# Patient Record
Sex: FEMALE | Race: WHITE | NOT HISPANIC OR LATINO | Employment: OTHER | URBAN - METROPOLITAN AREA
[De-identification: names, ages, dates, MRNs, and addresses within clinical notes are randomized per-mention and may not be internally consistent; named-entity substitution may affect disease eponyms.]

---

## 2019-12-16 ENCOUNTER — OFFICE VISIT (OUTPATIENT)
Dept: ORTHOPEDIC SURGERY | Facility: CLINIC | Age: 74
End: 2019-12-16

## 2019-12-16 VITALS — BODY MASS INDEX: 27.32 KG/M2 | HEIGHT: 66 IN | OXYGEN SATURATION: 95 % | WEIGHT: 170 LBS | HEART RATE: 67 BPM

## 2019-12-16 DIAGNOSIS — M17.12 PRIMARY OSTEOARTHRITIS OF LEFT KNEE: Primary | ICD-10-CM

## 2019-12-16 PROCEDURE — 20610 DRAIN/INJ JOINT/BURSA W/O US: CPT | Performed by: ORTHOPAEDIC SURGERY

## 2019-12-16 PROCEDURE — 99203 OFFICE O/P NEW LOW 30 MIN: CPT | Performed by: ORTHOPAEDIC SURGERY

## 2019-12-16 RX ORDER — IBUPROFEN 200 MG
200 TABLET ORAL EVERY 6 HOURS PRN
Status: ON HOLD | COMMUNITY
End: 2020-05-22 | Stop reason: SDUPTHER

## 2019-12-16 RX ORDER — FAMOTIDINE 20 MG/1
20 TABLET, FILM COATED ORAL 2 TIMES DAILY
COMMUNITY
End: 2020-03-18

## 2019-12-16 RX ORDER — ASPIRIN 81 MG/1
81 TABLET ORAL DAILY
COMMUNITY
End: 2020-02-17

## 2019-12-16 RX ORDER — TRIAMCINOLONE ACETONIDE 40 MG/ML
40 INJECTION, SUSPENSION INTRA-ARTICULAR; INTRAMUSCULAR
Status: COMPLETED | OUTPATIENT
Start: 2019-12-16 | End: 2019-12-16

## 2019-12-16 RX ORDER — ROPIVACAINE HYDROCHLORIDE 5 MG/ML
4 INJECTION, SOLUTION EPIDURAL; INFILTRATION; PERINEURAL
Status: COMPLETED | OUTPATIENT
Start: 2019-12-16 | End: 2019-12-16

## 2019-12-16 RX ORDER — CHOLECALCIFEROL (VITAMIN D3) 125 MCG
10 CAPSULE ORAL NIGHTLY
COMMUNITY
End: 2021-12-09

## 2019-12-16 RX ADMIN — TRIAMCINOLONE ACETONIDE 40 MG: 40 INJECTION, SUSPENSION INTRA-ARTICULAR; INTRAMUSCULAR at 16:31

## 2019-12-16 RX ADMIN — ROPIVACAINE HYDROCHLORIDE 4 ML: 5 INJECTION, SOLUTION EPIDURAL; INFILTRATION; PERINEURAL at 16:31

## 2019-12-16 NOTE — PROGRESS NOTES
[facility] [place] [facility] Note    Subjective     Chief Complaint   Patient presents with   • Left Knee - Pain        HPI    Asiya Cleaning is a 74 y.o. female.  She presents today for evaluation of left knee pain.  Pain is been present for several months, following no particular injury.  No previous injections.  Pain is 4 out of 10, worse with walking, climbing stairs and working.  Pain is dull, aching and burning.  Most is on the medial side of the knee.  She is using a crutch for ambulation.      There is no problem list on file for this patient.    Past Medical History:   Diagnosis Date   • Heart disease    • Osteoarthritis       Past Surgical History:   Procedure Laterality Date   • CATARACT EXTRACTION Left       Family History   Problem Relation Age of Onset   • Osteoarthritis Mother      Social History     Socioeconomic History   • Marital status:      Spouse name: Not on file   • Number of children: Not on file   • Years of education: Not on file   • Highest education level: Not on file   Tobacco Use   • Smoking status: Never Smoker   • Smokeless tobacco: Never Used   Substance and Sexual Activity   • Alcohol use: Never     Frequency: Never   • Drug use: Never      Current Outpatient Medications on File Prior to Visit   Medication Sig Dispense Refill   • aspirin 81 MG EC tablet Take 81 mg by mouth Daily.     • famotidine (PEPCID) 20 MG tablet Take 20 mg by mouth 2 (Two) Times a Day.     • ibuprofen (ADVIL,MOTRIN) 200 MG tablet Take 200 mg by mouth Every 6 (Six) Hours As Needed for Mild Pain .     • LUTEIN PO Take  by mouth.     • melatonin 5 MG tablet tablet Take 10 mg by mouth Every Night.       No current facility-administered medications on file prior to visit.       Allergies   Allergen Reactions   • Penicillins Itching        Review of Systems   Constitutional: Positive for activity change.   Cardiovascular: Positive for leg swelling.   Musculoskeletal: Positive for arthralgias, gait  "problem and joint swelling.        Objective      Physical Exam  Pulse 67   Ht 167.6 cm (66\")   Wt 77.1 kg (170 lb)   SpO2 95%   BMI 27.44 kg/m²     Body mass index is 27.44 kg/m².    General:   Mental Status:  Alert   Appearance: Cooperative, in no acute distress   Build and Nutrition: Well-nourished well-developed female   Orientation: Alert and oriented to person, place and time   Posture: Normal   Gait: Mild limp on the left    Integument:   Left knee: No skin lesions, no rash, no ecchymosis    Neurologic:   Sensation:    Left foot: Intact to light touch on the dorsal and plantar aspect   Motor:  Left lower extremity: 5/5 quadriceps, hamstrings, ankle dorsiflexors, and ankle plantar flexors  Vascular:   Left lower extremity: 2+ dorsalis pedis pulse, prompt capillary refill    Lower Extremities:   Left Knee:    Tenderness:  None    Effusion:  1-2+    Swelling:  None    Crepitus:  Positive    Atrophy:  None    Range of motion:  Extension: 0°       Flexion: 115°  Instability:  No varus laxity, no valgus laxity, negative anterior drawer  Deformities:  None      Imaging/Studies      Imaging Results (Last 24 Hours)     Procedure Component Value Units Date/Time    XR Knee 4+ View Left [[id number]] Resulted:  12/16/19 1602     Updated:  12/16/19 1603    Narrative:       Left Knee Radiographs  Indication: left knee pain  Views: Standing AP's and skiers of both knees, with lateral and sunrise   views of the left knee    Comparison: no prior studies available    Findings:   Bone-on-bone contact the medial compartment, tricompartmental osteophytes,   mild varus alignment, with bone-on-bone contact in the patellofemoral   joint, particularly along the lateral facet, with no acute bony   abnormalities.    Impression: Advanced left knee osteoarthritis.          Assessment and Plan     Asiya was seen today for pain.    Diagnoses and all orders for this visit:    Primary osteoarthritis of left knee  -     XR Knee 4+ View " Left  -     Large Joint Arthrocentesis and aspiration : L knee        1. Primary osteoarthritis of left knee        I reviewed my findings with patient today.  She does have left knee arthritis, and I offered her an aspiration and injection.  She wished to proceed.  I will see her back in 2 months, but sooner for any problems.    Of note, obtain 10 cc of clear straw-colored fluid, and then injected her knee, with 20% relief just a few minutes following the aspiration and injection.    Return in about 2 months (around 2/16/2020).      Medical Decision Making  Management Options : prescription/IM medicine  Data/Risk: radiology tests and independent visualization of imaging, lab tests, or EMG/NCV      Hadley Gibson MD  12/16/19  5:31 PM

## 2019-12-16 NOTE — PROGRESS NOTES
Procedure   Large Joint Arthrocentesis and aspiration : L knee  Date/Time: 12/16/2019 4:31 PM  Consent given by: patient  Site marked: site marked  Timeout: Immediately prior to procedure a time out was called to verify the correct patient, procedure, equipment, support staff and site/side marked as required   Supporting Documentation  Indications: pain   Procedure Details  Location: knee - L knee  Preparation: Patient was prepped and draped in the usual sterile fashion  Needle size: 22 G  Approach: anterolateral  Medications administered: 4 mL ropivacaine 0.5 %; 40 mg triamcinolone acetonide 40 MG/ML  Aspirate amount: 10 mL  Aspirate: clear and yellow  Patient tolerance: patient tolerated the procedure well with no immediate complications

## 2019-12-18 ENCOUNTER — TELEPHONE (OUTPATIENT)
Dept: ORTHOPEDIC SURGERY | Facility: CLINIC | Age: 74
End: 2019-12-18

## 2019-12-18 NOTE — TELEPHONE ENCOUNTER
PATIENT CAME IN FOR APPT ON Monday 12/16/19, MOISÉS DRAINED CYST ON L KNEE FOR HER BUT SHE IS HAVING A LOT OF PAIN ON THE SIDE OF THE KNEE CLOSE TO HER OTHER LEG. WASN'T SURE IF SHE NEEDED TO COME BACK IN. CALL BACK #[phone]

## 2019-12-27 ENCOUNTER — TELEPHONE (OUTPATIENT)
Dept: ORTHOPEDIC SURGERY | Facility: CLINIC | Age: 74
End: 2019-12-27

## 2019-12-27 NOTE — TELEPHONE ENCOUNTER
CALLED PT AND SCHD HER A TENTATIVE DOS OF 3/5/20. AND ADVISED HER TO KEEP THE 2/7/20 APPT W/DR CURIEL

## 2019-12-27 NOTE — TELEPHONE ENCOUNTER
----- Message from Jeff Patterson sent at 12/19/2019  1:55 PM EST -----  Regarding: SCHEDULE SURGERY  PATIENT CALLED AND WOULD LIKE TO PROCEED WITH SCHEDULING KNEE SURGERY  PLEASE CALL HER AT [phone]

## 2020-02-17 ENCOUNTER — OFFICE VISIT (OUTPATIENT)
Dept: ORTHOPEDIC SURGERY | Facility: CLINIC | Age: 75
End: 2020-02-17

## 2020-02-17 VITALS — WEIGHT: 165 LBS | BODY MASS INDEX: 26.52 KG/M2 | HEIGHT: 66 IN | OXYGEN SATURATION: 98 % | HEART RATE: 71 BPM

## 2020-02-17 DIAGNOSIS — M17.12 PRIMARY OSTEOARTHRITIS OF LEFT KNEE: Primary | ICD-10-CM

## 2020-02-17 PROCEDURE — 99213 OFFICE O/P EST LOW 20 MIN: CPT | Performed by: ORTHOPAEDIC SURGERY

## 2020-02-17 RX ORDER — PREGABALIN 150 MG/1
150 CAPSULE ORAL ONCE
Status: CANCELLED | OUTPATIENT
Start: 2020-02-17 | End: 2020-02-17

## 2020-02-17 RX ORDER — ACETAMINOPHEN 325 MG/1
1000 TABLET ORAL ONCE
Status: CANCELLED | OUTPATIENT
Start: 2020-02-17 | End: 2020-02-17

## 2020-02-17 RX ORDER — MELOXICAM 7.5 MG/1
15 TABLET ORAL ONCE
Status: CANCELLED | OUTPATIENT
Start: 2020-02-17 | End: 2020-02-17

## 2020-02-17 NOTE — PROGRESS NOTES
[facility] [place] [facility] Note    Subjective     Chief Complaint   Patient presents with   • Follow-up     2 month f/u; Primary osteoarthritis of left knee (aspriation and cortisone injection given last visit 12/16/19)        HPI    It has been 2  month(s) since Ms. Cleaning's last visit. She returns to clinic today for follow-up of left knee pain. She rates her pain a 3/10 on the pain scale, but can be severe and has been activity limiting, and she is reached the point where she would like to consider knee replacement surgery.. Previous/current treatments: bracing, cane/walker, NSAIDS and steroid injection (last injection 12/19/19). Current symptoms: same as prior visit. The pain is worse with walking, standing, sitting, climbing stairs, sleeping, working and leisure; resting and ice improve the pain. Overall, she is doing the same.  Pain has been ongoing since last July 2019, and she would like to proceed with knee replacement surgery.  No history of clots nor clotting disorders.    I have reviewed the following portions of the patient's history:History of Present Illness        Patient Active Problem List   Diagnosis   • Primary osteoarthritis of left knee     Past Medical History:   Diagnosis Date   • Heart disease    • Osteoarthritis       Past Surgical History:   Procedure Laterality Date   • CATARACT EXTRACTION Left       Family History   Problem Relation Age of Onset   • Osteoarthritis Mother      Social History     Socioeconomic History   • Marital status:      Spouse name: Not on file   • Number of children: Not on file   • Years of education: Not on file   • Highest education level: Not on file   Tobacco Use   • Smoking status: Never Smoker   • Smokeless tobacco: Never Used   Substance and Sexual Activity   • Alcohol use: Never     Frequency: Never   • Drug use: Never      Current Outpatient Medications on File Prior to Visit   Medication Sig Dispense Refill   • famotidine (PEPCID) 20 MG  tablet Take 20 mg by mouth 2 (Two) Times a Day.     • ibuprofen (ADVIL,MOTRIN) 200 MG tablet Take 200 mg by mouth Every 6 (Six) Hours As Needed for Mild Pain .     • LUTEIN PO Take  by mouth.     • melatonin 5 MG tablet tablet Take 10 mg by mouth Every Night.     • [DISCONTINUED] aspirin 81 MG EC tablet Take 81 mg by mouth Daily.       No current facility-administered medications on file prior to visit.       Allergies   Allergen Reactions   • Penicillins Itching        Review of Systems   Constitutional: Negative for activity change, appetite change, chills, diaphoresis, fatigue, fever and unexpected weight change.   HENT: Negative for congestion, dental problem, drooling, ear discharge, ear pain, facial swelling, hearing loss, mouth sores, nosebleeds, postnasal drip, rhinorrhea, sinus pressure, sneezing, sore throat, tinnitus, trouble swallowing and voice change.    Eyes: Negative for photophobia, pain, discharge, redness, itching and visual disturbance.   Respiratory: Negative for apnea, cough, choking, chest tightness, shortness of breath, wheezing and stridor.    Cardiovascular: Negative for chest pain, palpitations and leg swelling.   Gastrointestinal: Negative for abdominal distention, abdominal pain, anal bleeding, blood in stool, constipation, diarrhea, nausea, rectal pain and vomiting.   Endocrine: Negative for cold intolerance, heat intolerance, polydipsia, polyphagia and polyuria.   Genitourinary: Negative for decreased urine volume, difficulty urinating, dysuria, enuresis, flank pain, frequency, genital sores, hematuria and urgency.   Musculoskeletal: Positive for arthralgias. Negative for back pain, gait problem, joint swelling, myalgias, neck pain and neck stiffness.   Skin: Negative for color change, pallor, rash and wound.   Allergic/Immunologic: Negative for environmental allergies, food allergies and immunocompromised state.   Neurological: Negative for dizziness, tremors, seizures, syncope,  "facial asymmetry, speech difficulty, weakness, light-headedness, numbness and headaches.   Hematological: Negative for adenopathy. Does not bruise/bleed easily.   Psychiatric/Behavioral: Negative for agitation, behavioral problems, confusion, decreased concentration, dysphoric mood, hallucinations, self-injury, sleep disturbance and suicidal ideas. The patient is not nervous/anxious and is not hyperactive.         Objective      Physical Exam  Pulse 71   Ht 167.6 cm (65.98\")   Wt 74.8 kg (165 lb)   SpO2 98%   BMI 26.64 kg/m²     Body mass index is 26.64 kg/m².    General:   Mental Status:  Alert   Appearance: Cooperative, in no acute distress   Build and Nutrition: Well-nourished well-developed female   Orientation: Alert and oriented to person, place and time   Posture: Normal   Gait: Mild limp on the left    Integument:              Left knee: No skin lesions, no rash, no ecchymosis     Lower Extremities:              Left Knee:                          Tenderness:    None                          Effusion:          1+                          Swelling:          None                          Crepitus:          Positive                          Atrophy:           None                          Range of motion:        Extension:       0°                                                              Flexion:           115°  Instability:        No varus laxity, no valgus laxity, negative anterior drawer  Deformities:     None      Assessment and Plan     Asiya was seen today for follow-up.    Diagnoses and all orders for this visit:    Primary osteoarthritis of left knee  -     Case Request; Standing  -     Instructions on coughing, deep breathing, and incentive spirometry.; Future  -     CBC and Differential; Future  -     Basic metabolic panel; Future  -     Protime-INR; Future  -     APTT; Future  -     Sedimentation rate; Future  -     C-reactive protein; Future  -     Case Request    Other orders  -     " Follow Anesthesia Guidelines / Standing Orders; Future  -     Obtain informed consent  -     Provide instructions to patient regarding NPO status  -     Chlorhexidine Skin Prep - Educate and Review With Patient; Future  -     Provide Patient With ERAS Hydration Instructions  -     Provide Patient With Enhanced Recovery Booklet(s) or Handout  -     Provide Instructions/Handout For Benzoyl Peroxide 5% Wash If Having Shoulder/Arm Surgery (If Prescribed)  -     Provide Instructions/Handout For Bactroban And Chlorhexidine Shower (If Prescribed)  -     Perform A Memory Screening On All Hip/Knee Replacement Patients >Or Equal To 65 Years Or Older  -     Complete A PROMIS And HOOS Or KOOS Survey If Having Hip Or Knee Replacement  -     Provide Patient With Carbo Loading Instructions  -     Provide Patient With ERAS Booklet(s)/Handout  -     mupirocin (BACTROBAN) 2 % ointment; into the nostril(s) as directed by provider 2 (Two) Times a Day.  -     Chlorhexidine Gluconate 4 % solution; Apply  topically to the appropriate area as directed Daily. Shower with hibiclens solution as directed for 5 days prior to surgery        1. Primary osteoarthritis of left knee        I reviewed my findings with the patient today.  Her left knee pain continues to progress, and she would like to proceed with knee replacement surgery.  She is considering May timeframe of this year.  Risk, benefits, and alternatives to the procedure have been discussed.  Please see my counseling note for details.  Of note, she did not tolerate hydrocodone very well in the past, and tolerates oxycodone better.    Surgical Counseling     I have informed the patient of the diagnosis and the prognosis.  Exhaustive conservative treatment modalities have not resulted in long term pain relief.  The symptoms have progressed to the point of daily pain and inability to perform activities of daily living without significant pain. The patient has reached the point of desiring  to proceed with total knee arthroplasty after discussing the risks, benefits and alternatives to the procedure.  The surgical procedure itself was discussed in detail. Risks of the procedure were discussed, which included but are not limited to, bleeding, infection, damage to blood vessels and nerves, incomplete pain relief, loosening of the prosthesis, deep infection, need for further surgery, loss of limb, deep venous thrombosis, pulmonary embolus, death, heart attack, stroke, kidney failure, liver failure, and anesthetic complications.  In addition, the potential for deep infection developing in the future was discussed, which could require further surgery.  The knee would have to be re-opened, debrided, and potentially remove the prosthesis, which may or may not be replaced in the future.  Also, the possibility for loosening of the prosthesis has been mentioned.  If the prosthesis loosened, a revision arthroplasty could be performed, with results that are not as predictable compared to the original procedure.  The typical rehabilitative course has also been discussed, and full recovery may take up to a year to see the maximum benefit.  The importance of patient cooperation in the rehabilitative efforts has also been discussed.  No guarantees whatsoever were given.  The patient understands the potential risks versus the benefits and desires to proceed with total knee arthroplasty at a mutually convenient time.    Return for For surgery as planned.      Medical Decision Making  Management Options : major surgery with risk factors      Hadley Gibson MD  02/17/20  6:11 PM

## 2020-03-18 ENCOUNTER — OFFICE VISIT (OUTPATIENT)
Dept: PULMONOLOGY | Facility: CLINIC | Age: 75
End: 2020-03-18

## 2020-03-18 VITALS
WEIGHT: 170.4 LBS | HEIGHT: 66 IN | HEART RATE: 84 BPM | OXYGEN SATURATION: 98 % | SYSTOLIC BLOOD PRESSURE: 124 MMHG | DIASTOLIC BLOOD PRESSURE: 70 MMHG | BODY MASS INDEX: 27.38 KG/M2 | TEMPERATURE: 97.8 F

## 2020-03-18 DIAGNOSIS — R05.9 COUGH: Primary | ICD-10-CM

## 2020-03-18 DIAGNOSIS — J30.1 SEASONAL ALLERGIC RHINITIS DUE TO POLLEN: ICD-10-CM

## 2020-03-18 PROCEDURE — 99203 OFFICE O/P NEW LOW 30 MIN: CPT | Performed by: INTERNAL MEDICINE

## 2020-03-18 NOTE — PROGRESS NOTES
Asiya Cleaning is a 74 y.o. female here for evaluation of   Chief Complaint   Patient presents with   • Shortness of Breath   • Breathing Problem   • Cough   • Wheezing       Problem list:  1. Persistent cough  2. Seasonal allergies  3. Influenza a, January 18, 2020  4. Osteoarthritis left knee  5. Baker's cyst left knee  6. Left cataract extraction, 2010  7. Repair of macular hole, 2009  8. Colonoscopy, 2008, normal  9. Dental extraction  10. Childbirth x2  11. Allergies, doxycycline-rash, penicillin-itching  12. No tobacco, no alcohol      History of Present Illness    74-year-old woman, non-smoker, doing well until January 16 when she developed influenza A.  She did not take Tamiflu.  She transiently improved but on January 27 she presented with persistent cough that changed to purulent secretions.  She did not have fever.  She was given azithromycin and felt unimproved.  She continued to remain afebrile but had a relentless cough.  Cough is now productive of mucoid secretions, mostly clear.  She was evaluated at the [facility] February 10.  Chest x-ray was reportedly abnormal with bibasilar atelectasis.  She received 5 days of Levaquin without benefit.  Cough would come in paroxysms.  She would then cough up a scant mucous plug and feel improved.  Denies pleurisy or hemoptysis.  Denies reflux or dysphasia.  She has a lot of allergies, and was receiving immunotherapy.  That has been on hold with this acute infection.  She then had an ear infection.  She did receive doxycycline, stay hist, Z test.  This did dry her sinuses up.  However she had a rash to doxycycline after the second dose and it was discontinued.  She does not recall the last Tetanus diphtheria pertussis vaccine.  She denies a history of asthma, wheezing, dyspnea.  She denies exposure to mold or noxious fumes.  She started using a vaporizer at night and that has helped her nasal dryness.  She has never been exposed to TB and has never had a  positive skin test.  She has not traveled outside of [place].  She denies nasal polyps or aspirin sensitivity.  Approximately 4 days ago she started feeling better.  The cough is not gone but it is less frequent.  She did have a follow-up chest x-ray March 11 that revealed mild persistent bibasilar airspace disease possibly atelectasis versus pneumonia.      Review of Systems   Constitutional: Positive for fatigue. Negative for chills, fever and unexpected weight change.   HENT: Positive for congestion, postnasal drip and sinus pressure. Negative for sore throat and trouble swallowing.    Eyes: Negative for visual disturbance.   Respiratory: Positive for cough. Negative for apnea, choking, chest tightness, shortness of breath and wheezing.    Cardiovascular: Negative for chest pain, palpitations and leg swelling.   Gastrointestinal: Negative for abdominal pain, blood in stool and diarrhea.   Endocrine: Negative.    Genitourinary: Negative for dysuria, frequency and vaginal bleeding.   Musculoskeletal: Positive for arthralgias. Negative for back pain and neck pain.   Skin: Negative for rash.   Allergic/Immunologic: Positive for environmental allergies.   Neurological: Negative for tremors, weakness, light-headedness and headaches.   Hematological: Negative for adenopathy. Does not bruise/bleed easily.   Psychiatric/Behavioral: Negative.          Current Outpatient Medications:   •  Chlorhexidine Gluconate 4 % solution, Shower with solution as directed for 5 days prior to surgery, Disp: 237 mL, Rfl: 0  •  Chlorpheniramine-Codeine (Z-Tuss AC) 2-9 MG/5ML liquid, Take  by mouth., Disp: , Rfl:   •  Dexchlorpheniramine-Phenyleph (STAHIST PO), Take  by mouth., Disp: , Rfl:   •  ibuprofen (ADVIL,MOTRIN) 200 MG tablet, Take 200 mg by mouth Every 6 (Six) Hours As Needed for Mild Pain ., Disp: , Rfl:   •  LUTEIN PO, Take  by mouth., Disp: , Rfl:   •  melatonin 5 MG tablet tablet, Take 10 mg by mouth Every Night., Disp: , Rfl:  "  •  mupirocin (BACTROBAN) 2 % ointment, Apply into the nostril(s) as directed by provider 2 (Two) Times a Day., Disp: 22 g, Rfl: 0    Past Medical History:   Diagnosis Date   • Allergic rhinitis    • Baker's cyst of knee, left    • Osteoarthritis      Past Surgical History:   Procedure Laterality Date   • CATARACT EXTRACTION Left 2010   • COLONOSCOPY  2008    clear   • DENTAL PROCEDURE      extraction   • VITRECTOMY PARS PLANA W/ REPAIR OF MACULAR HOLE  2009     Social History     Socioeconomic History   • Marital status:      Spouse name: Not on file   • Number of children: 2   • Years of education: Not on file   • Highest education level: Not on file   Occupational History   • Occupation: take out resturant owner   Tobacco Use   • Smoking status: Never Smoker   • Smokeless tobacco: Never Used   Substance and Sexual Activity   • Alcohol use: Never     Frequency: Never   • Drug use: Never     Family History   Problem Relation Age of Onset   • Osteoarthritis Mother    • Pneumonia Mother    • Dementia Father      Blood pressure 124/70, pulse 84, temperature 97.8 °F (36.6 °C), height 167.6 cm (66\"), weight 77.3 kg (170 lb 6.4 oz), SpO2 98 %, not currently breastfeeding.    Physical Exam   Constitutional: She is oriented to person, place, and time. She appears well-developed and well-nourished. No distress.   HENT:   Head: Normocephalic and atraumatic.   Clear PND. No exudate   Eyes: Pupils are equal, round, and reactive to light. EOM are normal. No scleral icterus.   Neck: No JVD present. No tracheal deviation present. No thyromegaly present.   Cardiovascular: Normal rate, regular rhythm and normal heart sounds.   No murmur heard.  Pulmonary/Chest: Effort normal and breath sounds normal. She has no wheezes. She has no rales.   Abdominal: Soft. Bowel sounds are normal. She exhibits no distension. There is no tenderness.   Musculoskeletal: She exhibits no edema.   Lymphadenopathy:     She has no cervical " adenopathy.   Neurological: She is alert and oriented to person, place, and time.   Skin: Skin is warm and dry.   Psychiatric: She has a normal mood and affect.         PFTs:  Unable to perform secondary to CoVID 19    Radiology:  I reviewed her chest x-rays.  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  She has some calcified granulomatous changes and some postinflammatory scarring in the lower lobes      Lab:  White count 5.9, hemoglobin 15.6, hematocrit 43.1, platelet count 195, 44% polys, 37% lymphocytes, 12% monocytes, 5% eosinophils    Asiya was seen today for shortness of breath, breathing problem, cough and wheezing.    Diagnoses and all orders for this visit:    Cough    Seasonal allergic rhinitis due to pollen        Discussion:     Delightful 74-year-old woman, non-smoker here for persistent cough after an influenza A infection.  She has remained afebrile despite this cough.  That makes a pneumococcal or staph post influenza pneumonia less likely.  Radiographically she has some postinflammatory scarring in the lower lobes but I would not say she has a lobar process or infiltrate.  She does have calcified granulomas with some fibronodular changes as well.  I do not think that she is contagious.  She now has significant postnasal drip despite stay hist.  I do not see any nasal polyps.    Restart immunotherapy  Start Flonase twice daily  Continue an antihistamine either stay hist or Zyrtec but not both together  Nasal irrigation at night  Follow-up as needed      Steff Matthews MD

## 2020-03-31 ENCOUNTER — TELEPHONE (OUTPATIENT)
Dept: PULMONOLOGY | Facility: CLINIC | Age: 75
End: 2020-03-31

## 2020-03-31 NOTE — PROGRESS NOTES
Ms. Cleaning called stating that she was having worsening congestion, postnasal drainage and coughing.  She states that she saw Dr. Matthews 2 weeks ago and is tried Zyrtec since that time.  She continues to have postnasal drainage regardless of the medication she is taking.  She had stopped her Flonase and I did recommend her starting her Flonase back.  She also had her allergy shot 2 weeks ago but has not got her injection this week.  I did advise her to call her allergist and see if maybe she could get into see her allergist for any recommendations for her drainage.  She was agreeable with this plan and will call if she needs any additional assistance.

## 2020-03-31 NOTE — TELEPHONE ENCOUNTER
Pt called today requesting to schedule an apt due to no improvement since seen in the office on 3/18. Pt c/o persistent cough/congestion/sinus pressure and feels that the pneumonia has not cleared. Pt denies fever/chest pain/nausea. Pt can be reached @ [phone]. Please advise.

## 2020-04-20 ENCOUNTER — TELEPHONE (OUTPATIENT)
Dept: ORTHOPEDIC SURGERY | Facility: CLINIC | Age: 75
End: 2020-04-20

## 2020-04-20 NOTE — TELEPHONE ENCOUNTER
PATIENT CALLED STATED SHE WAS SUPPOSED TO HAVE SURGERY ON  RIGHT KNEE. PATIENT STATED THAT NOW HER LEFT KNEE IS HURTING JUST AS BAD AND PATIENT STATED POSSIBLY A BAKER CYST. PATIENT STATED SWELLING TO RIGHT KNEE.PATIENT STATED HAD KNEE  DRAINED IN DECEMBER BUT ITS STARTING TO STIFFEN UP. PATIENT WOULD LIKE A CALL BACK [phone].

## 2020-04-20 NOTE — TELEPHONE ENCOUNTER
The patient mentioned that the right knee has really gone downhill. She has had a lot of stiffness and swelling in the knee. She thinks it has to do with a bakers cyst and it is getting difficult to get up and down stairs. I asked if she has been doing any ice, heat, Tylenol or Ibuprofen. She mentioned that she has only been using ice and a CBD cream on the knee. I asked if she was able to do any oral anti-inflammatories and she mentioned that she has but she does not see any pain relief. She has been Tylenol and that seems to help some. I advised that she should keep using either ice or heat to see which one works best for her. I also let her know that she can elevate the knee when she is having the swelling and use some type of compression on the knee to give it some relief. She understood.    Is there anything else you may recommend at this time?     Kassidy

## 2020-04-21 NOTE — TELEPHONE ENCOUNTER
I spoke with the patient and advised that she should continue to do everything we discussed yesterday for the time being and if she is to have any other concerns to return our call. She understood.    Kassidy

## 2020-04-21 NOTE — TELEPHONE ENCOUNTER
That is good advice.  I do not have anything else to recommend currently.  We will be rescheduling her surgery when we are able to start doing outpatient surgery (if she is a candidate), but we are not sure at this time on when that will be.  We are awaiting further guidance from federal and state government.

## 2020-04-23 ENCOUNTER — APPOINTMENT (OUTPATIENT)
Dept: PREADMISSION TESTING | Facility: HOSPITAL | Age: 75
End: 2020-04-23

## 2020-04-28 ENCOUNTER — TELEPHONE (OUTPATIENT)
Dept: ORTHOPEDIC SURGERY | Facility: CLINIC | Age: 75
End: 2020-04-28

## 2020-05-05 ENCOUNTER — TELEPHONE (OUTPATIENT)
Dept: PULMONOLOGY | Facility: CLINIC | Age: 75
End: 2020-05-05

## 2020-05-05 NOTE — TELEPHONE ENCOUNTER
Pt called today c/o cough w/ sputum/congestion since last office visit with Dr Matthews and doesn't seem to be getting any better. Pt denies fever/chest pain/nausea.  Pt uses [facility] and can be reached @ [phone]. Please advise.

## 2020-05-07 NOTE — TELEPHONE ENCOUNTER
Pt scheduled with Dr Matthews for 5/11 @ 10:00. Pt will also be needing a pre-op clearance for left knee surgery on 5/21. Testing will be determined with Dr Matthews. Pt notified through  of apt time and date. Office # given.

## 2020-05-11 ENCOUNTER — OFFICE VISIT (OUTPATIENT)
Dept: PULMONOLOGY | Facility: CLINIC | Age: 75
End: 2020-05-11

## 2020-05-11 VITALS
HEART RATE: 80 BPM | OXYGEN SATURATION: 96 % | SYSTOLIC BLOOD PRESSURE: 110 MMHG | TEMPERATURE: 97.1 F | WEIGHT: 176.8 LBS | HEIGHT: 66 IN | DIASTOLIC BLOOD PRESSURE: 70 MMHG | BODY MASS INDEX: 28.42 KG/M2

## 2020-05-11 DIAGNOSIS — R05.9 COUGH: ICD-10-CM

## 2020-05-11 DIAGNOSIS — J30.1 SEASONAL ALLERGIC RHINITIS DUE TO POLLEN: Primary | ICD-10-CM

## 2020-05-11 PROCEDURE — 94729 DIFFUSING CAPACITY: CPT | Performed by: INTERNAL MEDICINE

## 2020-05-11 PROCEDURE — 99213 OFFICE O/P EST LOW 20 MIN: CPT | Performed by: INTERNAL MEDICINE

## 2020-05-11 PROCEDURE — 94726 PLETHYSMOGRAPHY LUNG VOLUMES: CPT | Performed by: INTERNAL MEDICINE

## 2020-05-11 PROCEDURE — 94060 EVALUATION OF WHEEZING: CPT | Performed by: INTERNAL MEDICINE

## 2020-05-11 RX ORDER — MONTELUKAST SODIUM 10 MG/1
10 TABLET ORAL NIGHTLY
Qty: 30 TABLET | Refills: 11 | Status: SHIPPED | OUTPATIENT
Start: 2020-05-11 | End: 2021-05-24

## 2020-05-11 RX ORDER — ALBUTEROL SULFATE 90 UG/1
4 AEROSOL, METERED RESPIRATORY (INHALATION) ONCE
Status: COMPLETED | OUTPATIENT
Start: 2020-05-11 | End: 2020-05-11

## 2020-05-11 RX ADMIN — ALBUTEROL SULFATE 4 PUFF: 90 AEROSOL, METERED RESPIRATORY (INHALATION) at 11:11

## 2020-05-11 NOTE — PROGRESS NOTES
Asiya Cleaning is a 74 y.o. female here for evaluation of   Chief Complaint   Patient presents with   • Cough       Problem list:  1. Persistent cough  2. Seasonal allergies  3. Influenza a, January 18, 2020  4. Osteoarthritis left knee  5. Baker's cyst left knee  6. Left cataract extraction, 2010  7. Repair of macular hole, 2009  8. Colonoscopy, 2008, normal  9. Dental extraction  10. Childbirth x2  11. Allergies, doxycycline-rash, penicillin-itching  12. No tobacco, no alcohol    History of Present Illness     74-year-old woman, non-smoker initially evaluated by me for persistent cough after an influenza infection.  She had no wheezing on examination.  I cannot perform PFTs because of coronavirus.  She had significant postnasal drip.  She is taking Zyrtec in the morning, Benadryl at night, using her nasal steroids and continues to have copious drainage.  She did restart her immunotherapy and has had weekly shots for the last 4 weeks.  At least she is not coughing at night but has persistent cough throughout the day.  She denies wheezing and has never had a history of asthma.  She denies noxious fume exposure.  She denies reflux symptoms.      Review of Systems   Constitutional: Negative.    HENT: Positive for congestion and postnasal drip.    Respiratory: Positive for cough. Negative for wheezing.    Cardiovascular: Negative for chest pain.         Current Outpatient Medications:   •  Chlorhexidine Gluconate 4 % solution, Shower with solution as directed for 5 days prior to surgery, Disp: 237 mL, Rfl: 0  •  Chlorpheniramine-Codeine (Z-Tuss AC) 2-9 MG/5ML liquid, Take  by mouth., Disp: , Rfl:   •  Dexchlorpheniramine-Phenyleph (STAHIST PO), Take  by mouth., Disp: , Rfl:   •  ibuprofen (ADVIL,MOTRIN) 200 MG tablet, Take 200 mg by mouth Every 6 (Six) Hours As Needed for Mild Pain ., Disp: , Rfl:   •  LUTEIN PO, Take  by mouth., Disp: , Rfl:   •  melatonin 5 MG tablet tablet, Take 10 mg by mouth Every Night., Disp:  ", Rfl:   •  mupirocin (BACTROBAN) 2 % ointment, Apply into the nostril(s) as directed by provider 2 (Two) Times a Day., Disp: 22 g, Rfl: 0  •  montelukast (SINGULAIR) 10 MG tablet, Take 1 tablet by mouth Every Night., Disp: 30 tablet, Rfl: 11    Past Medical History:   Diagnosis Date   • Allergic rhinitis    • Baker's cyst of knee, left    • Osteoarthritis      Past Surgical History:   Procedure Laterality Date   • CATARACT EXTRACTION Left 2010   • COLONOSCOPY  2008    clear   • DENTAL PROCEDURE      extraction   • VITRECTOMY PARS PLANA W/ REPAIR OF MACULAR HOLE  2009     Social History     Socioeconomic History   • Marital status:      Spouse name: Not on file   • Number of children: 2   • Years of education: Not on file   • Highest education level: Not on file   Occupational History   • Occupation: take out resturant owner   Tobacco Use   • Smoking status: Never Smoker   • Smokeless tobacco: Never Used   Substance and Sexual Activity   • Alcohol use: Never     Frequency: Never   • Drug use: Never     Family History   Problem Relation Age of Onset   • Osteoarthritis Mother    • Pneumonia Mother    • Dementia Father      Blood pressure 110/70, pulse 80, temperature 97.1 °F (36.2 °C), height 167.6 cm (66\"), weight 80.2 kg (176 lb 12.8 oz), SpO2 96 %,    Physical Exam   Constitutional: She is oriented to person, place, and time. She appears well-developed and well-nourished. No distress.   HENT:   Head: Normocephalic and atraumatic.   Nasal erythema, no polyps   Eyes: Pupils are equal, round, and reactive to light. EOM are normal. No scleral icterus.   Neck: No JVD present. No tracheal deviation present. No thyromegaly present.   No bruits   Cardiovascular: Normal rate, regular rhythm and normal heart sounds.   No murmur heard.  Pulmonary/Chest: Effort normal and breath sounds normal. She has no wheezes. She has no rales.   Abdominal: Soft. Bowel sounds are normal. She exhibits no distension. There is no " tenderness.   Musculoskeletal: She exhibits no edema.   Lymphadenopathy:     She has no cervical adenopathy.   Neurological: She is alert and oriented to person, place, and time.   Skin: Skin is dry.   Psychiatric: She has a normal mood and affect.         PFTs:    Radiology:  CXR from 3/11/2020 scoliosis. NAD, Azogous lobe  Lab:    Asiya was seen today for cough.    Diagnoses and all orders for this visit:    Seasonal allergic rhinitis due to pollen    Cough    Other orders  -     montelukast (SINGULAIR) 10 MG tablet; Take 1 tablet by mouth Every Night.        Discussion:     74-year-old woman with a persistent cough and evidence of postnasal drip.  Cough is persisted despite fairly aggressive treatment of her allergies with immunotherapy, antihistamines, nasal steroids.  She has never tried Singulair.  She cannot take decongestants.  She does not have wheezing on examination.  Despite her cough she has a normal resting oxygen and no sign of pneumonia or wheezing on examination.  She has no history of cardiac disease and no risk factors.  Blood pressure is normal at 110/70.  She should tolerate general anesthesia for a knee replacement next week.    Spirometry with and without bronchodilators  Add Singulair 10 mg at bedtime      Steff Matthews MD    Addendum: Spirometry reveals FVC 3.70 L, 116%, FEV1 2.93 L, 122%, ratio 79%, total lung capacity 5.32 L, 102%, diffusion capacity 20.5, 94%, normal study.  She is clear for general anesthesia and total knee replacement.

## 2020-05-17 ENCOUNTER — APPOINTMENT (OUTPATIENT)
Dept: PREADMISSION TESTING | Facility: HOSPITAL | Age: 75
End: 2020-05-17

## 2020-05-18 ENCOUNTER — APPOINTMENT (OUTPATIENT)
Dept: PREADMISSION TESTING | Facility: HOSPITAL | Age: 75
End: 2020-05-18

## 2020-05-18 ENCOUNTER — OFFICE VISIT (OUTPATIENT)
Dept: PREADMISSION TESTING | Facility: HOSPITAL | Age: 75
End: 2020-05-18

## 2020-05-18 VITALS — HEIGHT: 66 IN | BODY MASS INDEX: 28.34 KG/M2 | WEIGHT: 176.37 LBS | TEMPERATURE: 97.6 F

## 2020-05-18 DIAGNOSIS — M17.12 PRIMARY OSTEOARTHRITIS OF LEFT KNEE: ICD-10-CM

## 2020-05-18 LAB
ANION GAP SERPL CALCULATED.3IONS-SCNC: 10 MMOL/L (ref 5–15)
APTT PPP: 34.1 SECONDS (ref 24–37)
BASOPHILS # BLD AUTO: 0.01 10*3/MM3 (ref 0–0.2)
BASOPHILS NFR BLD AUTO: 0.2 % (ref 0–1.5)
BUN BLD-MCNC: 8 MG/DL (ref 8–23)
BUN/CREAT SERPL: 10.7 (ref 7–25)
CALCIUM SPEC-SCNC: 9.4 MG/DL (ref 8.6–10.5)
CHLORIDE SERPL-SCNC: 106 MMOL/L (ref 98–107)
CO2 SERPL-SCNC: 28 MMOL/L (ref 22–29)
CREAT BLD-MCNC: 0.75 MG/DL (ref 0.57–1)
CRP SERPL-MCNC: 0.15 MG/DL (ref 0–0.5)
DEPRECATED RDW RBC AUTO: 43.8 FL (ref 37–54)
EOSINOPHIL # BLD AUTO: 0.08 10*3/MM3 (ref 0–0.4)
EOSINOPHIL NFR BLD AUTO: 1.9 % (ref 0.3–6.2)
ERYTHROCYTE [DISTWIDTH] IN BLOOD BY AUTOMATED COUNT: 12.6 % (ref 12.3–15.4)
ERYTHROCYTE [SEDIMENTATION RATE] IN BLOOD: 6 MM/HR (ref 0–30)
GFR SERPL CREATININE-BSD FRML MDRD: 76 ML/MIN/1.73
GLUCOSE BLD-MCNC: 120 MG/DL (ref 65–99)
HCT VFR BLD AUTO: 41.2 % (ref 34–46.6)
HGB BLD-MCNC: 13.6 G/DL (ref 12–15.9)
IMM GRANULOCYTES # BLD AUTO: 0.02 10*3/MM3 (ref 0–0.05)
IMM GRANULOCYTES NFR BLD AUTO: 0.5 % (ref 0–0.5)
INR PPP: 1.05 (ref 0.85–1.16)
LYMPHOCYTES # BLD AUTO: 1.36 10*3/MM3 (ref 0.7–3.1)
LYMPHOCYTES NFR BLD AUTO: 31.5 % (ref 19.6–45.3)
MCH RBC QN AUTO: 31.2 PG (ref 26.6–33)
MCHC RBC AUTO-ENTMCNC: 33 G/DL (ref 31.5–35.7)
MCV RBC AUTO: 94.5 FL (ref 79–97)
MONOCYTES # BLD AUTO: 0.61 10*3/MM3 (ref 0.1–0.9)
MONOCYTES NFR BLD AUTO: 14.1 % (ref 5–12)
NEUTROPHILS # BLD AUTO: 2.24 10*3/MM3 (ref 1.7–7)
NEUTROPHILS NFR BLD AUTO: 51.8 % (ref 42.7–76)
NRBC BLD AUTO-RTO: 0 /100 WBC (ref 0–0.2)
PLATELET # BLD AUTO: 194 10*3/MM3 (ref 140–450)
PMV BLD AUTO: 10.4 FL (ref 6–12)
POTASSIUM BLD-SCNC: 3.8 MMOL/L (ref 3.5–5.2)
PROTHROMBIN TIME: 13.4 SECONDS (ref 11.5–14)
RBC # BLD AUTO: 4.36 10*6/MM3 (ref 3.77–5.28)
SODIUM BLD-SCNC: 144 MMOL/L (ref 136–145)
WBC NRBC COR # BLD: 4.32 10*3/MM3 (ref 3.4–10.8)

## 2020-05-18 PROCEDURE — U0004 COV-19 TEST NON-CDC HGH THRU: HCPCS | Performed by: INTERNAL MEDICINE

## 2020-05-18 PROCEDURE — 80048 BASIC METABOLIC PNL TOTAL CA: CPT | Performed by: ORTHOPAEDIC SURGERY

## 2020-05-18 PROCEDURE — 85730 THROMBOPLASTIN TIME PARTIAL: CPT | Performed by: ORTHOPAEDIC SURGERY

## 2020-05-18 PROCEDURE — 86140 C-REACTIVE PROTEIN: CPT | Performed by: ORTHOPAEDIC SURGERY

## 2020-05-18 PROCEDURE — C9803 HOPD COVID-19 SPEC COLLECT: HCPCS

## 2020-05-18 PROCEDURE — 36415 COLL VENOUS BLD VENIPUNCTURE: CPT

## 2020-05-18 PROCEDURE — 85652 RBC SED RATE AUTOMATED: CPT | Performed by: ORTHOPAEDIC SURGERY

## 2020-05-18 PROCEDURE — U0002 COVID-19 LAB TEST NON-CDC: HCPCS | Performed by: INTERNAL MEDICINE

## 2020-05-18 PROCEDURE — 85610 PROTHROMBIN TIME: CPT | Performed by: ORTHOPAEDIC SURGERY

## 2020-05-18 PROCEDURE — 85025 COMPLETE CBC W/AUTO DIFF WBC: CPT | Performed by: ORTHOPAEDIC SURGERY

## 2020-05-18 RX ORDER — ASCORBIC ACID 500 MG
1000 TABLET ORAL DAILY
COMMUNITY

## 2020-05-18 RX ORDER — ACETAMINOPHEN 325 MG/1
650 TABLET ORAL EVERY 6 HOURS PRN
COMMUNITY

## 2020-05-18 RX ORDER — CETIRIZINE HYDROCHLORIDE 10 MG/1
10 TABLET ORAL DAILY
COMMUNITY
End: 2021-05-24

## 2020-05-18 RX ORDER — DIPHENHYDRAMINE HCL 25 MG
25 CAPSULE ORAL NIGHTLY PRN
COMMUNITY
End: 2021-05-24

## 2020-05-18 RX ORDER — FLUTICASONE PROPIONATE 50 MCG
2 SPRAY, SUSPENSION (ML) NASAL DAILY
COMMUNITY

## 2020-05-18 ASSESSMENT — KOOS JR
KOOS JR SCORE: 14
KOOS JR SCORE: 52.465

## 2020-05-18 NOTE — PAT
MEMORY SCREEN PASSED    Patient to apply Chlorhexadine wipes  to surgical area (as instructed) the night before procedure and the AM of procedure. Wipes provided.    Patient instructed to drink 20 ounces (or until full) of Gatorade and it needs to be completed 1 hour before given arrival time for procedure (NO RED Gatorade)    Patient verbalized understanding.    COVID TEST PERFORMED PRIOR TO PAT APPT.    JOINT BINDER GIVEN TO PATIENT     Verified with patient that they received a prescription for Bactroban and Chlorhexidine shower from the office.  Reinforced with them to fill the prescription if they haven't already.  Verbal and written instructions given regarding proper use of the Bactroban and Chlorhexidine to patient and/or famlily during PAT visit. Patient/family also instructed to complete checklist and return it to Pre-op on the day of surgery.  Patient and/or family verbalized understanding.

## 2020-05-18 NOTE — DISCHARGE INSTRUCTIONS

## 2020-05-19 LAB
REF LAB TEST METHOD: NORMAL
SARS-COV-2 RNA RESP QL NAA+PROBE: NOT DETECTED

## 2020-05-21 ENCOUNTER — ANESTHESIA EVENT (OUTPATIENT)
Dept: PERIOP | Facility: HOSPITAL | Age: 75
End: 2020-05-21

## 2020-05-21 ENCOUNTER — HOSPITAL ENCOUNTER (OUTPATIENT)
Facility: HOSPITAL | Age: 75
Discharge: HOME | End: 2020-05-22
Attending: ORTHOPAEDIC SURGERY | Admitting: ORTHOPAEDIC SURGERY

## 2020-05-21 ENCOUNTER — APPOINTMENT (OUTPATIENT)
Dept: GENERAL RADIOLOGY | Facility: HOSPITAL | Age: 75
End: 2020-05-21

## 2020-05-21 ENCOUNTER — ANESTHESIA (OUTPATIENT)
Dept: PERIOP | Facility: HOSPITAL | Age: 75
End: 2020-05-21

## 2020-05-21 DIAGNOSIS — M17.12 PRIMARY OSTEOARTHRITIS OF LEFT KNEE: ICD-10-CM

## 2020-05-21 DIAGNOSIS — Z96.652 S/P TOTAL KNEE ARTHROPLASTY, LEFT: Primary | ICD-10-CM

## 2020-05-21 PROBLEM — F32.A DEPRESSION: Status: ACTIVE | Noted: 2020-05-21

## 2020-05-21 PROCEDURE — 63710000001 OXYCODONE 5 MG TABLET: Performed by: ORTHOPAEDIC SURGERY

## 2020-05-21 PROCEDURE — 25010000002 ONDANSETRON PER 1 MG: Performed by: NURSE ANESTHETIST, CERTIFIED REGISTERED

## 2020-05-21 PROCEDURE — A9270 NON-COVERED ITEM OR SERVICE: HCPCS | Performed by: ORTHOPAEDIC SURGERY

## 2020-05-21 PROCEDURE — 25010000003 CEFAZOLIN IN DEXTROSE 2-4 GM/100ML-% SOLUTION: Performed by: ORTHOPAEDIC SURGERY

## 2020-05-21 PROCEDURE — 63710000001 MELATONIN 5 MG TABLET: Performed by: INTERNAL MEDICINE

## 2020-05-21 PROCEDURE — 73560 X-RAY EXAM OF KNEE 1 OR 2: CPT

## 2020-05-21 PROCEDURE — C1776 JOINT DEVICE (IMPLANTABLE): HCPCS | Performed by: ORTHOPAEDIC SURGERY

## 2020-05-21 PROCEDURE — A9270 NON-COVERED ITEM OR SERVICE: HCPCS | Performed by: INTERNAL MEDICINE

## 2020-05-21 PROCEDURE — 63710000001 MONTELUKAST 10 MG TABLET: Performed by: INTERNAL MEDICINE

## 2020-05-21 PROCEDURE — 25010000002 DEXAMETHASONE PER 1 MG: Performed by: NURSE ANESTHETIST, CERTIFIED REGISTERED

## 2020-05-21 PROCEDURE — 63710000001 ACETAMINOPHEN 325 MG TABLET: Performed by: ORTHOPAEDIC SURGERY

## 2020-05-21 PROCEDURE — 25010000002 HYDROMORPHONE PER 4 MG: Performed by: ORTHOPAEDIC SURGERY

## 2020-05-21 PROCEDURE — 97161 PT EVAL LOW COMPLEX 20 MIN: CPT

## 2020-05-21 PROCEDURE — C1713 ANCHOR/SCREW BN/BN,TIS/BN: HCPCS | Performed by: ORTHOPAEDIC SURGERY

## 2020-05-21 PROCEDURE — 25010000002 ROPIVACAINE PER 1 MG: Performed by: ORTHOPAEDIC SURGERY

## 2020-05-21 PROCEDURE — 25010000002 ROPIVACAINE PER 1 MG: Performed by: NURSE ANESTHETIST, CERTIFIED REGISTERED

## 2020-05-21 PROCEDURE — 63710000001 CETIRIZINE 10 MG TABLET: Performed by: INTERNAL MEDICINE

## 2020-05-21 PROCEDURE — 25010000002 ONDANSETRON PER 1 MG: Performed by: ORTHOPAEDIC SURGERY

## 2020-05-21 PROCEDURE — 63710000001 MUPIROCIN 2 % OINTMENT 1 G TUBE: Performed by: ORTHOPAEDIC SURGERY

## 2020-05-21 PROCEDURE — 63710000001 PREGABALIN 150 MG CAPSULE: Performed by: ORTHOPAEDIC SURGERY

## 2020-05-21 PROCEDURE — 27447 TOTAL KNEE ARTHROPLASTY: CPT | Performed by: ORTHOPAEDIC SURGERY

## 2020-05-21 PROCEDURE — 27447 TOTAL KNEE ARTHROPLASTY: CPT | Performed by: PHYSICIAN ASSISTANT

## 2020-05-21 PROCEDURE — 25010000002 PROPOFOL 10 MG/ML EMULSION: Performed by: NURSE ANESTHETIST, CERTIFIED REGISTERED

## 2020-05-21 PROCEDURE — A9270 NON-COVERED ITEM OR SERVICE: HCPCS | Performed by: ANESTHESIOLOGY

## 2020-05-21 PROCEDURE — 63710000001 FAMOTIDINE 20 MG TABLET: Performed by: ANESTHESIOLOGY

## 2020-05-21 PROCEDURE — 97116 GAIT TRAINING THERAPY: CPT

## 2020-05-21 PROCEDURE — S0260 H&P FOR SURGERY: HCPCS | Performed by: ORTHOPAEDIC SURGERY

## 2020-05-21 PROCEDURE — 63710000001 MELOXICAM 15 MG TABLET: Performed by: ORTHOPAEDIC SURGERY

## 2020-05-21 DEVICE — DEV CONTRL TISS STRATAFIX SYMM PDS PLUS VIL CT-1 45CM: Type: IMPLANTABLE DEVICE | Site: KNEE | Status: FUNCTIONAL

## 2020-05-21 DEVICE — GENESIS II NON-POROUS TIBIAL                                    BASEPLATE SIZE 3 LEFT
Type: IMPLANTABLE DEVICE | Site: KNEE | Status: FUNCTIONAL
Brand: GENESIS II

## 2020-05-21 DEVICE — LEGION NARROW POSTERIOR STABILIZED                                    OXINIUM SIZE 5N LEFT
Type: IMPLANTABLE DEVICE | Site: KNEE | Status: FUNCTIONAL
Brand: LEGION

## 2020-05-21 DEVICE — DEV CONTRL TISS STRATAFIX SPIRAL MNCRYL UD 3/0 PLS 60CM: Type: IMPLANTABLE DEVICE | Site: KNEE | Status: FUNCTIONAL

## 2020-05-21 DEVICE — CMT BONE SIMPLEX/P FULL DOSE 10/PK: Type: IMPLANTABLE DEVICE | Site: KNEE | Status: FUNCTIONAL

## 2020-05-21 DEVICE — IMPLANTABLE DEVICE: Type: IMPLANTABLE DEVICE | Site: KNEE | Status: FUNCTIONAL

## 2020-05-21 DEVICE — GEN II 7.5MM RESUR PAT 32MM
Type: IMPLANTABLE DEVICE | Site: KNEE | Status: FUNCTIONAL
Brand: GENESIS II

## 2020-05-21 DEVICE — LEGION PS HIGH FLEX XLPE SZ 3-4 10MM
Type: IMPLANTABLE DEVICE | Site: KNEE | Status: FUNCTIONAL
Brand: LEGION

## 2020-05-21 RX ORDER — PROMETHAZINE HYDROCHLORIDE 25 MG/ML
6.25 INJECTION, SOLUTION INTRAMUSCULAR; INTRAVENOUS ONCE AS NEEDED
Status: DISCONTINUED | OUTPATIENT
Start: 2020-05-21 | End: 2020-05-21 | Stop reason: HOSPADM

## 2020-05-21 RX ORDER — LIDOCAINE HYDROCHLORIDE 10 MG/ML
0.5 INJECTION, SOLUTION EPIDURAL; INFILTRATION; INTRACAUDAL; PERINEURAL ONCE AS NEEDED
Status: COMPLETED | OUTPATIENT
Start: 2020-05-21 | End: 2020-05-21

## 2020-05-21 RX ORDER — PROMETHAZINE HYDROCHLORIDE 25 MG/1
25 SUPPOSITORY RECTAL ONCE AS NEEDED
Status: DISCONTINUED | OUTPATIENT
Start: 2020-05-21 | End: 2020-05-21 | Stop reason: HOSPADM

## 2020-05-21 RX ORDER — SODIUM CHLORIDE 0.9 % (FLUSH) 0.9 %
1-10 SYRINGE (ML) INJECTION AS NEEDED
Status: DISCONTINUED | OUTPATIENT
Start: 2020-05-21 | End: 2020-05-22 | Stop reason: HOSPADM

## 2020-05-21 RX ORDER — ACETAMINOPHEN 500 MG
1000 TABLET ORAL ONCE
Status: COMPLETED | OUTPATIENT
Start: 2020-05-21 | End: 2020-05-21

## 2020-05-21 RX ORDER — BUPIVACAINE HYDROCHLORIDE 2.5 MG/ML
INJECTION, SOLUTION EPIDURAL; INFILTRATION; INTRACAUDAL
Status: COMPLETED | OUTPATIENT
Start: 2020-05-21 | End: 2020-05-21

## 2020-05-21 RX ORDER — MORPHINE SULFATE 4 MG/ML
4 INJECTION, SOLUTION INTRAMUSCULAR; INTRAVENOUS
Status: DISCONTINUED | OUTPATIENT
Start: 2020-05-21 | End: 2020-05-22 | Stop reason: HOSPADM

## 2020-05-21 RX ORDER — CEFAZOLIN SODIUM 2 G/100ML
2 INJECTION, SOLUTION INTRAVENOUS EVERY 8 HOURS
Status: COMPLETED | OUTPATIENT
Start: 2020-05-21 | End: 2020-05-22

## 2020-05-21 RX ORDER — PROPOFOL 10 MG/ML
VIAL (ML) INTRAVENOUS AS NEEDED
Status: DISCONTINUED | OUTPATIENT
Start: 2020-05-21 | End: 2020-05-21 | Stop reason: SURG

## 2020-05-21 RX ORDER — PREGABALIN 150 MG/1
150 CAPSULE ORAL ONCE
Status: COMPLETED | OUTPATIENT
Start: 2020-05-21 | End: 2020-05-21

## 2020-05-21 RX ORDER — MAGNESIUM HYDROXIDE 1200 MG/15ML
LIQUID ORAL AS NEEDED
Status: DISCONTINUED | OUTPATIENT
Start: 2020-05-21 | End: 2020-05-21 | Stop reason: HOSPADM

## 2020-05-21 RX ORDER — DEXAMETHASONE SODIUM PHOSPHATE 4 MG/ML
INJECTION, SOLUTION INTRA-ARTICULAR; INTRALESIONAL; INTRAMUSCULAR; INTRAVENOUS; SOFT TISSUE AS NEEDED
Status: DISCONTINUED | OUTPATIENT
Start: 2020-05-21 | End: 2020-05-21 | Stop reason: SURG

## 2020-05-21 RX ORDER — FAMOTIDINE 10 MG/ML
20 INJECTION, SOLUTION INTRAVENOUS ONCE
Status: CANCELLED | OUTPATIENT
Start: 2020-05-21 | End: 2020-05-21

## 2020-05-21 RX ORDER — ACETAMINOPHEN 650 MG/1
650 SUPPOSITORY RECTAL EVERY 4 HOURS PRN
Status: DISCONTINUED | OUTPATIENT
Start: 2020-05-21 | End: 2020-05-22 | Stop reason: HOSPADM

## 2020-05-21 RX ORDER — ONDANSETRON 4 MG/1
4 TABLET, FILM COATED ORAL EVERY 6 HOURS PRN
Status: DISCONTINUED | OUTPATIENT
Start: 2020-05-21 | End: 2020-05-22 | Stop reason: HOSPADM

## 2020-05-21 RX ORDER — ASPIRIN 325 MG
325 TABLET, DELAYED RELEASE (ENTERIC COATED) ORAL DAILY
Status: DISCONTINUED | OUTPATIENT
Start: 2020-05-22 | End: 2020-05-22 | Stop reason: HOSPADM

## 2020-05-21 RX ORDER — ACETAMINOPHEN 325 MG/1
650 TABLET ORAL EVERY 4 HOURS PRN
Status: DISCONTINUED | OUTPATIENT
Start: 2020-05-21 | End: 2020-05-22 | Stop reason: HOSPADM

## 2020-05-21 RX ORDER — MELOXICAM 15 MG/1
15 TABLET ORAL ONCE
Status: COMPLETED | OUTPATIENT
Start: 2020-05-21 | End: 2020-05-21

## 2020-05-21 RX ORDER — NALOXONE HCL 0.4 MG/ML
0.1 VIAL (ML) INJECTION
Status: DISCONTINUED | OUTPATIENT
Start: 2020-05-21 | End: 2020-05-22 | Stop reason: HOSPADM

## 2020-05-21 RX ORDER — ONDANSETRON 2 MG/ML
4 INJECTION INTRAMUSCULAR; INTRAVENOUS EVERY 6 HOURS PRN
Status: DISCONTINUED | OUTPATIENT
Start: 2020-05-21 | End: 2020-05-22 | Stop reason: HOSPADM

## 2020-05-21 RX ORDER — SODIUM CHLORIDE, SODIUM LACTATE, POTASSIUM CHLORIDE, CALCIUM CHLORIDE 600; 310; 30; 20 MG/100ML; MG/100ML; MG/100ML; MG/100ML
9 INJECTION, SOLUTION INTRAVENOUS CONTINUOUS
Status: DISCONTINUED | OUTPATIENT
Start: 2020-05-21 | End: 2020-05-21

## 2020-05-21 RX ORDER — CETIRIZINE HYDROCHLORIDE 10 MG/1
10 TABLET ORAL DAILY
Status: DISCONTINUED | OUTPATIENT
Start: 2020-05-21 | End: 2020-05-22 | Stop reason: HOSPADM

## 2020-05-21 RX ORDER — LABETALOL HYDROCHLORIDE 5 MG/ML
10 INJECTION, SOLUTION INTRAVENOUS EVERY 4 HOURS PRN
Status: DISCONTINUED | OUTPATIENT
Start: 2020-05-21 | End: 2020-05-22 | Stop reason: HOSPADM

## 2020-05-21 RX ORDER — SODIUM CHLORIDE 0.9 % (FLUSH) 0.9 %
10 SYRINGE (ML) INJECTION AS NEEDED
Status: CANCELLED | OUTPATIENT
Start: 2020-05-21

## 2020-05-21 RX ORDER — ROPIVACAINE HYDROCHLORIDE 5 MG/ML
INJECTION, SOLUTION EPIDURAL; INFILTRATION; PERINEURAL AS NEEDED
Status: DISCONTINUED | OUTPATIENT
Start: 2020-05-21 | End: 2020-05-21 | Stop reason: HOSPADM

## 2020-05-21 RX ORDER — PROMETHAZINE HYDROCHLORIDE 25 MG/1
25 TABLET ORAL ONCE AS NEEDED
Status: DISCONTINUED | OUTPATIENT
Start: 2020-05-21 | End: 2020-05-21 | Stop reason: HOSPADM

## 2020-05-21 RX ORDER — OXYCODONE HYDROCHLORIDE 5 MG/1
5 TABLET ORAL EVERY 4 HOURS PRN
Status: DISCONTINUED | OUTPATIENT
Start: 2020-05-21 | End: 2020-05-22

## 2020-05-21 RX ORDER — HYDROMORPHONE HYDROCHLORIDE 1 MG/ML
0.5 INJECTION, SOLUTION INTRAMUSCULAR; INTRAVENOUS; SUBCUTANEOUS
Status: DISCONTINUED | OUTPATIENT
Start: 2020-05-21 | End: 2020-05-21 | Stop reason: HOSPADM

## 2020-05-21 RX ORDER — SODIUM CHLORIDE 9 MG/ML
120 INJECTION, SOLUTION INTRAVENOUS CONTINUOUS
Status: DISCONTINUED | OUTPATIENT
Start: 2020-05-21 | End: 2020-05-22 | Stop reason: HOSPADM

## 2020-05-21 RX ORDER — MONTELUKAST SODIUM 10 MG/1
10 TABLET ORAL NIGHTLY
Status: DISCONTINUED | OUTPATIENT
Start: 2020-05-21 | End: 2020-05-22 | Stop reason: HOSPADM

## 2020-05-21 RX ORDER — SODIUM CHLORIDE 0.9 % (FLUSH) 0.9 %
3 SYRINGE (ML) INJECTION EVERY 12 HOURS SCHEDULED
Status: DISCONTINUED | OUTPATIENT
Start: 2020-05-21 | End: 2020-05-22 | Stop reason: HOSPADM

## 2020-05-21 RX ORDER — CHOLECALCIFEROL (VITAMIN D3) 125 MCG
10 CAPSULE ORAL NIGHTLY
Status: DISCONTINUED | OUTPATIENT
Start: 2020-05-21 | End: 2020-05-22 | Stop reason: HOSPADM

## 2020-05-21 RX ORDER — FAMOTIDINE 20 MG/1
20 TABLET, FILM COATED ORAL ONCE
Status: COMPLETED | OUTPATIENT
Start: 2020-05-21 | End: 2020-05-21

## 2020-05-21 RX ORDER — FENTANYL CITRATE 50 UG/ML
50 INJECTION, SOLUTION INTRAMUSCULAR; INTRAVENOUS
Status: DISCONTINUED | OUTPATIENT
Start: 2020-05-21 | End: 2020-05-21 | Stop reason: HOSPADM

## 2020-05-21 RX ORDER — CEFAZOLIN SODIUM 2 G/100ML
2 INJECTION, SOLUTION INTRAVENOUS ONCE
Status: COMPLETED | OUTPATIENT
Start: 2020-05-21 | End: 2020-05-21

## 2020-05-21 RX ORDER — HYDROMORPHONE HYDROCHLORIDE 1 MG/ML
0.5 INJECTION, SOLUTION INTRAMUSCULAR; INTRAVENOUS; SUBCUTANEOUS
Status: DISCONTINUED | OUTPATIENT
Start: 2020-05-21 | End: 2020-05-22 | Stop reason: HOSPADM

## 2020-05-21 RX ORDER — SODIUM CHLORIDE 0.9 % (FLUSH) 0.9 %
10 SYRINGE (ML) INJECTION EVERY 12 HOURS SCHEDULED
Status: CANCELLED | OUTPATIENT
Start: 2020-05-21

## 2020-05-21 RX ORDER — BUPIVACAINE HYDROCHLORIDE 5 MG/ML
INJECTION, SOLUTION PERINEURAL
Status: COMPLETED | OUTPATIENT
Start: 2020-05-21 | End: 2020-05-21

## 2020-05-21 RX ORDER — MELOXICAM 7.5 MG/1
15 TABLET ORAL DAILY
Status: DISCONTINUED | OUTPATIENT
Start: 2020-05-22 | End: 2020-05-22 | Stop reason: HOSPADM

## 2020-05-21 RX ORDER — ONDANSETRON 2 MG/ML
INJECTION INTRAMUSCULAR; INTRAVENOUS AS NEEDED
Status: DISCONTINUED | OUTPATIENT
Start: 2020-05-21 | End: 2020-05-21 | Stop reason: SURG

## 2020-05-21 RX ORDER — NALOXONE HCL 0.4 MG/ML
0.4 VIAL (ML) INJECTION
Status: DISCONTINUED | OUTPATIENT
Start: 2020-05-21 | End: 2020-05-22 | Stop reason: HOSPADM

## 2020-05-21 RX ORDER — FLUTICASONE PROPIONATE 50 MCG
2 SPRAY, SUSPENSION (ML) NASAL DAILY
Status: DISCONTINUED | OUTPATIENT
Start: 2020-05-21 | End: 2020-05-22 | Stop reason: HOSPADM

## 2020-05-21 RX ADMIN — DEXAMETHASONE SODIUM PHOSPHATE 8 MG: 4 INJECTION, SOLUTION INTRAMUSCULAR; INTRAVENOUS at 10:19

## 2020-05-21 RX ADMIN — TRANEXAMIC ACID 1000 MG: 100 INJECTION, SOLUTION INTRAVENOUS at 10:11

## 2020-05-21 RX ADMIN — HYDROMORPHONE HYDROCHLORIDE 0.5 MG: 1 INJECTION, SOLUTION INTRAMUSCULAR; INTRAVENOUS; SUBCUTANEOUS at 14:26

## 2020-05-21 RX ADMIN — CEFAZOLIN SODIUM 2 G: 2 INJECTION, SOLUTION INTRAVENOUS at 10:03

## 2020-05-21 RX ADMIN — ACETAMINOPHEN 1000 MG: 325 TABLET ORAL at 09:14

## 2020-05-21 RX ADMIN — PREGABALIN 150 MG: 150 CAPSULE ORAL at 09:10

## 2020-05-21 RX ADMIN — MUPIROCIN 1 APPLICATION: 20 OINTMENT TOPICAL at 09:10

## 2020-05-21 RX ADMIN — OXYCODONE HYDROCHLORIDE 5 MG: 5 TABLET ORAL at 15:17

## 2020-05-21 RX ADMIN — ONDANSETRON 4 MG: 2 INJECTION INTRAMUSCULAR; INTRAVENOUS at 20:41

## 2020-05-21 RX ADMIN — PROPOFOL 30 MG: 10 INJECTION, EMULSION INTRAVENOUS at 10:11

## 2020-05-21 RX ADMIN — ROPIVACAINE HYDROCHLORIDE 10 ML/HR: 5 INJECTION, SOLUTION EPIDURAL; INFILTRATION; PERINEURAL at 12:11

## 2020-05-21 RX ADMIN — PROPOFOL 75 MCG/KG/MIN: 10 INJECTION, EMULSION INTRAVENOUS at 10:11

## 2020-05-21 RX ADMIN — SODIUM CHLORIDE 120 ML/HR: 9 INJECTION, SOLUTION INTRAVENOUS at 13:24

## 2020-05-21 RX ADMIN — MELOXICAM 15 MG: 15 TABLET ORAL at 09:10

## 2020-05-21 RX ADMIN — MONTELUKAST SODIUM 10 MG: 10 TABLET, COATED ORAL at 20:35

## 2020-05-21 RX ADMIN — PROPOFOL 40 MG: 10 INJECTION, EMULSION INTRAVENOUS at 10:06

## 2020-05-21 RX ADMIN — BUPIVACAINE HYDROCHLORIDE 30 ML: 2.5 INJECTION, SOLUTION EPIDURAL; INFILTRATION; INTRACAUDAL; PERINEURAL at 12:04

## 2020-05-21 RX ADMIN — SODIUM CHLORIDE, POTASSIUM CHLORIDE, SODIUM LACTATE AND CALCIUM CHLORIDE 9 ML/HR: 600; 310; 30; 20 INJECTION, SOLUTION INTRAVENOUS at 09:11

## 2020-05-21 RX ADMIN — BUPIVACAINE HYDROCHLORIDE 2 ML: 5 INJECTION, SOLUTION PERINEURAL at 10:08

## 2020-05-21 RX ADMIN — FAMOTIDINE 20 MG: 20 TABLET, FILM COATED ORAL at 09:10

## 2020-05-21 RX ADMIN — LIDOCAINE HYDROCHLORIDE 0.2 ML: 10 INJECTION, SOLUTION EPIDURAL; INFILTRATION; INTRACAUDAL; PERINEURAL at 09:11

## 2020-05-21 RX ADMIN — CEFAZOLIN SODIUM 2 G: 2 INJECTION, SOLUTION INTRAVENOUS at 18:00

## 2020-05-21 RX ADMIN — OXYCODONE HYDROCHLORIDE 5 MG: 5 TABLET ORAL at 20:35

## 2020-05-21 RX ADMIN — ONDANSETRON 4 MG: 2 INJECTION INTRAMUSCULAR; INTRAVENOUS at 11:40

## 2020-05-21 RX ADMIN — TRANEXAMIC ACID 1000 MG: 100 INJECTION, SOLUTION INTRAVENOUS at 10:54

## 2020-05-21 RX ADMIN — CETIRIZINE HYDROCHLORIDE 10 MG: 10 TABLET, FILM COATED ORAL at 14:26

## 2020-05-21 RX ADMIN — MELATONIN TAB 5 MG 10 MG: 5 TAB at 20:35

## 2020-05-21 ASSESSMENT — KOOS JR
KOOS JR SCORE: 47.487
KOOS JR SCORE: 16

## 2020-05-21 NOTE — ANESTHESIA PROCEDURE NOTES
Left Adductor Canal Catheter      Patient reassessed immediately prior to procedure    Patient location during procedure: post-op  Reason for block: at surgeon's request and post-op pain management  Performed by  CRNA: Hina Jacobo CRNA  Assisted by: Sigifredo Duque MD  Preanesthetic Checklist  Completed: patient identified, site marked, surgical consent, pre-op evaluation, timeout performed, IV checked, risks and benefits discussed and monitors and equipment checked  Prep:  Pt Position: supine  Sterile barriers:cap, gloves, mask and sterile barriers  Prep: ChloraPrep  Patient monitoring: blood pressure monitoring, continuous pulse oximetry and EKG  Procedure  Performed under: spinal  Guidance:ultrasound guided  Images:still images obtained, printed/placed on chart    Block Type:adductor canal block  Injection Technique:catheter  Needle Type:Tuohy and echogenic  Needle Gauge:18 G  Resistance on Injection: none  Catheter Size:20 G (20g)  Cath Depth at skin: 10 cm    Medications Used: bupivacaine PF (MARCAINE) 0.25 % injection, 30 mL  Med admintered at 5/21/2020 12:04 PM      Post Assessment  Injection Assessment: negative aspiration for heme, incremental injection and no paresthesia on injection  Patient Tolerance:comfortable throughout block  Complications:no  Additional Notes  Procedure:             The pt was placed in the Supine position.  The Insertion site was  prepped and Draped in sterile fashion.  The pt was anesthetized with  IV Sedation( see meds).  Skin and cutaneous tissue was infiltrated and anesthetized with 1% Lidocaine 3 mls via a 25g needle.  A BBraun 4 inch 18g echogenic needle was then  inserted approximately midline, mid-thigh and advanced In-plane with Ultrasound guidance.  Normal Saline PSF was utilized for hydrodissection of tissue.  The Vastus medialis and Sartorius muscle where visualized and the needle tip was placed in the adductor canal,  lateral to the femoral artery.  LA  injection spread was visualized, injection was incremental 1-5ml, injection pressure was normal or little, no intraneural injection, no vascular injection.  LA dose was injected thru the needle(see dose above).  A BBraun 20g wire stylet catheter was placed via the needle with ultrasound visualization and confirmation with NS fluid bolus. The labeled Catheter was then secured to skin at insertion site with skin afix and steristrips to curled catheter and CHG transparent dressing.  Thank you.

## 2020-05-21 NOTE — H&P
Pre-Op H&P  Asiya Cleaning  [id number]  1945    Chief complaint: Left knee pain    HPI:  2/17/20 office visit:It has been 2  month(s) since Ms. Cleaning's last visit. She returns to clinic today for follow-up of left knee pain. She rates her pain a 3/10 on the pain scale, but can be severe and has been activity limiting, and she has reached the point where she would like to consider knee replacement surgery.. Previous/current treatments: bracing, cane/walker, NSAIDS and steroid injection (last injection 12/19/19). Current symptoms: same as prior visit. The pain is worse with walking, standing, sitting, climbing stairs, sleeping, working and leisure; resting and ice improve the pain. Overall, she is doing the same.  Pain has been ongoing since last July 2019, and she would like to proceed with knee replacement surgery.  No history of clots nor clotting disorders.    5/21/20 interval update:  Here today to undergo left total knee arthroplasty    Review of Systems:  General ROS: negative for chills, fever or skin lesions;  No changes since last office visit.  Neg for recent sick exposure  Cardiovascular ROS: no chest pain or dyspnea on exertion  Respiratory ROS: +baseline prod cough, shortness of breath, or wheezing.  +pulm clearance with Dr. Matthews (See epic note)    Allergies:   Allergies   Allergen Reactions   • Penicillins Itching   • Doxycycline Rash       Home Meds:    No current facility-administered medications on file prior to encounter.      Current Outpatient Medications on File Prior to Encounter   Medication Sig Dispense Refill   • mupirocin (BACTROBAN) 2 % ointment Apply into the nostril(s) as directed by provider 2 (Two) Times a Day. 22 g 0   • Chlorhexidine Gluconate 4 % solution Shower with solution as directed for 5 days prior to surgery 237 mL 0   • ibuprofen (ADVIL,MOTRIN) 200 MG tablet Take 200 mg by mouth Every 6 (Six) Hours As Needed for Mild Pain .     • LUTEIN PO Take  by mouth.      • melatonin 5 MG tablet tablet Take 10 mg by mouth Every Night.         PMH:   Past Medical History:   Diagnosis Date   • Allergic rhinitis    • Baker's cyst of knee, left    • Bronchitis    • Depression    • Hearing loss     LEFT EAR NO AIDS   • Influenza    • Macular hole     HX OF   • Osteoarthritis    • PNA (pneumonia)    • Seasonal allergies    • Wears glasses      PSH:    Past Surgical History:   Procedure Laterality Date   • CATARACT EXTRACTION Left 2010   • COLONOSCOPY  2008    clear   • DENTAL PROCEDURE      extraction   • DENTAL PROCEDURE      IMPLANT   • VITRECTOMY PARS PLANA W/ REPAIR OF MACULAR HOLE  2009     Social History:   Tobacco:   Social History     Tobacco Use   Smoking Status Never Smoker   Smokeless Tobacco Never Used      Alcohol:     Social History     Substance and Sexual Activity   Alcohol Use Never   • Frequency: Never       Vitals:           /86 (BP Location: Right arm, Patient Position: Sitting)   Pulse 82   Temp 97.5 °F (36.4 °C) (Tympanic)   Resp 18   SpO2 95%   Breastfeeding No     Physical Exam:  General Appearance:    Alert, cooperative, no distress, appears stated age   Head:    Normocephalic, without obvious abnormality, atraumatic   Lungs:     Clear to auscultation bilaterally, respirations unlabored    Heart:   Regular rate and rhythm, S1 and S2 normal, no murmur, rub    or gallop    Abdomen:    Soft, nontender.  +bowel sounds   Breast Exam:    deferred   Genitalia:    deferred   Extremities:   Extremities normal, atraumatic, no cyanosis or edema   Skin:   Skin color, texture, turgor normal, no rashes or lesions   Neurologic:   Grossly intact   Results Review  LABS:  Lab Results   Component Value Date    WBC 4.32 05/18/2020    HGB 13.6 05/18/2020    HCT 41.2 05/18/2020    MCV 94.5 05/18/2020     05/18/2020    NEUTROABS 2.24 05/18/2020    GLUCOSE 120 (H) 05/18/2020    BUN 8 05/18/2020    CREATININE 0.75 05/18/2020    EGFRIFNONA 76 05/18/2020      05/18/2020    K 3.8 05/18/2020     05/18/2020    CO2 28.0 05/18/2020    CALCIUM 9.4 05/18/2020       RADIOLOGY:  Imaging Results (Last 72 Hours)     ** No results found for the last 72 hours. **          I reviewed the patient's new clinical results.    Cancer Staging (if applicable)  Cancer Patient: __ yes _x_no __unknown; If yes, clinical stage T:__ N:__M:__, stage group or __N/A    Impression/Plan:    1. Primary osteoarthritis of left knee        Her left knee pain continues to progress, and she would like to proceed with knee replacement surgery.  Risk, benefits, and alternatives to the procedure have been discussed.  Please see my counseling note for details.  Of note, she did not tolerate hydrocodone very well in the past, and tolerates oxycodone better.     Surgical Counseling      I have informed the patient of the diagnosis and the prognosis.  Exhaustive conservative treatment modalities have not resulted in long term pain relief.  The symptoms have progressed to the point of daily pain and inability to perform activities of daily living without significant pain. The patient has reached the point of desiring to proceed with total knee arthroplasty after discussing the risks, benefits and alternatives to the procedure.  The surgical procedure itself was discussed in detail. Risks of the procedure were discussed, which included but are not limited to, bleeding, infection, damage to blood vessels and nerves, incomplete pain relief, loosening of the prosthesis, deep infection, need for further surgery, loss of limb, deep venous thrombosis, pulmonary embolus, death, heart attack, stroke, kidney failure, liver failure, and anesthetic complications.  In addition, the potential for deep infection developing in the future was discussed, which could require further surgery.  The knee would have to be re-opened, debrided, and potentially remove the prosthesis, which may or may not be replaced in the future.   Also, the possibility for loosening of the prosthesis has been mentioned.  If the prosthesis loosened, a revision arthroplasty could be performed, with results that are not as predictable compared to the original procedure.  The typical rehabilitative course has also been discussed, and full recovery may take up to a year to see the maximum benefit.  The importance of patient cooperation in the rehabilitative efforts has also been discussed.  No guarantees whatsoever were given.  The patient understands the potential risks versus the benefits and desires to proceed with total knee arthroplasty    Shaunna Mak, JANET   5/21/2020   08:58     Agree with above.  Plan for left total knee arthroplasty.    Hadley Gibson MD  05/21/20  09:13

## 2020-05-21 NOTE — ANESTHESIA POSTPROCEDURE EVALUATION
Patient: Asiya Cleaning    Procedure Summary     Date:  05/21/20 Room / Location:   [facility] /  [facility]    Anesthesia Start:  1001 Anesthesia Stop:      Procedure:  TOTAL KNEE ARTHROPLASTY LEFT (Left Knee) Diagnosis:       Primary osteoarthritis of left knee      (Primary osteoarthritis of left knee [M17.12])    Surgeon:  Hadley Gibson MD Provider:  Sigifredo Duque MD    Anesthesia Type:  MAC, spinal ASA Status:  2          Anesthesia Type: MAC, spinal    Vitals  Vitals Value Taken Time   /78 5/21/2020 12:10 PM   Temp     Pulse 83 5/21/2020 12:12 PM   Resp     SpO2 99 % 5/21/2020 12:12 PM   Vitals shown include unvalidated device data.        Post Anesthesia Care and Evaluation    Patient location during evaluation: PACU  Patient participation: complete - patient participated  Level of consciousness: awake and responsive to verbal stimuli  Pain score: 0  Pain management: adequate  Airway patency: patent  Anesthetic complications: No anesthetic complications    Cardiovascular status: stable  Respiratory status: acceptable, nasal cannula and spontaneous ventilation  Hydration status: stable    Comments: Pt transferred to PACU with O2. Vital signs stable. Report to PACU RN and care accepted.

## 2020-05-21 NOTE — THERAPY EVALUATION
Patient Name: Asiya Cleaning  : 1945    MRN: [id number]                              Today's Date: 2020       Admit Date: 2020    Visit Dx:     ICD-10-CM ICD-9-CM   1. Primary osteoarthritis of left knee M17.12 715.16     Patient Active Problem List   Diagnosis   • Primary osteoarthritis of left knee   • Cough   • Seasonal allergic rhinitis due to pollen   • S/P total knee arthroplasty, left   • Depression     Past Medical History:   Diagnosis Date   • Allergic rhinitis    • Baker's cyst of knee, left    • Bronchitis    • Depression    • Hearing loss     LEFT EAR NO AIDS   • Influenza    • Macular hole     HX OF   • Osteoarthritis    • PNA (pneumonia)    • Seasonal allergies    • Wears glasses      Past Surgical History:   Procedure Laterality Date   • CATARACT EXTRACTION Left    • COLONOSCOPY      clear   • DENTAL PROCEDURE      extraction   • DENTAL PROCEDURE      IMPLANT   • VITRECTOMY PARS PLANA W/ REPAIR OF MACULAR HOLE       General Information     Row Name 20 1430          PT Evaluation Time/Intention    Document Type  evaluation  -ALESSANDRA     Mode of Treatment  individual therapy;physical therapy  -     Row Name 20 1430          General Information    Patient Profile Reviewed?  yes  -ALESSANDRA     Prior Level of Function  min assist:;transfer;bed mobility;ADL's;all household mobility  -ALESSANDRA     Existing Precautions/Restrictions  fall;other (see comments) L adductor nerve catheter  -     Barriers to Rehab  none identified  -ALESSANDRA     Row Name 20 1430          Relationship/Environment    Lives With  child(ayan), adult  -     Row Name 20 1430          Resource/Environmental Concerns    Current Living Arrangements  home/apartment/condo  -     Row Name 20 1430          Home Main Entrance    Number of Stairs, Main Entrance  five  -ALESSANDRA     Stair Railings, Main Entrance  railing on right side (ascending)  -     Row Name 20 1430          Stairs Within Home,  Primary    Stairs, Within Home, Primary  0  -ALESSANDRA     Number of Stairs, Within Home, Primary  none  -ALESSANDRA     Row Name 05/21/20 1430          Cognitive Assessment/Intervention- PT/OT    Orientation Status (Cognition)  oriented x 4  -ALESSANDRA     Row Name 05/21/20 1430          Safety Issues, Functional Mobility    Safety Issues Affecting Function (Mobility)  safety precautions follow-through/compliance;safety precaution awareness  -ALESSANDRA     Impairments Affecting Function (Mobility)  pain;endurance/activity tolerance;strength;range of motion (ROM);balance  -ALESSANDRA     Comment, Safety Issues/Impairments (Mobility)  Pt feeling dizzy and nauseous throughout treatment session  -       User Key  (r) = Recorded By, (t) = Taken By, (c) = Cosigned By    Initials Name Provider Type    ALESSANDRA Maurilio Burgess, PT Physical Therapist        Mobility     Row Name 05/21/20 1430          Bed Mobility Assessment/Treatment    Bed Mobility Assessment/Treatment  scooting/bridging;supine-sit  -ALESSANDRA     Scooting/Bridging [place] (Bed Mobility)  verbal cues;minimum assist (75% patient effort)  -ALESSANDRA     Supine-Sit [place] (Bed Mobility)  verbal cues;minimum assist (75% patient effort)  -     Assistive Device (Bed Mobility)  bed rails;head of bed elevated  -     Comment (Bed Mobility)  Verbal cues for LE/trunk management with bed mobility; min A for LE management  -     Row Name 05/21/20 1430          Transfer Assessment/Treatment    Comment (Transfers)  Verbal cues for safe hand placement during standing/sitting and moving L LE out for comfort prior to sitting; pt experiencing dizziness when seated EOB with BP measured at 113/69, and symptoms subsiding with rest  -     Row Name 05/21/20 1430          Sit-Stand Transfer    Sit-Stand [place] (Transfers)  verbal cues;contact guard;2 person assist  -     Assistive Device (Sit-Stand Transfers)  walker, front-wheeled  -     Row Name 05/21/20 1430          Gait/Stairs Assessment/Training     Gait/Stairs Assessment/Training  gait/ambulation independence;gait/ambulation assistive device  -ALESSANDRA     [place] Level (Gait)  verbal cues;contact guard;2 person assist  -ALESSANDRA     Assistive Device (Gait)  walker, front-wheeled  -ALESSANDRA     Distance in Feet (Gait)  120 feet  -ALESSANDRA     Pattern (Gait)  step-through  -ALESSANDRA     Deviations/Abnormal Patterns (Gait)  bilateral deviations;juan decreased;gait speed decreased;stride length decreased  -ALESSANDRA     Bilateral Gait Deviations  forward flexed posture  -ALESSANDRA     Left Sided Gait Deviations  heel strike decreased;weight shift ability decreased  -ALESSANDRA     [place] Level (Stairs)  not tested  -ALESSANDRA     Comment (Gait/Stairs)  Pt ambulated with step through pattern and decreased speed. Verbal cues for maintaining upright posture, body within walker, increase step length, and WB on L LE. No knee buckling noted with gait. Gait limited by dizziness/nausea.   -     Row Name 05/21/20 1430          Mobility Assessment/Intervention    Extremity Weight-bearing Status  left lower extremity  -ALESSANDRA     Left Lower Extremity (Weight-bearing Status)  weight-bearing as tolerated (WBAT)  -       User Key  (r) = Recorded By, (t) = Taken By, (c) = Cosigned By    Initials Name Provider Type    Maurilio Shankar, PT Physical Therapist        Obj/Interventions     Row Name 05/21/20 1430          General ROM    GENERAL ROM COMMENTS  R LE AROM WFL; L LE AROM impaired 25%; able to actively DF/PF  -ALESSANDRA     Row Name 05/21/20 1430          MMT (Manual Muscle Testing)    General MMT Comments  R LE functionally 4+/5; L LE functionally 4-/5; IND with SLR  -ALESSANDRA     Row Name 05/21/20 1430          Therapeutic Exercise    Lower Extremity (Therapeutic Exercise)  gluteal sets;quad sets, bilateral  -ALESSANDRA     Lower Extremity Range of Motion (Therapeutic Exercise)  ankle dorsiflexion/plantar flexion, bilateral  -ALESSANDRA     Exercise Type (Therapeutic Exercise)  AROM (active range of motion);isometric contraction, static  -ALESSANDRA      Position (Therapeutic Exercise)  seated  -ALESSANDRA     Sets/Reps (Therapeutic Exercise)  10x each  -ALESSANDRA     Expected Outcome (Therapeutic Exercise)  facilitate normal movement patterns;improve functional tolerance, self-care activity  -ALESSANDRA     Comment (Therapeutic Exercise)  Cues for technique  -ALESSANDRA     Row Name 05/21/20 1430          Sensory Assessment/Intervention    Sensory General Assessment  no sensation deficits identified  -ALESSANDRA       User Key  (r) = Recorded By, (t) = Taken By, (c) = Cosigned By    Initials Name Provider Type    ALESSANDRA Maurilio Burgess, PT Physical Therapist        Goals/Plan     Row Name 05/21/20 1430          Bed Mobility Goal 1 (PT)    Activity/Assistive Device (Bed Mobility Goal 1, PT)  sit to supine/supine to sit  -ALESSANDRA     [place] Level/Cues Needed (Bed Mobility Goal 1, PT)  conditional independence  -ALESSANDRA     Time Frame (Bed Mobility Goal 1, PT)  long term goal (LTG);3 days  -ALESSANDRA     Row Name 05/21/20 1430          Transfer Goal 1 (PT)    Activity/Assistive Device (Transfer Goal 1, PT)  sit-to-stand/stand-to-sit;walker, rolling  -ALESSANDRA     [place] Level/Cues Needed (Transfer Goal 1, PT)  conditional independence  -ALESSANDRA     Time Frame (Transfer Goal 1, PT)  long term goal (LTG);3 days  -ALESSANDRA     Row Name 05/21/20 1430          Gait Training Goal 1 (PT)    Activity/Assistive Device (Gait Training Goal 1, PT)  gait (walking locomotion);walker, rolling  -ALESSANDRA     [place] Level (Gait Training Goal 1, PT)  conditional independence  -ALESSANDRA     Distance (Gait Goal 1, PT)  300 feet  -ALESSANDRA     Time Frame (Gait Training Goal 1, PT)  long term goal (LTG);3 days  -ALESSANDRA     Row Name 05/21/20 1430          ROM Goal 1 (PT)    ROM Goal 1 (PT)  L knee AROM 0-90 degrees  -ALESSANDRA     Time Frame (ROM Goal 1, PT)  long term goal (LTG);3 days  -ALESSANDRA     Row Name 05/21/20 1430          Stairs Goal 1 (PT)    Activity/Assistive Device (Stairs Goal 1, PT)  stairs, all skills;using handrail, right;cane, straight  -ALESSANDRA     [place] Level/Cues  Needed (Stairs Goal 1, PT)  contact guard assist  -ALESSANDRA     Number of Stairs (Stairs Goal 1, PT)  5  -ALESSANDRA     Time Frame (Stairs Goal 1, PT)  long term goal (LTG);3 days  -ALESSANDRA       User Key  (r) = Recorded By, (t) = Taken By, (c) = Cosigned By    Initials Name Provider Type    Maurilio Shankar, PT Physical Therapist        Clinical Impression     Row Name 05/21/20 1430          Pain Assessment    Additional Documentation  Pain Scale: Numbers Pre/Post-Treatment (Group)  -ALESSANDRA     Row Name 05/21/20 1430          Pain Scale: Numbers Pre/Post-Treatment    Pain Scale: Numbers, Pretreatment  5/10  -ALESSANDRA     Pain Scale: Numbers, Post-Treatment  6/10  -ALESSANDRA     Pain Location - Side  Left  -ALESSANDRA     Pain Location - Orientation  anterior  -ALESSANDRA     Pain Location  knee  -ALESSANDRA     Pain Intervention(s)  Repositioned;Ambulation/increased activity;Cold applied  -ALESSANDRA     Row Name 05/21/20 1430          Physical Therapy Clinical Impression    Patient/Family Goals Statement (PT Clinical Impression)  To return home  -ALESSANDRA     Criteria for Skilled Interventions Met (PT Clinical Impression)  yes;treatment indicated  -ALESSANDRA     Rehab Potential (PT Clinical Summary)  good, to achieve stated therapy goals  -ALESSANDRA     Row Name 05/21/20 1430          Vital Signs    Intra Systolic BP Rehab  113  -ALESSANDRA     Intra Treatment Diastolic BP  69  -ALESSANDRA     Post Systolic BP Rehab  94 RN made aware  -ALESSANDRA     Post Treatment Diastolic BP  61  -ALESSANDRA     Row Name 05/21/20 1430          Positioning and Restraints    Pre-Treatment Position  in bed  -ALESSANDRA     Post Treatment Position  chair  -ALESSANDRA     In Chair  notified nsg;reclined;call light within reach;encouraged to call for assist;exit alarm on;legs elevated  -ALESSANDRA       User Key  (r) = Recorded By, (t) = Taken By, (c) = Cosigned By    Initials Name Provider Type    Maurilio Shankar, PT Physical Therapist        Outcome Measures     Row Name 05/21/20 1430          How much help from another person do you currently need...    Turning from your back  to your side while in flat bed without using bedrails?  3  -ALESSANDRA     Moving from lying on back to sitting on the side of a flat bed without bedrails?  3  -ALESSANDRA     Moving to and from a bed to a chair (including a wheelchair)?  3  -ALESSANDRA     Standing up from a chair using your arms (e.g., wheelchair, bedside chair)?  3  -ALESSANDRA     Climbing 3-5 steps with a railing?  2  -ALESSANDRA     To walk in hospital room?  3  -ALESSANDRA     AM-PAC 6 Clicks Score (PT)  17  -ALESSANDRA     Row Name 05/21/20 1430          PADD    Diagnosis  1  -ALESSANDRA     Gender  1  -ALESSANDRA     Age Group  1  -ALESSANDRA     Gait Distance  0  -ALESSANDRA     Assist Level  1  -ALESSANDRA     Home Support  3  -ALESSANDRA     PADD Score  7  -ALESSANDRA     Patient Preference  home with home health  -ALESSANDRA     Prediction by PADD Score  extended rehabilitation  -ALESSANDRA     Row Name 05/21/20 1430          Functional Assessment    Outcome Measure Options  AM-PAC 6 Clicks Basic Mobility (PT);PADD  -ALESSANDRA       User Key  (r) = Recorded By, (t) = Taken By, (c) = Cosigned By    Initials Name Provider Type    Maurilio Shankar, PT Physical Therapist        Physical Therapy Education                 Title: PT OT SLP Therapies (Done)     Topic: Physical Therapy (Done)     Point: Mobility training (Done)     Description:   Instruct learner(s) on safety and technique for assisting patient out of bed, chair or wheelchair.  Instruct in the proper use of assistive devices, such as walker, crutches, cane or brace.              Patient Friendly Description:   It's important to get you on your feet again, but we need to do so in a way that is safe for you. Falling has serious consequences, and your personal safety is the most important thing of all.        When it's time to get out of bed, one of us or a family member will sit next to you on the bed to give you support.     If your doctor or nurse tells you to use a walker, crutches, a cane, or a brace, be sure you use it every time you get out of bed, even if you think you don't need it.    Learning Progress  Summary           Patient Acceptance, E,D, VU by ALESSANDRA at 5/21/2020 1430    Comment:  Educated on safe sequencing with bed mobility, ambulatory transfers, and gait. Reviewed HEP and WB status.                   Point: Home exercise program (Done)     Description:   Instruct learner(s) on appropriate technique for monitoring, assisting and/or progressing patient with therapeutic exercises and activities.              Learning Progress Summary           Patient Acceptance, E,D, VU by ALESSANDRA at 5/21/2020 1430    Comment:  Educated on safe sequencing with bed mobility, ambulatory transfers, and gait. Reviewed HEP and WB status.                   Point: Body mechanics (Done)     Description:   Instruct learner(s) on proper positioning and spine alignment for patient and/or caregiver during mobility tasks and/or exercises.              Learning Progress Summary           Patient Acceptance, E,D, VU by ALESSANDRA at 5/21/2020 1430    Comment:  Educated on safe sequencing with bed mobility, ambulatory transfers, and gait. Reviewed HEP and WB status.                   Point: Precautions (Done)     Description:   Instruct learner(s) on prescribed precautions during mobility and gait tasks              Learning Progress Summary           Patient Acceptance, E,D, VU by ALESSANDRA at 5/21/2020 1430    Comment:  Educated on safe sequencing with bed mobility, ambulatory transfers, and gait. Reviewed HEP and WB status.                               User Key     Initials Effective Dates Name Provider Type Discipline     09/10/19 -  Maurilio Burgess, PT Physical Therapist PT              PT Recommendation and Plan  Planned Therapy Interventions (PT Eval): balance training, bed mobility training, gait training, home exercise program, patient/family education, strengthening, stair training, transfer training  Outcome Summary/Treatment Plan (PT)  Anticipated Equipment Needs at Discharge (PT): other (see comments)(none)  Anticipated Discharge Disposition (PT):  home with assist, home with home health  Plan of Care Reviewed With: patient  Progress: improving  Outcome Summary: PT eval complete. Pt ambulatd 120 feet using RW and CGA x2 for safety. Gait limited by dizziness/nausea. Bed mobility requiring min A and STS requiring CGA. Pt experiencing dizziness when seated EOB, BP measured at 113/69, symptoms subsiding with rest. Pt BP measured at 94/61 at the conclusion of the session, with RN notified. Reviewed HEP and WB status. Pt IND with SLR. Will assess L knee AROM POD#1. PADD score = 7. Recommend pt d/c home with assist and HHPT when appropriate.     Time Calculation:   PT Charges     Row Name 05/21/20 1430             Time Calculation    Start Time  1430  -      PT Received On  05/21/20  -ALESSANDRA      PT Goal Re-Cert Due Date  05/31/20  -ALESSANDRA         Time Calculation- PT    Total Timed Code Minutes- PT  10 minute(s)  -ALESSANDRA         Timed Charges    44420 - PT Therapeutic Exercise Minutes  2  -ALESSANDRA      73648 - Gait Training Minutes   8  -ALESSANDRA        User Key  (r) = Recorded By, (t) = Taken By, (c) = Cosigned By    Initials Name Provider Type    Maurilio Shankar, PT Physical Therapist        Therapy Charges for Today     Code Description Service Date Service Provider Modifiers Qty    16938957613 HC GAIT TRAINING EA 15 MIN 5/21/2020 Maurilio Burgess, PT GP 1    [id number] HC PT EVAL LOW COMPLEXITY 3 5/21/2020 Maurilio Burgess, PT GP 1          PT G-Codes  Outcome Measure Options: AM-PAC 6 Clicks Basic Mobility (PT), PADD  AM-PAC 6 Clicks Score (PT): 17    Maurilio Burgess PT  5/21/2020

## 2020-05-21 NOTE — PLAN OF CARE
Problem: Patient Care Overview  Goal: Plan of Care Review  Outcome: Ongoing (interventions implemented as appropriate)  Flowsheets (Taken 5/21/2020 5322)  Progress: improving  Plan of Care Reviewed With: patient  Outcome Summary: Pt arrived to floor from PACU. VSS. A&Ox4. Due to void. Arrow pump @ 10ml/hr and bolus given by anethesia. C/O of pain relieved by bolus, PO and IV pain meds. Pt ambulated with PT in room this shift. ACE CDI. Continue to monitor.  Goal: Individualization and Mutuality  Outcome: Ongoing (interventions implemented as appropriate)  Goal: Discharge Needs Assessment  Outcome: Ongoing (interventions implemented as appropriate)  Goal: Interprofessional Rounds/Family Conf  Outcome: Ongoing (interventions implemented as appropriate)

## 2020-05-21 NOTE — OP NOTE
DATE OF PROCEDURE: 20    PREOPERATIVE DIAGNOSIS: left knee arthritis      POSTOPERATIVE DIAGNOSIS:  left knee arthritis    PROCEDURES PERFORMED:   left total knee arthroplasty with [employer] components (#5 narrow posterior stabilized femur, #3 tibia, 10 mm polyethylene, with 32 three peg patella).     SURGEON: Hadley Gibson MD    ASSISTANT: Miguelina Ware PA-C  (Miguelina Ware PA-C was present and necessary for positioning, draping, retraction, instrumentation and closure.)    SPECIMENS: None    IMPLANTS:   Implants     Implant    Sut Contrl Tiss Stratafix Spiral Mncryl Ud 3/0 Pls 60cm - [id number] - Implanted   (Left) Knee    Inventory item: SUT CONTRL TISS STRATAFIX SPIRAL MNCRYL UD 3/0 PLS 60CM Model/Cat number: [id number]    : [employer]  DIV [employer] J [employer] J Lot number: [id number]    As of 2020     Status: Implanted                  Sut Contrl Tiss Stratafix Symm Pds Plus Lamberto Ct-1 45cm - [id number] - Implanted   (Left) Knee    Inventory item: SUT CONTRL TISS STRATAFIX SYMM PDS PLUS LAMBERTO CT-1 45CM Model/Cat number: [id number]    : [employer]  DIV OF J AND J Lot number: [id number]    As of 2020     Status: Implanted                  Cmt Bone Simplex/P Full Dose 10/Pk - [id number] - Implanted   (Left) Knee    Inventory item: CMT BONE SIMPLEX/P FULL DOSE 10/PK Model/Cat number: [id number]    : [employer] Lot number: [id number]    As of 2020     Status: Implanted                  Patella Resrf Gen2 7.5x32mm - [id number] - Implanted   (Left) Knee    Inventory item: PATELLA RESRF GEN2 7.5X32MM Model/Cat number: [id number]    : [employer] Lot number: [id number]    As of 2020     Status: Implanted                  Base Tib/Kn Gen2 Nonpor Ti Sz3 Lt - [id number] - Implanted   (Left) Knee    Inventory item: BASE TIB/KN GEN2 NONPOR TI SZ3 LT Model/Cat number: [id number]    : [employer] Lot number: [id number]    As of 2020      Status: Implanted                  Comp Fem Legion Oxinium Ps Nrw Sz5n Lt - [id number] - Implanted   (Left) Knee    Inventory item: COMP FEM LEGION OXINIUM PS NRW SZ5N LT Model/Cat number: [id number]    : [employer] Lot number: [id number]    As of 5/21/2020     Status: Implanted                  Insrt Art Legion Ps Hf Xlpe Sz3to4 10mm - [id number] - Implanted   (Left) Knee    Inventory item: INSRT ART LEGION PS HF XLPE SZ3TO4 10MM Model/Cat number: [id number]    : [employer] Lot number: [id number]    As of 5/21/2020     Status: Implanted                         ANESTHESIA:  Spinal    STAFF:  Circulator: Ila Carrera RN  Scrub Person: Abebe Sung  Nursing Assistant: Jennifer Mcgee  Assistant: Miguelina Ware PA-C  Orientee: Waleska George    TOURNIQUET TIME: 15 minutes    ESTIMATED BLOOD LOSS: 110 mL     COMPLICATIONS: None    PREOPERATIVE ANTIBIOTICS: Ancef 2 g    INDICATIONS: The patient is a 74 y.o. female with debilitating left knee pain secondary to osteoarthritis that failed to improve in spite of conservative treatment .  Options have been discussed at length with the patient and the patient has had an extended course of conservative treatment without long-term benefit. The patient has reached the point where the patient desires total knee arthroplasty surgery and understands the risks, benefits, and alternatives. Consent was obtained. Please see my office notes for details with regard to preoperative counseling and operative rationale.     DESCRIPTION OF PROCEDURE: The patient was positively identified in the preoperative holding area and brought to the operating suite and placed in a supine position. After adequate spinal anesthetic had been achieved, the left lower extremity was prepped and draped in the usual sterile fashion.  After application of a tourniquet to the left upper thigh, which was used during the procedure for a total 15 minutes during the  cementation process only. Landmarks of the knee were identified and timeout procedure was performed to confirm the operative site, as well as other parameters. Following the sterile prep and drape, a skin incision was made just off the medial aspect of midline for a medial parapatellar approach. Following a sharp skin incision, dissection was carried down to the level of the extensor mechanism and a medial parapatellar arthrotomy was made and the patella was tucked into the lateral gutter. Description of arthritis: Bone-on-bone contact medial compartment, with tricompartmental osteophytes, varus alignment, and advanced patellofemoral degeneration.  The knee was adequately exposed and a distal femoral cut was made with an intramedullary guide. This was subsequently sized for a #5 implant and anterior, posterior chamfer cuts made. The menisci removed both medially and laterally.  The ACL was transected and the tibia was subluxed anteriorly. Proximal tibia cut was made with the external alignment guide. The cut was noted to be perpendicular to the tibial axis, with symmetric flexion and extension gaps. Therefore, final tibial preparations to accommodate a #3 tibia were made, followed by final femoral preparations for the box region. With a trial 10 insert in place, full flexion and extension was noted with no instability. The patella was then prepared for a 32 three peg patella which had excellent tracking. All trial components were removed and final components were cemented in place with namely a #3 tibia, #5 narrow posterior stabilized femur and a 32 three peg patella with a trial 11 insert for cement compression. All the excess cement was removed from the bone implant interface and allowed to harden. Tourniquet was deflated. Hemostasis was obtained with electrocautery. There was no brisk bleeding noted in the popliteal fossa in particular. Therefore, the knee was copiously irrigated as it was between major steps,  and the final 10 insert was placed as this was deemed appropriate for the patient's anatomy with full flexion and extension and no instability and attention was then directed towards closure. The medial parapatellar arthrotomy was closed with #1 Vicryl in an interrupted figure-of-eight fashion in 4 strategic locations followed by oversewing this from proximal to distal with a #1 StrataFix symmetric, which nicely sealed the joint, followed by closure of the deep fascial layer with #1 Vicryl in a buried interrupted fashion, followed by closure of the subcutaneous layer with 2-0 Vicryl and the skin with 3-0 Stratafix in a running subcuticular fashion. Prineo wound closure dressing was applied followed by a sterile dressing with 4 x 4's, abdominal pad, soft roll and Ace wrap. The patient tolerated the procedure well and was brought to the recovery room in good condition.     PLAN:  1.  The patient will begin early range of motion and weight-bearing per the post total knee arthroplasty protocol.   2.  I anticipate brief hospitalization for initial rehabilitation and pain control followed by continued rehabilitation in either home health or outpatient physical therapy setting.  3.  Postoperative medical management with Dr. Colon.  4.  Aspirin will be utilized for DVT prophylaxis unless there is a contraindication.       Hadley Gibson MD  05/21/20  11:32    Dragon disclaimer:  Much of this encounter note is an electronic transcription/translation of spoken language to printed text. The electronic translation of spoken language may permit erroneous, or at times, nonsensical words or phrases to be inadvertently transcribed; Although I have reviewed the note for such errors, some may still exist.

## 2020-05-21 NOTE — H&P
Patient Name: Asiya Cleaning  MRN: [id number]  : 1945  DOS: 2020    Attending: Hadley Gibson MD    Primary Care Provider: Arthur Winston MD      Chief complaint: Left knee Pain.    Subjective      Patient is a pleasant 74 y.o. female presented for scheduled surgery by Dr. Gibson  Per his note (INDICATIONS: The patient is a 74 y.o. female with debilitating left knee pain secondary to osteoarthritis that failed to improve in spite of conservative treatment .  Options have been discussed at length with the patient and the patient has had an extended course of conservative treatment without long-term benefit. The patient has reached the point where the patient desires total knee arthroplasty surgery and understands the risks, benefits, and alternatives. Consent was obtained. Please see my office notes for details with regard to preoperative counseling and operative rationale. )    She underwent left total knee arthroplasty under spinal anesthesia, tolerated well.  Adductor canal nerve block catheter was placed postoperatively.    Seen in her room after surgery, doing well, had mild dizziness on sitting on the side of bed with PT but feels better now and is ready to ambulate.    Good pain control.  No fever chills, nausea or vomiting.    No history of DVT or PE.    Allergies   Allergen Reactions   • Penicillins Itching   • Doxycycline Rash       Meds:  Medications Prior to Admission   Medication Sig Dispense Refill Last Dose   • acetaminophen (TYLENOL) 325 MG tablet Take 650 mg by mouth Every 6 (Six) Hours As Needed for Mild Pain .   2020 at Unknown time   • CBD (cannabidiol) oral oil Take 1 mL by mouth Daily.   Past Week at Unknown time   • Chlorpheniramine-Codeine (Z-Tuss AC) 2-9 MG/5ML liquid Take  by mouth.   Past Month at Unknown time   • diphenhydrAMINE (BENADRYL) 25 mg capsule Take 25 mg by mouth At Night As Needed for Allergies.   Past Week at Unknown time   • fluticasone (FLONASE) 50  MCG/ACT nasal spray 2 sprays into the nostril(s) as directed by provider Daily.   Past Week at Unknown time   • melatonin 5 MG tablet tablet Take 10 mg by mouth Every Night.   Past Week at Unknown time   • montelukast (SINGULAIR) 10 MG tablet Take 1 tablet by mouth Every Night. 30 tablet 11 Past Week at Unknown time   • mupirocin (BACTROBAN) 2 % ointment Apply into the nostril(s) as directed by provider 2 (Two) Times a Day. 22 g 0 5/20/2020 at Unknown time   • vitamin C (ASCORBIC ACID) 500 MG tablet Take 1,000 mg by mouth Daily.   5/20/2020 at Unknown time   • cetirizine (zyrTEC) 10 MG tablet Take 10 mg by mouth Daily.   5/20/2020   • Chlorhexidine Gluconate 4 % solution Shower with solution as directed for 5 days prior to surgery 237 mL 0 5/20/2020   • Dexchlorpheniramine-Phenyleph (STAHIST PO) Take  by mouth.   More than a month at Unknown time   • ibuprofen (ADVIL,MOTRIN) 200 MG tablet Take 200 mg by mouth Every 6 (Six) Hours As Needed for Mild Pain .   More than a month at Unknown time   • LUTEIN PO Take  by mouth.   More than a month at Unknown time         History:   Past Medical History:   Diagnosis Date   • Allergic rhinitis    • Baker's cyst of knee, left    • Bronchitis    • Depression    • Hearing loss     LEFT EAR NO AIDS   • Influenza    • Macular hole     HX OF   • Osteoarthritis    • PNA (pneumonia)    • Seasonal allergies    • Wears glasses      Past Surgical History:   Procedure Laterality Date   • CATARACT EXTRACTION Left 2010   • COLONOSCOPY  2008    clear   • DENTAL PROCEDURE      extraction   • DENTAL PROCEDURE      IMPLANT   • VITRECTOMY PARS PLANA W/ REPAIR OF MACULAR HOLE  2009     Family History   Problem Relation Age of Onset   • Osteoarthritis Mother    • Pneumonia Mother    • Dementia Father      Social History     Tobacco Use   • Smoking status: Never Smoker   • Smokeless tobacco: Never Used   Substance Use Topics   • Alcohol use: Never     Frequency: Never   • Drug use: Never   .   .  Has 2 children.  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    Review of Systems  Pertinent items are noted in HPI, all other systems reviewed and negative    Vital Signs  /82 (BP Location: Right arm, Patient Position: Lying)   Pulse 79   Temp 97.6 °F (36.4 °C) (Temporal)   Resp 12   SpO2 97%   Breastfeeding No     Physical Exam:    General Appearance:    Alert, cooperative, in no acute distress   Head:    Normocephalic, without obvious abnormality, atraumatic   Eyes:            Lids and lashes normal, conjunctivae and sclerae normal, no   icterus, no pallor, corneas clear    Ears:    Ears appear intact with no abnormalities noted   Throat:   No oral lesions, no thrush, oral mucosa moist   Neck:   No adenopathy, supple, trachea midline, no thyromegaly         Lungs:     Clear to auscultation,respirations regular, even and                   unlabored    Heart:    Regular rhythm and normal rate, normal S1 and S2, no       murmur, no gallop   Abdomen:     Normal bowel sounds, no masses, no organomegaly, soft        non-tender, non-distended, no guarding, no rebound                 tenderness   Genitalia:    Deferred   Extremities:  Left LE, CDI dressing on knee, PNB cath present.  Adductor canal nerve block catheter with arrow pump   Pulses:   Pulses palpable and equal bilaterally   Skin:   No bleeding, bruising or rash   Neurologic:   Cranial nerves 2 - 12 grossly intact, intact flexion dorsiflexion bilateral feet      I reviewed the patient's new clinical results.       Results from last 7 days   Lab Units 05/18/20  1342   WBC 10*3/mm3 4.32   HEMOGLOBIN g/dL 13.6   HEMATOCRIT % 41.2   PLATELETS 10*3/mm3 194     Results from last 7 days   Lab Units 05/18/20  1342   SODIUM mmol/L 144   POTASSIUM mmol/L 3.8   CHLORIDE mmol/L 106   CO2 mmol/L 28.0   BUN mg/dL 8   CREATININE mg/dL 0.75   CALCIUM mg/dL 9.4   GLUCOSE mg/dL 120*     No results found for: HGBA1C        Assessment and Plan:       S/P total  knee arthroplasty, left    Primary osteoarthritis of left knee    Depression      Plan  1. PT/OT,   Weight bearing as tolerated left LE  2. Pain control-prns, ACB cath with ropivacaine infusion.  3. IS-encourage  4. DVT proph- Mechanicals and aspirin  5. Bowel regimen  6. Resume home medications as appropriate  7. Monitor post-op labs  8. DC planning for home with home health PT    Discussed with patient postoperative management plan.  She expressed understanding and agreement.    Dragon disclaimer:  Part of this encounter note is an electronic transcription/translation of spoken language to printed text. The electronic translation of spoken language may permit erroneous, or at times, nonsensical words or phrases to be inadvertently transcribed; Although I have reviewed the note for such errors, some may still exist.    Diaz Colon MD  05/21/20  13:37

## 2020-05-21 NOTE — ANESTHESIA PROCEDURE NOTES
Spinal Block      Patient reassessed immediately prior to procedure    Patient location during procedure: OR  Indication:at surgeon's request  Performed By  CRNA: Hina Jacobo CRNA  Preanesthetic Checklist  Completed: patient identified, site marked, surgical consent, pre-op evaluation, timeout performed, IV checked, risks and benefits discussed and monitors and equipment checked  Spinal Block Prep:  Patient Position:sitting  Sterile Tech:cap, gloves, sterile barriers and mask  Prep:Chloraprep  Patient Monitoring:continuous pulse oximetry  Spinal Block Procedure  Approach:midline  Guidance:landmark technique and palpation technique  Location:L4-L5  Needle Type:Quincke  Needle Gauge:22 G  Placement of Spinal needle event:cerebrospinal fluid aspirated  Paresthesia: no  Fluid Appearance:clear  Medications: bupivacaine (MARCAINE) 0.5 % injection, 2 mL  Med Administered at 5/21/2020 10:08 AM   Post Assessment  Patient Tolerance:patient tolerated the procedure well with no apparent complications  Complications no  Additional Notes  Procedure:  Pt assisted to sitting position, with legs in position of comfort over side of bed.  Pt. instructed in optimal spine presentation, the spine was prepped/ Draped and the skin at insertion site was anesthetized with 1% Lidocaine 2 ml.  The spinal needle was then advanced until CSF flow was obtained and LA was injected:

## 2020-05-21 NOTE — ANESTHESIA PREPROCEDURE EVALUATION
Anesthesia Evaluation     Patient summary reviewed and Nursing notes reviewed                Airway   Mallampati: II  TM distance: >3 FB  Neck ROM: full  No difficulty expected  Dental - normal exam     Pulmonary - negative pulmonary ROS and normal exam   Cardiovascular - negative cardio ROS and normal exam        Neuro/Psych- negative ROS  GI/Hepatic/Renal/Endo - negative ROS     Musculoskeletal     Abdominal  - normal exam    Bowel sounds: normal.   Substance History - negative use     OB/GYN negative ob/gyn ROS         Other   arthritis,                      Anesthesia Plan    ASA 2     MAC and spinal   (Peripheral nerve block + cath for post op pain relief)  intravenous induction     Anesthetic plan, all risks, benefits, and alternatives have been provided, discussed and informed consent has been obtained with: patient.    Plan discussed with CRNA.

## 2020-05-21 NOTE — PLAN OF CARE
Problem: Patient Care Overview  Goal: Plan of Care Review  Flowsheets (Taken 5/21/2020 1430)  Progress: improving  Plan of Care Reviewed With: patient  Outcome Summary: PT eval complete. Pt ambulatd 120 feet using RW and CGA x2 for safety. Gait limited by dizziness/nausea. Bed mobility requiring min A and STS requiring CGA. Pt experiencing dizziness when seated EOB, BP measured at 113/69, symptoms subsiding with rest. Pt BP measured at 94/61 at the conclusion of the session, with RN notified. Reviewed HEP and WB status. Pt IND with SLR. Will assess L knee AROM POD#1. PADD score = 7. Recommend pt d/c home with assist and HHPT when appropriate.

## 2020-05-21 NOTE — PROGRESS NOTES
[facility]    Acute pain service Inpatient Progress Note    Patient Name: Asiya Cleaning  :  1945  MRN:  [id number]        Acute Pain  Service Inpatient Progress Note:    Analgesia:Fair  Pain Score:5/10  LOC: alert and awake  Resp Status: supplemental oxygen  Cardiac: VS stable  Side Effects:None  Catheter Site:clean, dressing intact and dry  Catheter type: Peripheral nerve cath on Arrow pump   Infusion rate: 10ml/hr  Anesthesia block local: Ropivacaine 0.5% 10mL bolus secondary to increase pain around the incision area. Pt describes it as an upside down horse shoe.  Catheter Plan:Catheter to remain Insitu and Continue catheter infusion rate unchanged  Comments: Pt checked secondary to increased pain even after pain medications. Ropivacaine 0.5% 10mL bolus given via ACB catheter. LISSETTE Ga aware of the bolus. ---------------------------------------------------------------------------------  Physical Therapy Manual Muscle Testing Results:  MMT (Manual Muscle Testing)  General MMT Comments: R LE functionally 4+/5; L LE functionally 4-/5; IND with SLR (20 143)]    Physical Therapy - Plan of Care Review - Outcome Summary:  Outcome Summary: PT eval complete. Pt ambulatd 120 feet using RW and CGA x2 for safety. Gait limited by dizziness/nausea. Bed mobility requiring min A and STS requiring CGA. Pt experiencing dizziness when seated EOB, BP measured at 113/69, symptoms subsiding with rest. Pt BP measured at 94/61 at the conclusion of the session, with RN notified. Reviewed HEP and WB status. Pt IND with SLR. Will assess L knee AROM POD#1. PADD score = 7. Recommend pt d/c home with assist and HHPT when appropriate. (20 143)]    Occupational Therapy - Plan of Care Review - Outcome Summary:   ]  ----------------------------------------------------------------------------------

## 2020-05-22 VITALS
RESPIRATION RATE: 16 BRPM | DIASTOLIC BLOOD PRESSURE: 57 MMHG | TEMPERATURE: 97.8 F | SYSTOLIC BLOOD PRESSURE: 107 MMHG | OXYGEN SATURATION: 98 % | HEART RATE: 74 BPM

## 2020-05-22 LAB
ANION GAP SERPL CALCULATED.3IONS-SCNC: 11 MMOL/L (ref 5–15)
BUN BLD-MCNC: 15 MG/DL (ref 8–23)
BUN/CREAT SERPL: 18.3 (ref 7–25)
CALCIUM SPEC-SCNC: 9.3 MG/DL (ref 8.6–10.5)
CHLORIDE SERPL-SCNC: 100 MMOL/L (ref 98–107)
CO2 SERPL-SCNC: 24 MMOL/L (ref 22–29)
CREAT BLD-MCNC: 0.82 MG/DL (ref 0.57–1)
DEPRECATED RDW RBC AUTO: 44.2 FL (ref 37–54)
ERYTHROCYTE [DISTWIDTH] IN BLOOD BY AUTOMATED COUNT: 12.7 % (ref 12.3–15.4)
GFR SERPL CREATININE-BSD FRML MDRD: 69 ML/MIN/1.73
GLUCOSE BLD-MCNC: 139 MG/DL (ref 65–99)
HCT VFR BLD AUTO: 36.9 % (ref 34–46.6)
HGB BLD-MCNC: 12.1 G/DL (ref 12–15.9)
MCH RBC QN AUTO: 31.3 PG (ref 26.6–33)
MCHC RBC AUTO-ENTMCNC: 32.8 G/DL (ref 31.5–35.7)
MCV RBC AUTO: 95.6 FL (ref 79–97)
PLATELET # BLD AUTO: 188 10*3/MM3 (ref 140–450)
PMV BLD AUTO: 10.8 FL (ref 6–12)
POTASSIUM BLD-SCNC: 3.8 MMOL/L (ref 3.5–5.2)
RBC # BLD AUTO: 3.86 10*6/MM3 (ref 3.77–5.28)
SODIUM BLD-SCNC: 135 MMOL/L (ref 136–145)
WBC NRBC COR # BLD: 11.29 10*3/MM3 (ref 3.4–10.8)

## 2020-05-22 PROCEDURE — 97535 SELF CARE MNGMENT TRAINING: CPT

## 2020-05-22 PROCEDURE — A9270 NON-COVERED ITEM OR SERVICE: HCPCS | Performed by: ORTHOPAEDIC SURGERY

## 2020-05-22 PROCEDURE — 63710000001 FLUTICASONE 50 MCG/ACT SUSPENSION 16 G BOTTLE: Performed by: INTERNAL MEDICINE

## 2020-05-22 PROCEDURE — A9270 NON-COVERED ITEM OR SERVICE: HCPCS | Performed by: INTERNAL MEDICINE

## 2020-05-22 PROCEDURE — 63710000001 OXYCODONE 5 MG TABLET: Performed by: INTERNAL MEDICINE

## 2020-05-22 PROCEDURE — 80048 BASIC METABOLIC PNL TOTAL CA: CPT | Performed by: INTERNAL MEDICINE

## 2020-05-22 PROCEDURE — 63710000001 OXYCODONE 5 MG TABLET: Performed by: ORTHOPAEDIC SURGERY

## 2020-05-22 PROCEDURE — 85027 COMPLETE CBC AUTOMATED: CPT | Performed by: ORTHOPAEDIC SURGERY

## 2020-05-22 PROCEDURE — 25010000003 CEFAZOLIN IN DEXTROSE 2-4 GM/100ML-% SOLUTION: Performed by: ORTHOPAEDIC SURGERY

## 2020-05-22 PROCEDURE — 97165 OT EVAL LOW COMPLEX 30 MIN: CPT

## 2020-05-22 PROCEDURE — 63710000001 POLYETHYLENE GLYCOL PACK: Performed by: INTERNAL MEDICINE

## 2020-05-22 PROCEDURE — 99024 POSTOP FOLLOW-UP VISIT: CPT | Performed by: ORTHOPAEDIC SURGERY

## 2020-05-22 PROCEDURE — 97110 THERAPEUTIC EXERCISES: CPT

## 2020-05-22 PROCEDURE — 25010000002 ONDANSETRON PER 1 MG: Performed by: ORTHOPAEDIC SURGERY

## 2020-05-22 PROCEDURE — 63710000001 MELOXICAM 7.5 MG TABLET: Performed by: ORTHOPAEDIC SURGERY

## 2020-05-22 PROCEDURE — 63710000001 ASPIRIN EC 325 MG TABLET DELAYED-RELEASE: Performed by: ORTHOPAEDIC SURGERY

## 2020-05-22 PROCEDURE — 63710000001 ACETAMINOPHEN 325 MG TABLET: Performed by: ORTHOPAEDIC SURGERY

## 2020-05-22 PROCEDURE — 63710000001 CETIRIZINE 10 MG TABLET: Performed by: INTERNAL MEDICINE

## 2020-05-22 PROCEDURE — 97116 GAIT TRAINING THERAPY: CPT

## 2020-05-22 RX ORDER — OXYCODONE HYDROCHLORIDE 5 MG/1
5 TABLET ORAL EVERY 4 HOURS PRN
Qty: 50 TABLET | Refills: 0 | Status: SHIPPED | OUTPATIENT
Start: 2020-05-22 | End: 2020-06-01

## 2020-05-22 RX ORDER — POLYETHYLENE GLYCOL 3350 17 G/17G
17 POWDER, FOR SOLUTION ORAL ONCE
Status: COMPLETED | OUTPATIENT
Start: 2020-05-22 | End: 2020-05-22

## 2020-05-22 RX ORDER — OXYCODONE HYDROCHLORIDE 5 MG/1
5 TABLET ORAL EVERY 4 HOURS PRN
Status: DISCONTINUED | OUTPATIENT
Start: 2020-05-22 | End: 2020-05-22 | Stop reason: HOSPADM

## 2020-05-22 RX ORDER — IBUPROFEN 200 MG
200 TABLET ORAL EVERY 6 HOURS PRN
Status: ON HOLD
Start: 2020-05-22 | End: 2020-08-11 | Stop reason: SDUPTHER

## 2020-05-22 RX ORDER — OXYCODONE HYDROCHLORIDE 5 MG/1
10 TABLET ORAL EVERY 4 HOURS PRN
Status: DISCONTINUED | OUTPATIENT
Start: 2020-05-22 | End: 2020-05-22 | Stop reason: HOSPADM

## 2020-05-22 RX ORDER — DOCUSATE SODIUM 100 MG/1
100 CAPSULE, LIQUID FILLED ORAL 2 TIMES DAILY
Qty: 40 CAPSULE | Refills: 0 | Status: SHIPPED | OUTPATIENT
Start: 2020-05-22 | End: 2021-12-09

## 2020-05-22 RX ORDER — OXYCODONE HYDROCHLORIDE 5 MG/1
5 TABLET ORAL EVERY 4 HOURS PRN
Qty: 40 TABLET | Refills: 0 | Status: CANCELLED | OUTPATIENT
Start: 2020-05-22 | End: 2020-05-31

## 2020-05-22 RX ORDER — ACETAMINOPHEN 325 MG/1
650 TABLET ORAL EVERY 4 HOURS PRN
Start: 2020-05-22 | End: 2020-08-11 | Stop reason: HOSPADM

## 2020-05-22 RX ADMIN — SODIUM CHLORIDE 500 ML: 9 INJECTION, SOLUTION INTRAVENOUS at 11:14

## 2020-05-22 RX ADMIN — POLYETHYLENE GLYCOL 3350 17 G: 17 POWDER, FOR SOLUTION ORAL at 14:45

## 2020-05-22 RX ADMIN — ACETAMINOPHEN 650 MG: 325 TABLET, FILM COATED ORAL at 09:45

## 2020-05-22 RX ADMIN — OXYCODONE HYDROCHLORIDE 5 MG: 5 TABLET ORAL at 08:47

## 2020-05-22 RX ADMIN — OXYCODONE HYDROCHLORIDE 5 MG: 5 TABLET ORAL at 04:56

## 2020-05-22 RX ADMIN — MELOXICAM 15 MG: 7.5 TABLET ORAL at 08:48

## 2020-05-22 RX ADMIN — OXYCODONE HYDROCHLORIDE 5 MG: 5 TABLET ORAL at 00:57

## 2020-05-22 RX ADMIN — ASPIRIN 325 MG: 325 TABLET, COATED ORAL at 08:47

## 2020-05-22 RX ADMIN — CETIRIZINE HYDROCHLORIDE 10 MG: 10 TABLET, FILM COATED ORAL at 08:47

## 2020-05-22 RX ADMIN — FLUTICASONE PROPIONATE 2 SPRAY: 50 SPRAY, METERED NASAL at 08:48

## 2020-05-22 RX ADMIN — CEFAZOLIN SODIUM 2 G: 2 INJECTION, SOLUTION INTRAVENOUS at 01:48

## 2020-05-22 RX ADMIN — OXYCODONE 10 MG: 5 TABLET ORAL at 14:40

## 2020-05-22 RX ADMIN — ONDANSETRON 4 MG: 2 INJECTION INTRAMUSCULAR; INTRAVENOUS at 09:36

## 2020-05-22 NOTE — PLAN OF CARE
Problem: Patient Care Overview  Goal: Plan of Care Review  5/22/2020 1402 by Miguelina Segundo, PT  Flowsheets (Taken 5/22/2020 1357)  Progress: no change  Plan of Care Reviewed With: patient  Outcome Summary: P ambulates 168 ft in do with CGA, RWx with progression toward SBA. Pt CGA to SBA with sit<>stand t/f with RWx. HEP performed in totality this P.M.. Pt limited by pain. Pt cleared stairs in A.M. and ROM from A.M. was 9-75 LLE Knee ROM.  Pt cleared to go from PT standpoint, with more stable BP, dependent upon pt pain level.

## 2020-05-22 NOTE — THERAPY TREATMENT NOTE
Patient Name: Asiya Cleaning  : 1945    MRN: [id number]                              Today's Date: 2020       Admit Date: 2020    Visit Dx:     ICD-10-CM ICD-9-CM   1. Primary osteoarthritis of left knee M17.12 715.16     Patient Active Problem List   Diagnosis   • Primary osteoarthritis of left knee   • Cough   • Seasonal allergic rhinitis due to pollen   • S/P total knee arthroplasty, left   • Depression     Past Medical History:   Diagnosis Date   • Allergic rhinitis    • Baker's cyst of knee, left    • Bronchitis    • Depression    • Hearing loss     LEFT EAR NO AIDS   • Influenza    • Macular hole     HX OF   • Osteoarthritis    • PNA (pneumonia)    • Seasonal allergies    • Wears glasses      Past Surgical History:   Procedure Laterality Date   • CATARACT EXTRACTION Left    • COLONOSCOPY      clear   • DENTAL PROCEDURE      extraction   • DENTAL PROCEDURE      IMPLANT   • TOTAL KNEE ARTHROPLASTY Left 2020    Procedure: TOTAL KNEE ARTHROPLASTY LEFT;  Surgeon: Hadley Gibson MD;  Location: [facility];  Service: Orthopedics;  Laterality: Left;   • VITRECTOMY PARS PLANA W/ REPAIR OF MACULAR HOLE       General Information     Row Name 20 1034          PT Evaluation Time/Intention    Document Type  therapy note (daily note)  -EJ     Mode of Treatment  physical therapy  -EJ     Row Name 20 1034          General Information    Patient Profile Reviewed?  yes  -EJ     Existing Precautions/Restrictions  fall;other (see comments) L adductor canal nerve catheter.   -EJ     Row Name 20 1034          Cognitive Assessment/Intervention- PT/OT    Orientation Status (Cognition)  oriented x 4  -EJ     Row Name 20 1034          Safety Issues, Functional Mobility    Safety Issues Affecting Function (Mobility)  insight into deficits/self awareness;safety precautions follow-through/compliance;safety precaution awareness  -EJ     Impairments Affecting Function (Mobility)   pain;endurance/activity tolerance;strength;range of motion (ROM);balance  -EJ       User Key  (r) = Recorded By, (t) = Taken By, (c) = Cosigned By    Initials Name Provider Type    EJ Miguelina Segundo PT Physical Therapist        Mobility     Row Name 05/22/20 1035          Transfer Assessment/Treatment    Comment (Transfers)  [facility]. Pt's BP WNL when PT entered, OT finishing eval.   -EJ     Row Name 05/22/20 1035          Sit-Stand Transfer    Sit-Stand Crane (Transfers)  verbal cues;contact guard  -EJ     Assistive Device (Sit-Stand Transfers)  walker, front-wheeled  -EJ     Row Name 05/22/20 1035          Gait/Stairs Assessment/Training    Gait/Stairs Assessment/Training  gait/ambulation independence;gait/ambulation assistive device  -EJ     Crane Level (Gait)  verbal cues;contact guard;2 person assist  -EJ     Assistive Device (Gait)  walker, front-wheeled  -EJ     Distance in Feet (Gait)  217  -EJ     Pattern (Gait)  step-through  -EJ     Deviations/Abnormal Patterns (Gait)  bilateral deviations;juan decreased;gait speed decreased;stride length decreased  -EJ     Bilateral Gait Deviations  forward flexed posture  -EJ     Left Sided Gait Deviations  weight shift ability decreased  -EJ     Right Sided Gait Deviations  -- step length decreased  -EJ     Crane Level (Stairs)  verbal cues;contact guard  -EJ     Assistive Device (Stairs)  cane, straight  -EJ     Handrail Location (Stairs)  both sides;right side (ascending) Initially performed 2 steps BUE handrails. Progressed to 1 HR and cane.  -EJ     Number of Steps (Stairs)  4  -EJ     Ascending Technique (Stairs)  step-to-step  -EJ     Descending Technique (Stairs)  step-to-step  -EJ     Comment (Gait/Stairs)  Pt ambulated with step through gait pattern with forward flexed posture, decreased WB L and decreased step length right. Pt cued for correction with good improvement. small hesistation with RLE stance phase after longer step length  persisted.  -EJ     Row Name 05/22/20 1035          Mobility Assessment/Intervention    Extremity Weight-bearing Status  left lower extremity  -EJ     Left Lower Extremity (Weight-bearing Status)  weight-bearing as tolerated (WBAT)  -       User Key  (r) = Recorded By, (t) = Taken By, (c) = Cosigned By    Initials Name Provider Type    Miguelina Felix PT Physical Therapist        Obj/Interventions     Row Name 05/22/20 1048          General ROM    GENERAL ROM COMMENTS  LLE knee ROM lacking 9 degrees. to 95 degrees flexion.  -EJ     Row Name 05/22/20 1048          Therapeutic Exercise    Lower Extremity (Therapeutic Exercise)  SLR (straight leg raise), bilateral;gluteal sets;quad sets, bilateral;LAQ (long arc quad), left;heel slides, left;SAQ (short arc quad), left;SLR (straight leg raise), left  -EJ     Lower Extremity Range of Motion (Therapeutic Exercise)  ankle dorsiflexion/plantar flexion, bilateral;knee flexion/extension, left  -     Exercise Type (Therapeutic Exercise)  AROM (active range of motion);isometric contraction, static  -     Sets/Reps (Therapeutic Exercise)  15 each  -       User Key  (r) = Recorded By, (t) = Taken By, (c) = Cosigned By    Initials Name Provider Type    Miguelina Felix PT Physical Therapist        Goals/Plan    No documentation.       Clinical Impression     Row Name 05/22/20 1050          Pain Assessment    Additional Documentation  Pain Scale: FACES Pre/Post-Treatment (Group)  -EJ     Row Name 05/22/20 1050          Pain Scale: Numbers Pre/Post-Treatment    Pain Location - Side  Left  -     Pain Location - Orientation  lateral  -     Pain Location  knee  -     Pain Intervention(s)  Repositioned;Ambulation/increased activity  -EJ     Row Name 05/22/20 1050          Pain Scale: FACES Pre/Post-Treatment    Pain: FACES Scale, Pretreatment  4-->hurts little more  -     Pain: FACES Scale, Post-Treatment  4-->hurts little more  -EJ     Row Name 05/22/20 1050           Plan of Care Review    Plan of Care Reviewed With  patient  -EJ     Progress  improving  -EJ     Outcome Summary  Pt ambulates in do 217 ft with RWx, CGA. Pt also goes up/down 4 stairs with progression to cane +HR with home setup. Pt demonstates good step to technique. After stairs pt is assisted back to room in chair 2/2 dizziness and low BP. RN notified. BP limited ther ex to ROM focus, with ROM measured at lacking 9 degrees extension to 75 degrees flexion. PT to continue in P.M.  -EJ     Row Name 05/22/20 1050          Vital Signs    Pre Systolic BP Rehab  -- WNL with previous measurement, Pt with OT when PT entered.  -EJ     Intra Systolic BP Rehab  89  -EJ     Intra Treatment Diastolic BP  49  -EJ     Post Systolic BP Rehab  75  -EJ     Post Treatment Diastolic BP  46  -EJ     O2 Delivery Pre Treatment  room air  -EJ     O2 Delivery Intra Treatment  room air  -EJ     O2 Delivery Post Treatment  room air  -EJ     Pre Patient Position  Sitting  -EJ     Intra Patient Position  Standing  -EJ     Post Patient Position  Sitting  -EJ     Row Name 05/22/20 1050          Positioning and Restraints    Pre-Treatment Position  sitting in chair/recliner  -EJ     Post Treatment Position  chair  -EJ     In Chair  reclined;call light within reach;encouraged to call for assist;with nsg;exit alarm on;notified nsg  -EJ       User Key  (r) = Recorded By, (t) = Taken By, (c) = Cosigned By    Initials Name Provider Type    EJ Miguelina Segundo PT Physical Therapist        Outcome Measures     Row Name 05/22/20 1055          How much help from another person do you currently need...    Turning from your back to your side while in flat bed without using bedrails?  3  -EJ     Moving from lying on back to sitting on the side of a flat bed without bedrails?  3  -EJ     Moving to and from a bed to a chair (including a wheelchair)?  3  -EJ     Standing up from a chair using your arms (e.g., wheelchair, bedside chair)?  3  -EJ      Climbing 3-5 steps with a railing?  3  -EJ     To walk in hospital room?  3  -EJ     AM-PAC 6 Clicks Score (PT)  18  -EJ     Row Name 05/22/20 1058          Functional Assessment    Outcome Measure Options  AM-PAC 6 Clicks Basic Mobility (PT)  -EJ       User Key  (r) = Recorded By, (t) = Taken By, (c) = Cosigned By    Initials Name Provider Type    Miguelina Felix PT Physical Therapist        Physical Therapy Education                 Title: PT OT SLP Therapies (Done)     Topic: Physical Therapy (Done)     Point: Mobility training (Done)     Description:   Instruct learner(s) on safety and technique for assisting patient out of bed, chair or wheelchair.  Instruct in the proper use of assistive devices, such as walker, crutches, cane or brace.              Patient Friendly Description:   It's important to get you on your feet again, but we need to do so in a way that is safe for you. Falling has serious consequences, and your personal safety is the most important thing of all.        When it's time to get out of bed, one of us or a family member will sit next to you on the bed to give you support.     If your doctor or nurse tells you to use a walker, crutches, a cane, or a brace, be sure you use it every time you get out of bed, even if you think you don't need it.    Learning Progress Summary           Patient Acceptance, E,TB,H, VU,NR by VINH at 5/22/2020 1059    Acceptance, E,D, VU by ALESSANDRA at 5/21/2020 1430    Comment:  Educated on safe sequencing with bed mobility, ambulatory transfers, and gait. Reviewed HEP and WB status.                   Point: Home exercise program (Done)     Description:   Instruct learner(s) on appropriate technique for monitoring, assisting and/or progressing patient with therapeutic exercises and activities.              Learning Progress Summary           Patient Acceptance, E,TB,H, VU,NR by VINH at 5/22/2020 1059    Acceptance, E,D, VU by ALESSANDRA at 5/21/2020 1430    Comment:  Educated  on safe sequencing with bed mobility, ambulatory transfers, and gait. Reviewed HEP and WB status.                   Point: Body mechanics (Done)     Description:   Instruct learner(s) on proper positioning and spine alignment for patient and/or caregiver during mobility tasks and/or exercises.              Learning Progress Summary           Patient Acceptance, E,TB,H, VU,NR by  at 5/22/2020 1059    Acceptance, E,D, VU by ALESSANDRA at 5/21/2020 1430    Comment:  Educated on safe sequencing with bed mobility, ambulatory transfers, and gait. Reviewed HEP and WB status.                   Point: Precautions (Done)     Description:   Instruct learner(s) on prescribed precautions during mobility and gait tasks              Learning Progress Summary           Patient Acceptance, E,TB,H, VU,NR by VINH at 5/22/2020 1059    Acceptance, E,D, VU by ALESSANDRA at 5/21/2020 1430    Comment:  Educated on safe sequencing with bed mobility, ambulatory transfers, and gait. Reviewed HEP and WB status.                               User Key     Initials Effective Dates Name Provider Type Discipline     11/20/18 -  Miguelina Segundo, PT Physical Therapist PT     09/10/19 -  Maurilio Burgess, PT Physical Therapist PT              PT Recommendation and Plan     Plan of Care Reviewed With: patient  Progress: improving  Outcome Summary: Pt ambulates in do 217 ft with RWx, CGA. Pt also goes up/down 4 stairs with progression to cane +HR with home setup. Pt demonstates good step to technique. After stairs pt is assisted back to room in chair 2/2 dizziness and low BP. RN notified. BP limited ther ex to ROM focus, with ROM measured at lacking 9 degrees extension to 75 degrees flexion. PT to continue in P.M.     Time Calculation:   PT Charges     Row Name 05/22/20 1102             Time Calculation    Start Time  0834  -EJ      PT Received On  05/22/20  -      PT Goal Re-Cert Due Date  05/31/20  -         Time Calculation- PT    Total Timed Code Minutes- PT   55 minute(s)  -EJ         Timed Charges    05141 - PT Therapeutic Exercise Minutes  15  -EJ      47955 - Gait Training Minutes   40  -EJ        User Key  (r) = Recorded By, (t) = Taken By, (c) = Cosigned By    Initials Name Provider Type    Miguelina Felix, PT Physical Therapist        Therapy Charges for Today     Code Description Service Date Service Provider Modifiers Qty    91018594066 HC PT THER PROC EA 15 MIN 5/22/2020 Miguelina Segundo, PT GP 1    [id number] HC GAIT TRAINING EA 15 MIN 5/22/2020 Miguelina Segundo, PT GP 3          PT G-Codes  Outcome Measure Options: AM-PAC 6 Clicks Basic Mobility (PT)  AM-PAC 6 Clicks Score (PT): 18    Miguelina Lopez, PT  5/22/2020

## 2020-05-22 NOTE — THERAPY TREATMENT NOTE
Patient Name: Asiya Cleaning  : 1945    MRN: [id number]                              Today's Date: 2020       Admit Date: 2020    Visit Dx:     ICD-10-CM ICD-9-CM   1. S/P total knee arthroplasty, left Z96.652 V43.65   2. Primary osteoarthritis of left knee M17.12 715.16     Patient Active Problem List   Diagnosis   • Primary osteoarthritis of left knee   • Cough   • Seasonal allergic rhinitis due to pollen   • S/P total knee arthroplasty, left   • Depression     Past Medical History:   Diagnosis Date   • Allergic rhinitis    • Baker's cyst of knee, left    • Bronchitis    • Depression    • Hearing loss     LEFT EAR NO AIDS   • Influenza    • Macular hole     HX OF   • Osteoarthritis    • PNA (pneumonia)    • Seasonal allergies    • Wears glasses      Past Surgical History:   Procedure Laterality Date   • CATARACT EXTRACTION Left    • COLONOSCOPY      clear   • DENTAL PROCEDURE      extraction   • DENTAL PROCEDURE      IMPLANT   • TOTAL KNEE ARTHROPLASTY Left 2020    Procedure: TOTAL KNEE ARTHROPLASTY LEFT;  Surgeon: Hadley Gibson MD;  Location: [facility];  Service: Orthopedics;  Laterality: Left;   • VITRECTOMY PARS PLANA W/ REPAIR OF MACULAR HOLE       General Information     Row Name 20 1352 20 1034       PT Evaluation Time/Intention    Document Type  therapy note (daily note)  -EJ  therapy note (daily note)  -EJ    Mode of Treatment  physical therapy  -EJ  physical therapy  -EJ    Row Name 20 1352 20 1034       General Information    Patient Profile Reviewed?  yes  -EJ  yes  -EJ    Existing Precautions/Restrictions  fall;other (see comments) L adductor canal nerve catheter.   -EJ  fall;other (see comments) L adductor canal nerve catheter.   -EJ    Row Name 20 1352 20 1034       Cognitive Assessment/Intervention- PT/OT    Orientation Status (Cognition)  oriented x 4  -EJ  oriented x 4  -EJ    Row Name 20 1352 20 1034        Safety Issues, Functional Mobility    Safety Issues Affecting Function (Mobility)  --  insight into deficits/self awareness;safety precautions follow-through/compliance;safety precaution awareness  -EJ    Impairments Affecting Function (Mobility)  pain;endurance/activity tolerance;strength;range of motion (ROM);balance  -EJ  pain;endurance/activity tolerance;strength;range of motion (ROM);balance  -EJ      User Key  (r) = Recorded By, (t) = Taken By, (c) = Cosigned By    Initials Name Provider Type     Miguelina Segundo PT Physical Therapist        Mobility     Row Name 05/22/20 1353          Bed Mobility Assessment/Treatment    Comment (Bed Mobility)  Up in chair  -     Row Name 05/22/20 1035          Transfer Assessment/Treatment    Comment (Transfers)  UI. Pt's BP WNL when PT entered, OT finishing eval.   -     Row Name 05/22/20 1353 05/22/20 1035       Sit-Stand Transfer    Sit-Stand [place] (Transfers)  verbal cues;contact guard  -EJ  verbal cues;contact guard  -EJ    Assistive Device (Sit-Stand Transfers)  walker, front-wheeled  -EJ  walker, front-wheeled  -EJ    Row Name 05/22/20 1353 05/22/20 1035       Gait/Stairs Assessment/Training    Gait/Stairs Assessment/Training  gait/ambulation independence;gait/ambulation assistive device  -EJ  gait/ambulation independence;gait/ambulation assistive device  -EJ    [place] Level (Gait)  verbal cues;contact guard  -EJ  verbal cues;contact guard;2 person assist  -EJ    Assistive Device (Gait)  walker, front-wheeled  -EJ  walker, front-wheeled  -EJ    Distance in Feet (Gait)  168  -EJ  217  -EJ    Pattern (Gait)  step-through  -EJ  step-through  -EJ    Deviations/Abnormal Patterns (Gait)  bilateral deviations;juan decreased;gait speed decreased;stride length decreased  -EJ  bilateral deviations;juan decreased;gait speed decreased;stride length decreased  -EJ    Bilateral Gait Deviations  forward flexed posture  -EJ  forward flexed posture  -EJ     Left Sided Gait Deviations  weight shift ability decreased  -EJ  weight shift ability decreased  -EJ    Right Sided Gait Deviations  -- step length decreased  -EJ  -- step length decreased  -EJ    [place] Level (Stairs)  --  verbal cues;contact guard  -EJ    Assistive Device (Stairs)  --  cane, straight  -EJ    Handrail Location (Stairs)  --  both sides;right side (ascending) Initially performed 2 steps BUE handrails. Progressed to 1 HR and cane.  -EJ    Number of Steps (Stairs)  --  4  -EJ    Ascending Technique (Stairs)  --  step-to-step  -EJ    Descending Technique (Stairs)  --  step-to-step  -EJ    Comment (Gait/Stairs)  Pt ambulated with step through gait pattern, forward flexed posture, decreased WB L and decreased R step length with good correction.  -EJ  Pt ambulated with step through gait pattern with forward flexed posture, decreased WB L and decreased step length right. Pt cued for correction with good improvement. small hesistation with RLE stance phase after longer step length persisted.  -    Row Name 05/22/20 1353 05/22/20 1035       Mobility Assessment/Intervention    Extremity Weight-bearing Status  left lower extremity  -EJ  left lower extremity  -EJ    Left Lower Extremity (Weight-bearing Status)  weight-bearing as tolerated (WBAT)  -EJ  weight-bearing as tolerated (WBAT)  -EJ      User Key  (r) = Recorded By, (t) = Taken By, (c) = Cosigned By    Initials Name Provider Type     Miguelina Segundo PT Physical Therapist        Obj/Interventions     Row Name 05/22/20 1048          General ROM    GENERAL ROM COMMENTS  LLE knee ROM lacking 9 degrees. to 75 degrees flexion.  -     Row Name 05/22/20 1356 05/22/20 1048       Therapeutic Exercise    Lower Extremity (Therapeutic Exercise)  LAQ (long arc quad), left;SLR (straight leg raise), left;SAQ (short arc quad), left;quad sets, left;heel slides, left  -EJ  SLR (straight leg raise), bilateral;gluteal sets;quad sets, bilateral;LAQ (long arc  quad), left;heel slides, left;SAQ (short arc quad), left;SLR (straight leg raise), left  -EJ    Lower Extremity Range of Motion (Therapeutic Exercise)  ankle dorsiflexion/plantar flexion, bilateral;knee flexion/extension, left  -EJ  ankle dorsiflexion/plantar flexion, bilateral;knee flexion/extension, left  -EJ    Exercise Type (Therapeutic Exercise)  AROM (active range of motion)  -EJ  AROM (active range of motion);isometric contraction, static  -EJ    Position (Therapeutic Exercise)  seated  -EJ  --    Sets/Reps (Therapeutic Exercise)  15 each, extra time needed between reps/ exercises due to pain.  -EJ  15 each  -EJ      User Key  (r) = Recorded By, (t) = Taken By, (c) = Cosigned By    Initials Name Provider Type    EJ Miguelina Segundo PT Physical Therapist        Goals/Plan    No documentation.       Clinical Impression     Row Name 05/22/20 [facility] 05/22/20 1050       Pain Assessment    Additional Documentation  Pain Scale: FACES Pre/Post-Treatment (Group)  -EJ  Pain Scale: FACES Pre/Post-Treatment (Group)  -EJ    Row Name 05/22/20 [facility] 05/22/20 1050       Pain Scale: Numbers Pre/Post-Treatment    Pain Location - Side  Left  -EJ  Left  -EJ    Pain Location - Orientation  lateral  -EJ  lateral  -EJ    Pain Location  knee  -EJ  knee  -EJ    Pain Intervention(s)  Repositioned;Ambulation/increased activity  -EJ  Repositioned;Ambulation/increased activity  -EJ    Row Name 05/22/20 [facility] 05/22/20 1050       Pain Scale: FACES Pre/Post-Treatment    Pain: FACES Scale, Pretreatment  4-->hurts little more  -EJ  4-->hurts little more  -EJ    Pain: FACES Scale, Post-Treatment  4-->hurts little more  -EJ  4-->hurts little more  -EJ    Pre/Post Treatment Pain Comment  6/10 at worst.  -EJ  --    Row Name 05/22/20 [facility] 05/22/20 1050       Plan of Care Review    Plan of Care Reviewed With  patient  -EJ  patient  -EJ    Progress  no change  -EJ  improving  -EJ    Outcome Summary  P ambulates 168 ft in do with CGA, RWx with  progression toward SBA. Pt CGA to SBA with sit<>stand t/f with RWx. HEP performed in totality this P.M.. Pt limited by pain. Pt cleared stairs in A.M. and ROM from A.M. was 9-75 LLE Knee ROM.  Pt cleared to go from PT standpoint, with more stable BP, dependent upon pt pain level.  -EJ  Pt ambulates in do 217 ft with RWx, CGA. Pt also goes up/down 4 stairs with progression to cane +HR with home setup. Pt demonstates good step to technique. After stairs pt is assisted back to room in chair 2/2 dizziness and low BP. RN notified. BP limited ther ex to ROM focus, with ROM measured at lacking 9 degrees extension to 75 degrees flexion. PT to continue in P.M.  -EJ    Row Name 05/22/20 1357 05/22/20 1050       Vital Signs    Pre Systolic BP Rehab  106  -EJ  -- WNL with previous measurement, Pt with OT when PT entered.  -EJ    Pre Treatment Diastolic BP  66  -EJ  --    Intra Systolic BP Rehab  --  89  -EJ    Intra Treatment Diastolic BP  --  49  -EJ    Post Systolic BP Rehab  101  -EJ  75  -EJ    Post Treatment Diastolic BP  58  -EJ  46  -EJ    Pre SpO2 (%)  98  -EJ  --    O2 Delivery Pre Treatment  supplemental O2  -EJ  room air  -EJ    O2 Delivery Intra Treatment  --  room air  -EJ    Post SpO2 (%)  98  -EJ  --    O2 Delivery Post Treatment  room air  -EJ  room air  -EJ    Pre Patient Position  Sitting  -EJ  Sitting  -EJ    Intra Patient Position  Standing  -EJ  Standing  -EJ    Post Patient Position  Sitting  -EJ  Sitting  -EJ    Row Name 05/22/20 1357 05/22/20 1050       Positioning and Restraints    Pre-Treatment Position  sitting in chair/recliner  -EJ  sitting in chair/recliner  -EJ    Post Treatment Position  chair  -EJ  chair  -EJ    In Chair  reclined;call light within reach;encouraged to call for assist pillow under low calf/foot to put knee into more extension, ice pack donned.  -EJ  reclined;call light within reach;encouraged to call for assist;with nsg;exit alarm on;notified nsg  -EJ      User Key  (r) =  Recorded By, (t) = Taken By, (c) = Cosigned By    Initials Name Provider Type    Miguelina Felix PT Physical Therapist        Outcome Measures     Row Name 05/22/20 1402 05/22/20 1058       How much help from another person do you currently need...    Turning from your back to your side while in flat bed without using bedrails?  3  -EJ  3  -EJ    Moving from lying on back to sitting on the side of a flat bed without bedrails?  3  -EJ  3  -EJ    Moving to and from a bed to a chair (including a wheelchair)?  3  -EJ  3  -EJ    Standing up from a chair using your arms (e.g., wheelchair, bedside chair)?  4  -EJ  3  -EJ    Climbing 3-5 steps with a railing?  3  -EJ  3  -EJ    To walk in hospital room?  3  -EJ  3  -EJ    AM-PAC 6 Clicks Score (PT)  19  -EJ  18  -EJ    Row Name 05/22/20 1402 05/22/20 1058       Functional Assessment    Outcome Measure Options  AM-PAC 6 Clicks Basic Mobility (PT)  -EJ  AM-PAC 6 Clicks Basic Mobility (PT)  -EJ      User Key  (r) = Recorded By, (t) = Taken By, (c) = Cosigned By    Initials Name Provider Type    Miguelina Felix PT Physical Therapist        Physical Therapy Education                 Title: PT OT SLP Therapies (Done)     Topic: Physical Therapy (Done)     Point: Mobility training (Done)     Description:   Instruct learner(s) on safety and technique for assisting patient out of bed, chair or wheelchair.  Instruct in the proper use of assistive devices, such as walker, crutches, cane or brace.              Patient Friendly Description:   It's important to get you on your feet again, but we need to do so in a way that is safe for you. Falling has serious consequences, and your personal safety is the most important thing of all.        When it's time to get out of bed, one of us or a family member will sit next to you on the bed to give you support.     If your doctor or nurse tells you to use a walker, crutches, a cane, or a brace, be sure you use it every time you get  out of bed, even if you think you don't need it.    Learning Progress Summary           Patient Acceptance, E,TB,H,D, VU,DU by VINH at 5/22/2020 1402    Acceptance, E,TB,H, VU,NR by VINH at 5/22/2020 1059    Acceptance, E,D, VU by ALESSANDRA at 5/21/2020 1430    Comment:  Educated on safe sequencing with bed mobility, ambulatory transfers, and gait. Reviewed HEP and WB status.                   Point: Home exercise program (Done)     Description:   Instruct learner(s) on appropriate technique for monitoring, assisting and/or progressing patient with therapeutic exercises and activities.              Learning Progress Summary           Patient Acceptance, E,TB,H,D, VU,DU by VINH at 5/22/2020 1402    Acceptance, E,TB,H, VU,NR by VINH at 5/22/2020 1059    Acceptance, E,D, VU by ALESSANDRA at 5/21/2020 1430    Comment:  Educated on safe sequencing with bed mobility, ambulatory transfers, and gait. Reviewed HEP and WB status.                   Point: Body mechanics (Done)     Description:   Instruct learner(s) on proper positioning and spine alignment for patient and/or caregiver during mobility tasks and/or exercises.              Learning Progress Summary           Patient Acceptance, E,TB,H,D, VU,DU by VINH at 5/22/2020 1402    Acceptance, E,TB,H, VU,NR by VINH at 5/22/2020 1059    Acceptance, E,D, VU by ALESSANDRA at 5/21/2020 1430    Comment:  Educated on safe sequencing with bed mobility, ambulatory transfers, and gait. Reviewed HEP and WB status.                   Point: Precautions (Done)     Description:   Instruct learner(s) on prescribed precautions during mobility and gait tasks              Learning Progress Summary           Patient Acceptance, E,TB,H,D, VU,DU by VINH at 5/22/2020 1402    Acceptance, E,TB,H, VU,NR by VINH at 5/22/2020 1059    Acceptance, E,D, VU by ALESSANDRA at 5/21/2020 1430    Comment:  Educated on safe sequencing with bed mobility, ambulatory transfers, and gait. Reviewed HEP and WB status.                               User Key      Initials Effective Dates Name Provider Type Discipline    EJ 11/20/18 -  Miguelina Segundo PT Physical Therapist PT    ALESSANDRA 09/10/19 -  Maurilio Burgess PT Physical Therapist PT              PT Recommendation and Plan     Outcome Summary/Treatment Plan (PT)  Anticipated Discharge Disposition (PT): home with assist, home with home health  Plan of Care Reviewed With: patient  Progress: no change  Outcome Summary: P ambulates 168 ft in do with CGA, RWx with progression toward SBA. Pt CGA to SBA with sit<>stand t/f with RWx. HEP performed in totality this P.M.. Pt limited by pain. Pt cleared stairs in A.M. and ROM from A.M. was 9-75 LLE Knee ROM.  Pt cleared to go from PT standpoint, with more stable BP, dependent upon pt pain level.     Time Calculation:   PT Charges     Row Name 05/22/20 1403 05/22/20 1102          Time Calculation    Start Time  1306  -EJ  0834  -EJ     PT Received On  05/22/20  -EJ  05/22/20  -EJ     PT Goal Re-Cert Due Date  05/31/20  -EJ  05/31/20  -EJ        Time Calculation- PT    Total Timed Code Minutes- PT  42 minute(s)  -EJ  55 minute(s)  -EJ        Timed Charges    93254 - PT Therapeutic Exercise Minutes  18  -EJ  15  -EJ     53615 - Gait Training Minutes   20  -EJ  40  -EJ     90134 - PT Therapeutic Activity Minutes  4  -EJ  --       User Key  (r) = Recorded By, (t) = Taken By, (c) = Cosigned By    Initials Name Provider Type    EJ Miguelina Segundo PT Physical Therapist        Therapy Charges for Today     Code Description Service Date Service Provider Modifiers Qty    82318416367 HC PT THER PROC EA 15 MIN 5/22/2020 Miguelina Segundo, PT GP 1    67346835681 HC GAIT TRAINING EA 15 MIN 5/22/2020 Miguelina Segundo, PT GP 3    69531609236 HC PT THER PROC EA 15 MIN 5/22/2020 Miguelina Segundo, PT GP 1    11297811709 HC GAIT TRAINING EA 15 MIN 5/22/2020 Miguelina Segundo, PT GP 2          PT G-Codes  Outcome Measure Options: AM-PAC 6 Clicks Basic Mobility (PT)  AM-PAC 6 Clicks Score (PT):  19    Miguelina Lopez, PT  5/22/2020

## 2020-05-22 NOTE — PROGRESS NOTES
[facility] [place]    Subjective      LOS: 0 days   Patient Care Team:  Arthur Winston MD as PCP - General (General Practice)    CC: Left knee pain    Interval History:   Patient was seen earlier today, this morning.  Patient complaining of pain after physical therapy today, with low blood pressure.  She is not sure if she can go home today because of the pain she is experiencing.    Objective      Vital Signs  Temp (24hrs), Av.9 °F (36.6 °C), Min:97.6 °F (36.4 °C), Max:98 °F (36.7 °C)      /68 (BP Location: Right arm, Patient Position: Lying)   Pulse 73   Temp 97.8 °F (36.6 °C) (Oral)   Resp 16   SpO2 91%   Breastfeeding No     Examination:   Examination of the left knee: The wound is clean, dry, and intact.  Ankle dorsiflexion, ankle plantar flexion, and EHL are intact.  Sensation intact in the foot to light touch.  2+ dorsalis pedis pulse.  Straight leg raise is intact.      Labs:  Results from last 7 days   Lab Units 20  0610 20  1342   WBC 10*3/mm3 11.29* 4.32   HEMOGLOBIN g/dL 12.1 13.6   HEMATOCRIT % 36.9 41.2   MCV fL 95.6 94.5   PLATELETS 10*3/mm3 188 194       Radiology:  Imaging Results (Last 24 Hours)     Procedure Component Value Units Date/Time    XR Knee 1 or 2 View Left [[id number]] Collected:  20 1312     Updated:  20 1353    Narrative:       EXAMINATION: XR KNEE 1 OR 2 VW LEFT- 2020     INDICATION: KNEE ARTHROPLASTY      COMPARISON: 2019 radiographs     FINDINGS: AP and lateral views of the left knee reveal status post left  total knee arthroplasty without evidence of complication in excellent  anatomic alignment. Expected postsurgical changes in the adjacent soft  tissues.       Impression:       Status post total left knee arthroplasty in excellent  anatomic alignment.     D:  2020  E:  2020                PT:  Physical Therapy - Plan of Care Review - Outcome Summary:  Outcome Summary: Pt ambulates in  do 217 ft with RWx, CGA. Pt also goes up/down 4 stairs with progression to cane +HR with home setup. Pt demonstates good step to technique. After stairs pt is assisted back to room in chair 2/2 dizziness and low BP. RN notified. BP limited ther ex to ROM focus, with ROM measured at lacking 9 degrees extension to 75 degrees flexion. PT to continue in P.M. (05/22/20 1050)]       Results Review:     I reviewed the patient's new clinical results.    Assessment and Plan     Assessment:   Status post left total knee arthroplasty    Pain control issues      S/P total knee arthroplasty, left    Primary osteoarthritis of left knee    Depression      Plan for disposition: Possible discharge home this afternoon if her pain is controlled and cleared medically and by physical therapy.  Follow-up with me in 3 weeks as planned.      Future Appointments   Date Time Provider Department Center   6/15/2020 10:00 AM Hadley Gibson MD [facility] None           Hadley Gibson MD  05/22/20  13:09

## 2020-05-22 NOTE — PLAN OF CARE
Problem: Patient Care Overview  Goal: Plan of Care Review  Flowsheets (Taken 5/22/2020 1050)  Progress: improving  Plan of Care Reviewed With: patient  Outcome Summary: Pt ambulates in do 217 ft with RWx, CGA. Pt also goes up/down 4 stairs with progression to cane +HR with home setup. Pt demonstates good step to technique. After stairs pt is assisted back to room in chair 2/2 dizziness and low BP. RN notified. BP limited ther ex to ROM focus, with ROM measured at lacking 9 degrees extension to 75 degrees flexion. PT to continue in P.M.

## 2020-05-22 NOTE — PLAN OF CARE
Problem: Patient Care Overview  Goal: Plan of Care Review  Flowsheets  Taken 5/21/2020 1833 by Chiquis Leyva, RN  Progress: improving  Taken 5/21/2020 2010 by Sanya Pinon, RN  Plan of Care Reviewed With: patient  Taken 5/22/2020 0552 by Sanya Pinon, RN  Outcome Summary: Pt has rested well during shift, has slept in chair. Reports she does  this at home at times. Did have some desats during sleep and was started on supplimental 02 via NC. Pain has been well controled by PRN medications and Arrow pump.

## 2020-05-22 NOTE — DISCHARGE INSTRUCTIONS
Take Aspirin at least an hour before ibuprofen.    You have a prineo dressing in place. This dressing is waterproof and you may shower with it in place once your nerve catheter is removed. It will remain in place for 2 weeks.     You have a nerve catheter in place to assist with pain control. Please call the phone number provided by anesthesiology for any questions, problems, or concerns regarding your nerve catheter.     [facility] will provide you with home physical therapy. They will contact you to set up your first appointment.

## 2020-05-22 NOTE — DISCHARGE SUMMARY
Patient Name: Asiya Cleaning  MRN: [id number]  : 1945  DOS: 2020    Attending: Hadley Gibson MD    Primary Care Provider: Arthur Winston MD    Date of Admission:.2020  7:29 AM    Date of Discharge:  2020    Discharge Diagnosis:     S/P total knee arthroplasty, left    Primary osteoarthritis of left knee    Depression      Hospital Course    At admit:  Patient is a pleasant 74 y.o. female presented for scheduled surgery by Dr. Gibson  Per his note (INDICATIONS: The patient is a 74 y.o. female with debilitating left knee pain secondary to osteoarthritis that failed to improve in spite of conservative treatment .  Options have been discussed at length with the patient and the patient has had an extended course of conservative treatment without long-term benefit. The patient has reached the point where the patient desires total knee arthroplasty surgery and understands the risks, benefits, and alternatives. Consent was obtained. Please see my office notes for details with regard to preoperative counseling and operative rationale. )     She underwent left total knee arthroplasty under spinal anesthesia, tolerated well.  Adductor canal nerve block catheter was placed postoperatively.     Seen in her room after surgery, doing well, had mild dizziness on sitting on the side of bed with PT but feels better now and is ready to ambulate.     Good pain control.  No fever chills, nausea or vomiting.     No history of DVT or PE.     After admit:    Patient was provided pain medications as needed for pain control, along with adductor canal nerve block infusion of Ropivacaine.    Adjustments were made to pain medications to optimize postop pain management. Risks and benefits of opiate medications discussed with patient.    She was seen by PT and OT and has progressed well over her stay.    Patient used an IS for atelectasis prophylaxis and aspirin along with mechanicals for DVT prophylaxis.  Home  medications were resumed as appropriate, and labs were monitored and remained fairly stable.     With the progress she has made, Ms. Cleaning is ready for DC home today.      She will have an Arrow pump ( instructed on it during this admit).    Discussed with patient regarding plan and she shows understanding and agreement.    Patient will have HHPT following discharge.        Procedures Performed  Procedure(s):  TOTAL KNEE ARTHROPLASTY LEFT       Pertinent Test Results:    I reviewed the patient's new clinical results.   Results from last 7 days   Lab Units 20  0610 20  1342   WBC 10*3/mm3 11.29* 4.32   HEMOGLOBIN g/dL 12.1 13.6   HEMATOCRIT % 36.9 41.2   PLATELETS 10*3/mm3 188 194     Results from last 7 days   Lab Units 20  0610 20  1342   SODIUM mmol/L 135* 144   POTASSIUM mmol/L 3.8 3.8   CHLORIDE mmol/L 100 106   CO2 mmol/L 24.0 28.0   BUN mg/dL 15 8   CREATININE mg/dL 0.82 0.75   CALCIUM mg/dL 9.3 9.4   GLUCOSE mg/dL 139* 120*     I reviewed the patient's new imaging including images and reports.      Physical therapy2020 1402 by Miguelina Segundo, PT  Flowsheets (Taken 2020 1357)  Progress: no change  Plan of Care Reviewed With: patient  Outcome Summary: P ambulates 168 ft in do with CGA, RWx with progression toward SBA. Pt CGA to SBA with sit<>stand t/f with RWx. HEP performed in totality this P.M.. Pt limited by pain. Pt cleared stairs in A.M. and ROM from A.M. was 9-75 LLE Knee ROM.  Pt cleared to go from PT standpoint, with more stable BP, dependent upon pt pain level.         Discharge Assessment:      Visit Vitals  /68 (BP Location: Right arm, Patient Position: Lying)   Pulse 73   Temp 97.8 °F (36.6 °C) (Oral)   Resp 16   SpO2 91%   Breastfeeding No     Temp (24hrs), Av.9 °F (36.6 °C), Min:97.6 °F (36.4 °C), Max:98.5 °F (36.9 °C)      General Appearance:    Alert, cooperative, in no acute distress   Lungs:     Clear to auscultation,respirations regular,  even and                   unlabored    Heart:    Regular rhythm and normal rate, normal S1 and S2   Abdomen:     Normal bowel sounds, no masses, no organomegaly, soft        non-tender, non-distended, no guarding, no rebound                 tenderness   Extremities:   CDI dressing surgical knee, perineal. ACB cath present, arrow pump.   Pulses:   Pulses palpable and equal bilaterally   Skin:   No bleeding, bruising or rash   Neurologic:   Cranial nerves 2 - 12 grossly intact, sensation intact, Flexion and dorsiflexion intact bilateral feet.         Discharge Disposition: Home          Discharge Medications      New Medications      Instructions Start Date   aspirin 325 MG EC tablet   325 mg, Oral, Daily   Start Date:  May 23, 2020     docusate sodium 100 MG capsule  Commonly known as:  Colace   100 mg, Oral, 2 Times Daily      oxyCODONE 5 MG immediate release tablet  Commonly known as:  ROXICODONE   5 mg, Oral, Every 4 Hours PRN      Ropivacine HCl-NaCl  Commonly known as:  NAROPIN   10 mL/hr (20 mg/hr), Peripheral Nerve, Continuous         Changes to Medications      Instructions Start Date   acetaminophen 325 MG tablet  Commonly known as:  TYLENOL  What changed:  Another medication with the same name was added. Make sure you understand how and when to take each.   650 mg, Oral, Every 6 Hours PRN      acetaminophen 325 MG tablet  Commonly known as:  TYLENOL  What changed:  You were already taking a medication with the same name, and this prescription was added. Make sure you understand how and when to take each.   650 mg, Oral, Every 4 Hours PRN      ibuprofen 200 MG tablet  Commonly known as:  ADVIL,MOTRIN  What changed:  reasons to take this   200 mg, Oral, Every 6 Hours PRN         Continue These Medications      Instructions Start Date   CBD oral oil  Commonly known as:  cannabidiol   1 mL, Oral, Daily      cetirizine 10 MG tablet  Commonly known as:  zyrTEC   10 mg, Oral, Daily      diphenhydrAMINE 25 mg  capsule  Commonly known as:  BENADRYL   25 mg, Oral, Nightly PRN      fluticasone 50 MCG/ACT nasal spray  Commonly known as:  FLONASE   2 sprays, Nasal, Daily      LUTEIN PO   Oral      melatonin 5 MG tablet tablet   10 mg, Oral, Nightly      montelukast 10 MG tablet  Commonly known as:  SINGULAIR   10 mg, Oral, Nightly      vitamin C 500 MG tablet  Commonly known as:  ASCORBIC ACID   1,000 mg, Oral, Daily         Stop These Medications    Antiseptic Skin Cleanser 4 % solution  Generic drug:  Chlorhexidine Gluconate     mupirocin 2 % ointment  Commonly known as:  BACTROBAN     STAHIST PO     Z-Tuss AC 2-9 MG/5ML liquid  Generic drug:  Chlorpheniramine-Codeine            Discharge Diet: Regular    Activity at Discharge: Weightbearing as tolerated left lower extremity    Follow-up Appointments  Dr. Gibson per his orders        Dragon disclaimer:  Part of this encounter note is an electronic transcription/translation of spoken language to printed text. The electronic translation of spoken language may permit erroneous, or at times, nonsensical words or phrases to be inadvertently transcribed; Although I have reviewed the note for such errors, some may still exist.       Diaz Colon MD  05/22/20  12:01

## 2020-05-22 NOTE — PLAN OF CARE
Problem: Patient Care Overview  Goal: Plan of Care Review  Outcome: Outcome(s) achieved  Goal: Individualization and Mutuality  Outcome: Outcome(s) achieved  Goal: Discharge Needs Assessment  Outcome: Outcome(s) achieved  Goal: Interprofessional Rounds/Family Conf  Outcome: Outcome(s) achieved

## 2020-05-22 NOTE — THERAPY EVALUATION
Acute Care - Occupational Therapy Initial Evaluation  [facility]     Patient Name: Asiya Cleaning  : 1945  MRN: [id number]  Today's Date: 2020             Admit Date: 2020       ICD-10-CM ICD-9-CM   1. S/P total knee arthroplasty, left Z96.652 V43.65   2. Primary osteoarthritis of left knee M17.12 715.16     Patient Active Problem List   Diagnosis   • Primary osteoarthritis of left knee   • Cough   • Seasonal allergic rhinitis due to pollen   • S/P total knee arthroplasty, left   • Depression     Past Medical History:   Diagnosis Date   • Allergic rhinitis    • Baker's cyst of knee, left    • Bronchitis    • Depression    • Hearing loss     LEFT EAR NO AIDS   • Influenza    • Macular hole     HX OF   • Osteoarthritis    • PNA (pneumonia)    • Seasonal allergies    • Wears glasses      Past Surgical History:   Procedure Laterality Date   • CATARACT EXTRACTION Left    • COLONOSCOPY      clear   • DENTAL PROCEDURE      extraction   • DENTAL PROCEDURE      IMPLANT   • TOTAL KNEE ARTHROPLASTY Left 2020    Procedure: TOTAL KNEE ARTHROPLASTY LEFT;  Surgeon: Hadley Gibson MD;  Location: [facility];  Service: Orthopedics;  Laterality: Left;   • VITRECTOMY PARS PLANA W/ REPAIR OF MACULAR HOLE            OT ASSESSMENT FLOWSHEET (last 12 hours)      Occupational Therapy Evaluation     Row Name 20 0811                   OT Evaluation Time/Intention    Subjective Information  no complaints  -JY        Document Type  evaluation  -JY        Mode of Treatment  occupational therapy  -JY        Patient Effort  good  -JY           General Information    Patient Profile Reviewed?  yes  -JY        Patient/Family Observations  no family present   -JY        Prior Level of Function  min assist:;ADL's;dressing;bathing;all household mobility;transfer;bed mobility  -JY        Equipment Currently Used at Home  bath bench;commode, 3-in-1;grab bar BSC over toilet   -JY        Existing  Precautions/Restrictions  fall;other (see comments) L adductor nerve catheter   -JY        Risks Reviewed  patient:  -JY        Benefits Reviewed  patient:  -JY        Barriers to Rehab  none identified  -JY           Relationship/Environment    Lives With  child(ayan), adult  -JY           Resource/Environmental Concerns    Current Living Arrangements  home/apartment/condo  -JY           Home Main Entrance    Number of Stairs, Main Entrance  five  -JY        Stair Railings, Main Entrance  railing on right side (ascending)  -JY           Stairs Within Home, Primary    Stairs, Within Home, Primary  0  -JY        Number of Stairs, Within Home, Primary  none  -JY           Cognitive Assessment/Intervention- PT/OT    Orientation Status (Cognition)  oriented x 3;other (see comments) disoriented to day of week   -JY        Follows Commands (Cognition)  WFL  -JY           Safety Issues, Functional Mobility    Safety Issues Affecting Function (Mobility)  insight into deficits/self awareness;safety precaution awareness;safety precautions follow-through/compliance  -JY        Impairments Affecting Function (Mobility)  pain;endurance/activity tolerance;strength;range of motion (ROM);balance  -JY        Comment, Safety Issues/Impairments (Mobility)  initial dizziness and LH in standing   -JY           Mobility Assessment/Treatment    Extremity Weight-bearing Status  left lower extremity  -JY        Left Lower Extremity (Weight-bearing Status)  weight-bearing as tolerated (WBAT)  -JY           Bed Mobility Assessment/Treatment    Comment (Bed Mobility)  UIC upon arrival   -JY           Functional Mobility    Functional Mobility- Ind. Level  contact guard assist;standby assist  -JY        Functional Mobility- Device  rolling walker  -JY        Functional Mobility-Distance (Feet)  -- in room bedroom < > bathroom   -JY        Functional Mobility-Maintain WBing  able to maintain weight bearing status  -JY        Functional Mobility-  Comment  cues for optimal wx plmt in transitional movements   -JY           Transfer Assessment/Treatment    Transfer Assessment/Treatment  sit-stand transfer;stand-sit transfer;toilet transfer  -JY        Maintains Weight-bearing Status (Transfers)  able to maintain  -JY        Comment (Transfers)  v/c for hand plmt in controlled ascend, descend, move L LE out forward prior to sitting to increase comfort   -JY           Sit-Stand Transfer    Sit-Stand [place] (Transfers)  contact guard;verbal cues  -JY        Assistive Device (Sit-Stand Transfers)  walker, front-wheeled  -JY           Stand-Sit Transfer    Stand-Sit [place] (Transfers)  contact guard;verbal cues  -JY        Assistive Device (Stand-Sit Transfers)  walker, front-wheeled  -JY           Toilet Transfer    Type (Toilet Transfer)  stand pivot/stand step  -JY        [place] Level (Toilet Transfer)  contact guard;verbal cues  -JY        Assistive Device (Toilet Transfer)  commode;walker, front-wheeled;grab bars/safety frame  -JY           ADL Assessment/Intervention    10417 - OT Self Care/Mgmt Minutes  15  -JY        BADL Assessment/Intervention  upper body dressing;lower body dressing;toileting;grooming  -JY           Upper Body Dressing Assessment/Training    Upper Body Dressing [place] Level  conditional independence  -JY        Upper Body Dressing Position  supported sitting  -JY        Comment (Upper Body Dressing)  increased time to manage around wires (situational)   -JY           Lower Body Dressing Assessment/Training    Lower Body Dressing [place] Level  doff;don;socks;conditional independence;pants/bottoms;other (see comments);verbal cues SBA  -JY        Lower Body Dressing Position  unsupported sitting;supported standing  -JY        Comment (Lower Body Dressing)  v/c for technique in threading pants in optimal seq  -JY           Grooming Assessment/Training    [place] Level (Grooming)  wash face, hands;other (see  comments) SBA  -JY        Grooming Position  sink side;supported standing  -JY           Toileting Assessment/Training    [place] Level (Toileting)  perform perineal hygiene;other (see comments);adjust/manage clothing;contact guard assist;verbal cues SBA  -JY        Assistive Devices (Toileting)  commode;raised toilet seat  -JY        Toileting Position  unsupported sitting;supported standing  -JY        Comment (Toileting)  v/c for tech, wx plmt, controlled ascend and decend   -JY           BADL Safety/Performance    Impairments, BADL Safety/Performance  balance;endurance/activity tolerance;pain;range of motion  -JY        Skilled BADL Treatment/Intervention  BADL process/adaptation training  -JY        Progress in BADL Status  improvement noted  -JY           General ROM    GENERAL ROM COMMENTS  BUEs appears grossly WFL   -JY           MMT (Manual Muscle Testing)    General MMT Comments  BUEs grosly 4 to 4+/5   -JY           Motor Assessment/Interventions    Additional Documentation  Balance (Group)  -JY           Balance    Balance  static sitting balance;static standing balance;dynamic sitting balance;dynamic standing balance  -JY           Static Sitting Balance    Level of [place] (Unsupported Sitting, Static Balance)  independent  -JY        Sitting Position (Unsupported Sitting, Static Balance)  sitting in chair  -JY        Time Able to Maintain Position (Unsupported Sitting, Static Balance)  more than 5 minutes  -JY           Dynamic Sitting Balance    Level of [place], Reaches Outside Midline (Sitting, Dynamic Balance)  supervision  -JY        Sitting Position, Reaches Outside Midline (Sitting, Dynamic Balance)  sitting in chair  -JY        Comment, Reaches Outside Midline (Sitting, Dynamic Balance)  distal reach for LBD tasks   -JY           Static Standing Balance    Level of [place] (Supported Standing, Static Balance)  standby assist  -JY        Time Able to Maintain Position  (Supported Standing, Static Balance)  1 to 2 minutes  -JY        Assistive Device Utilized (Supported Standing, Static Balance)  walker, rolling  -JY           Dynamic Standing Balance    Level of [place], Reaches Outside Midline (Standing, Dynamic Balance)  contact guard assist  -JY        Time Able to Maintain Position, Reaches Outside Midline (Standing, Dynamic Balance)  3 to 4 minutes  -JY           Sensory Assessment/Intervention    Sensory General Assessment  no sensation deficits identified  -JY           Positioning and Restraints    Pre-Treatment Position  sitting in chair/recliner  -JY        Post Treatment Position  chair  -JY        In Chair  notified nsg;reclined;sitting;call light within reach;encouraged to call for assist;waffle cushion;with PT SCDs applied, ice pack   -JY           Pain Assessment    Additional Documentation  Pain Scale: Numbers Pre/Post-Treatment (Group)  -JY           Pain Scale: Numbers Pre/Post-Treatment    Pain Scale: Numbers, Pretreatment  1/10  -JY        Pain Scale: Numbers, Post-Treatment  1/10  -JY        Pain Location - Side  Left  -JY        Pain Location - Orientation  lateral  -JY        Pain Location  knee  -JY        Pre/Post Treatment Pain Comment  tolerated   -JY        Pain Intervention(s)  Cold applied;Repositioned;Elevated;Ambulation/increased activity  -JY           Wound 05/21/20 Left anterior;midline knee Incision    Wound - Properties Group Date first assessed: 05/21/20  -TP Present on Hospital Admission: N  -TP Side: Left  -TP Orientation: anterior;midline  -TP Location: knee  -TP Primary Wound Type: Incision  -TP       Plan of Care Review    Plan of Care Reviewed With  patient  -JY        Progress  no change OT IE   -JY        Outcome Summary  OT IE completed post chart review. Pt presents w/ decreased I in ADLs and related t/f compared to PLOF. Pt UIC, demonstrated need for CGA to move sit < > stand at recliner and toilet w/ FWW, completed fxl  mobility w/ gross CGA to SBA intermittently. Pt completed toilet hygiene w/ SBA, cloth mgmt w/ CGA. Pt presents w/ need to complete shower t/f at home w/ step over tub; pt has shower chair vs bench (not appropriate for bath set up) and in trial t/f req'd gross mod A to safely complete step over. Pt demonstrated UBD and d/d socks w/ CI and gross LBD w/ SBA. Pt would benefit from further training in bath/tub transfer for safety and recommend family assist at home and HHOT to assess home environment further.   -JY           Clinical Impression (OT)    OT Diagnosis  decreased I in ADLs, related t/f   -JY        Patient/Family Goals Statement (OT Eval)  to increase I in ADL, related t/f, return to PLOF   -JY        Criteria for Skilled Therapeutic Interventions Met (OT Eval)  yes;treatment indicated  -JY        Rehab Potential (OT Eval)  good, to achieve stated therapy goals  -JY        Therapy Frequency (OT Eval)  daily  -JY        Anticipated Discharge Disposition (OT)  home with assist;home with home health  -JY           Vital Signs    Pre Systolic BP Rehab  121  -JY        Pre Treatment Diastolic BP  68  -JY        Pretreatment Heart Rate (beats/min)  76  -JY        Posttreatment Heart Rate (beats/min)  74  -JY        Pre SpO2 (%)  95  -JY        O2 Delivery Pre Treatment  room air  -JY        O2 Delivery Intra Treatment  room air  -JY        Post SpO2 (%)  94  -JY        O2 Delivery Post Treatment  room air  -JY        Pre Patient Position  Sitting  -JY        Intra Patient Position  Standing  -JY        Post Patient Position  Sitting  -JY           OT Goals    Transfer Goal Selection (OT)  transfer, OT goal 1  -JY        Dressing Goal Selection (OT)  dressing, OT goal 1  -JY        Toileting Goal Selection (OT)  toileting, OT goal 1  -JY           Transfer Goal 1 (OT)    Activity/Assistive Device (Transfer Goal 1, OT)  tub;shower chair;walker, rolling  -JY        [place] Level/Cues Needed (Transfer Goal 1,  OT)  standby assist;verbal cues required  -JY        Time Frame (Transfer Goal 1, OT)  long term goal (LTG);by discharge  -JY        Progress/Outcome (Transfer Goal 1, OT)  goal ongoing  -JY           Dressing Goal 1 (OT)    Activity/Assistive Device (Dressing Goal 1, OT)  lower body dressing  -JY        [place]/Cues Needed (Dressing Goal 1, OT)  supervision required  -JY        Time Frame (Dressing Goal 1, OT)  long term goal (LTG);by discharge  -JY        Progress/Outcome (Dressing Goal 1, OT)  goal ongoing  -JY           Toileting Goal 1 (OT)    Activity/Device (Toileting Goal 1, OT)  adjust/manage clothing;perform perineal hygiene;commode  -JY        [place] Level/Cues Needed (Toileting Goal 1, OT)  supervision required  -JY        Time Frame (Toileting Goal 1, OT)  long term goal (LTG);by discharge  -JY        Progress/Outcome (Toileting Goal 1, OT)  goal ongoing  -JY           Living Environment    Home Accessibility  stairs to enter home;tub/shower is not walk in  -JY          User Key  (r) = Recorded By, (t) = Taken By, (c) = Cosigned By    Initials Name Effective Dates    TP Ila Carrera RN 12/10/19 -     Jannet Elias OT 01/29/20 -          Occupational Therapy Education                 Title: PT OT SLP Therapies (In Progress)     Topic: Occupational Therapy (In Progress)     Point: ADL training (Done)     Description:   Instruct learner(s) on proper safety adaptation and remediation techniques during self care or transfers.   Instruct in proper use of assistive devices.              Learning Progress Summary           Patient Acceptance, E,D, VU,DU,NR by NAMAN at 5/22/2020 0811                   Point: Home exercise program (Not Started)     Description:   Instruct learner(s) on appropriate technique for monitoring, assisting and/or progressing therapeutic exercises/activities.              Learner Progress:   Not documented in this visit.          Point: Precautions (Done)      Description:   Instruct learner(s) on prescribed precautions during self-care and functional transfers.              Learning Progress Summary           Patient Acceptance, E,D, VU,DU,NR by NAMAN at 5/22/2020 0811                   Point: Body mechanics (Done)     Description:   Instruct learner(s) on proper positioning and spine alignment during self-care, functional mobility activities and/or exercises.              Learning Progress Summary           Patient Acceptance, E,D, VU,DU,NR by NAMAN at 5/22/2020 0811                               User Key     Initials Effective Dates Name Provider Type Discipline     01/29/20 -  Jannet Flynn, OT Occupational Therapist OT                  OT Recommendation and Plan  Outcome Summary/Treatment Plan (OT)  Anticipated Discharge Disposition (OT): home with assist, home with home health  Therapy Frequency (OT Eval): daily  Plan of Care Review  Plan of Care Reviewed With: patient  Plan of Care Reviewed With: patient  Outcome Summary: OT IE completed post chart review. Pt presents w/ decreased I in ADLs and related t/f compared to PLOF. Pt UIC, demonstrated need for CGA to move sit < > stand at recliner and toilet w/ FWW, completed fxl mobility w/ gross CGA to SBA intermittently. Pt completed toilet hygiene w/ SBA, cloth mgmt w/ CGA. Pt presents w/ need to complete shower t/f at home w/ step over tub; pt has shower chair vs bench (not appropriate for bath set up) and in trial t/f req'd gross mod A to safely complete step over. Pt demonstrated UBD and d/d socks w/ CI and gross LBD w/ SBA. Pt would benefit from further training in bath/tub transfer for safety and recommend family assist at home and HHOT to assess home environment further.     Outcome Measures     Row Name 05/22/20 0811             How much help from another is currently needed...    Putting on and taking off regular lower body clothing?  3  -JY      Bathing (including washing, rinsing, and drying)  2  -JY       Toileting (which includes using toilet bed pan or urinal)  3  -JY      Putting on and taking off regular upper body clothing  4  -JY      Taking care of personal grooming (such as brushing teeth)  4  -JY      Eating meals  4  -JY      AM-PAC 6 Clicks Score (OT)  20  -JY         Functional Assessment    Outcome Measure Options  AM-PAC 6 Clicks Daily Activity (OT)  -JY        User Key  (r) = Recorded By, (t) = Taken By, (c) = Cosigned By    Initials Name Provider Type    Jannet Elias OT Occupational Therapist          Time Calculation:   Time Calculation- OT     Row Name 05/22/20 1403 05/22/20 1102 05/22/20 0811       Time Calculation- OT    OT Start Time  --  --  0811  -JY    OT Received On  --  --  05/22/20  -NAMAN    OT Goal Re-Cert Due Date  --  --  06/01/20  -NAMAN       Timed Charges    75140 - Gait Training Minutes   20  -EJ  40  -EJ  --    56933 - OT Self Care/Mgmt Minutes  --  --  15  -JY      User Key  (r) = Recorded By, (t) = Taken By, (c) = Cosigned By    Initials Name Provider Type    Miguelina Felix PT Physical Therapist    Jannet Elias OT Occupational Therapist        Therapy Charges for Today     Code Description Service Date Service Provider Modifiers Qty    11686986020  OT SELF CARE/MGMT/TRAIN EA 15 MIN 5/22/2020 Jannet Flynn OT GO 1    [id number]  OT EVAL LOW COMPLEXITY 4 5/22/2020 Jannet Flynn OT GO 1               Jannet Flynn OT  5/22/2020

## 2020-05-22 NOTE — PROGRESS NOTES
[facility]    Acute pain service Inpatient Progress Note    Patient Name: Asiya Cleaning  :  1945  MRN:  [id number]        Acute Pain  Service Inpatient Progress Note:    Analgesia:Good  Pain Score:2/10  LOC: alert and awake  Side Effects:None  Catheter Site:clean, dry and dressing intact  Cath type: peripheral nerve cath(MOOG pump)  Infusion rate: 10ml/hr  Catheter Plan:Catheter to remain Insitu and Patient to be discharged home

## 2020-05-22 NOTE — PLAN OF CARE
Problem: Patient Care Overview  Goal: Plan of Care Review  Outcome: Ongoing (interventions implemented as appropriate)   OT IE completed post chart review. Pt presents w/ decreased I in ADLs and related t/f compared to PLOF. Pt UIC, demonstrated need for CGA to move sit < > stand at recliner and toilet w/ FWW, completed fxl mobility w/ gross CGA to SBA intermittently. Pt completed toilet hygiene w/ SBA, cloth mgmt w/ CGA. Pt presents w/ need to complete shower t/f at home w/ step over tub; pt has shower chair vs bench (not appropriate for bath set up) and in trial t/f req'd gross mod A to safely complete step over. Pt demonstrated UBD and d/d socks w/ CI and gross LBD w/ SBA. Pt would benefit from further training in bath/tub transfer for safety and recommend family assist at home and HHOT to assess home environment further.

## 2020-05-22 NOTE — PROGRESS NOTES
Discharge Planning Assessment  [facility]     Patient Name: Asiya Cleaning  MRN: [id number]  Today's Date: 5/22/2020    Admit Date: 5/21/2020    Discharge Needs Assessment     Row Name 05/22/20 1242       Living Environment    Lives With  child(ayan), adult    Current Living Arrangements  home/apartment/condo    Family Caregiver if Needed  child(ayan), adult    Quality of Family Relationships  helpful;involved;supportive    Able to Return to Prior Arrangements  yes       Resource/Environmental Concerns    Resource/Environmental Concerns  none    Transportation Concerns  car, none       Transition Planning    Patient/Family Anticipates Transition to  home with family    Patient/Family Anticipated Services at Transition  home health care    Transportation Anticipated  family or friend will provide       Discharge Needs Assessment    Readmission Within the Last 30 Days  no previous admission in last 30 days    Equipment Currently Used at Home  walker, rolling;crutches, axillary;bath bench Raised toilet seat    Equipment Needed After Discharge  none    Outpatient/Agency/Support Group Needs  homecare agency    Discharge Facility/Level of Care Needs  home with home health    Provided Post Acute Provider List?  Yes    Post Acute Provider List  Home Health    Delivered To  Patient    Method of Delivery  In person    Patient's Choice of Community Agency(s)  [facility]        Discharge Plan     Row Name 05/22/20 1244       Plan    Plan  Home with family assistanceand [facility]    Patient/Family in Agreement with Plan  yes    Plan Comments  Met with Ms. Clenaing at the bedside for discharge planning.  Ms. Cleaning lives with her daughter, Sherri, and son in law, in a 2 story home in [place].  Ms. Cleaning is ambulating with a rolling walker.  She denies any DME needs.  Discussed physical therapy and Ms. Cleaning requested [facility] for home PT.  Referred patient to Jeff with [facility].  No other needs  identified.  Ms. Cleaning states that her family are available to assist her at home as needed and will be transporting her home when discharged.    CM will continue to follow.      Final Discharge Disposition Code  06 - home with home health care        Destination      Coordination has not been started for this encounter.      Durable Medical Equipment      Coordination has not been started for this encounter.      Dialysis/Infusion      Coordination has not been started for this encounter.      Home Medical Care - Selection Complete      Service Provider Request Status Selected Services Address Phone Number Fax Number    [facility] Selected Home Health Services [address] [facility] [address] [phone]      Therapy      Coordination has not been started for this encounter.      Community Resources      Coordination has not been started for this encounter.        Expected Discharge Date and Time     Expected Discharge Date Expected Discharge Time    May 22, 2020         Demographic Summary     Row Name 05/22/20 1240       General Information    Admission Type  observation    Arrived From  home    Reason for Consult  discharge planning    General Information Comments  PCP:  Arthur Winston       Contact Information    Permission Granted to Share Info With      Contact Information Comments  Daughter:  Clementina Coleman,  [phone]        Functional Status     Row Name 05/22/20 1242       Functional Status    Usual Activity Tolerance  moderate    Current Activity Tolerance  -- See PT notes.       Functional Status, IADL    Medications  independent    Meal Preparation  independent    Housekeeping  independent    Laundry  independent    Shopping  independent       Mental Status    General Appearance WDL  WDL        Psychosocial    No documentation.       Abuse/Neglect    No documentation.       Legal    No documentation.       Substance Abuse    No  documentation.       Patient Forms    No documentation.           Anjali Whitaker RN

## 2020-05-24 NOTE — PROGRESS NOTES
[facility]    Nerve Cath Post Op Call    Patient Name: Asiya Cleaning  :  1945  MRN:  [id number]  Date of Discharge: 2020    Nerve Cath Post Op Call:    Catheter Plan:Patient called, No answer. Message left to call [facility] pain service for any questions or complaints  Patient/Family instructed to call ON CALL anesthesia provider for any questions or problems.  Patient Follow Up:

## 2020-05-25 NOTE — PROGRESS NOTES
[facility]    Nerve Cath Post Op Call    Patient Name: Asiya Cleaning  :  1945  MRN:  [id number]  Date of Discharge: 2020    Nerve Cath Post Op Call:    Catheter Plan:Patient/Family member report nerve catheter previously discontinued, tip intact  Patient/Family instructed to call ON CALL anesthesia provider for any questions or problems.  Patient Follow Up:  Patient provided the educational video about the nerve catheter and pain pump.  Video adequately prepared patient to manage pain pump at home.  Tylenol use: acetaminophen use  NSAID use: no NSAID use

## 2020-05-26 ENCOUNTER — TELEPHONE (OUTPATIENT)
Dept: ORTHOPEDIC SURGERY | Facility: CLINIC | Age: 75
End: 2020-05-26

## 2020-06-01 ENCOUNTER — TELEPHONE (OUTPATIENT)
Dept: ORTHOPEDIC SURGERY | Facility: CLINIC | Age: 75
End: 2020-06-01

## 2020-06-01 NOTE — TELEPHONE ENCOUNTER
PATIENT CALLED TO ASK ABOUT PHYSICAL THERAPY. SHE SAID SHE WOULD LIKE TO GO TO [facility] AFTER SHE IS DISCHARGED FROM HOME HEALTH. HER FOLLOW UP APPOINTMENT IS NOT UNTIL 6/15/20 SO SHE WAS NOT SURE IF SHE NEEDED THIS ORDER BEFORE THEN. PATIENT CAN BE REACHED [phone].

## 2020-06-02 NOTE — TELEPHONE ENCOUNTER
LVM letting pt know DOROTHY said he could put it order but she would probably not be discharged from home health before her follow up visit with DOROTHY so if that's the case pt can follow up on that with MEK at that visit. Told her to call back if she had any questions.    Ammon

## 2020-06-15 ENCOUNTER — OFFICE VISIT (OUTPATIENT)
Dept: ORTHOPEDIC SURGERY | Facility: CLINIC | Age: 75
End: 2020-06-15

## 2020-06-15 DIAGNOSIS — Z96.652 STATUS POST TOTAL KNEE REPLACEMENT, LEFT: Primary | ICD-10-CM

## 2020-06-15 DIAGNOSIS — Z47.89 ORTHOPEDIC AFTERCARE: ICD-10-CM

## 2020-06-15 PROCEDURE — 99024 POSTOP FOLLOW-UP VISIT: CPT | Performed by: ORTHOPAEDIC SURGERY

## 2020-06-15 RX ORDER — HYDROCODONE BITARTRATE AND ACETAMINOPHEN 5; 325 MG/1; MG/1
1 TABLET ORAL EVERY 8 HOURS PRN
Qty: 20 TABLET | Refills: 0 | Status: SHIPPED | OUTPATIENT
Start: 2020-06-15 | End: 2020-08-11 | Stop reason: HOSPADM

## 2020-06-15 NOTE — PROGRESS NOTES
[facility] [place] [place] [facility] [place]    Subjective     Chief Complaint   Patient presents with   • Post-op     3 weeks status post left total knee replacement, 05/21/20        HPI    It has been 3  week(s) since Ms. Cleaning's last visit. She returns to clinic today for postoperative follow-up of left knee arthroplasty. She rates her pain a 6/10 on the pain scale. Previous/current treatments: home heallth. Current symptoms: pain and swelling. The pain is worse with sleeping; ice and pain medication and/or NSAID improve the pain. Overall, she is doing better.  She is able to ambulate without external aids.    I have reviewed the following portions of the patient's history:History of Present Illness     Patient Active Problem List   Diagnosis   • Primary osteoarthritis of left knee   • Cough   • Seasonal allergic rhinitis due to pollen   • S/P total knee arthroplasty, left   • Depression     Past Medical History:   Diagnosis Date   • Allergic rhinitis    • Baker's cyst of knee, left    • Bronchitis    • Depression    • Hearing loss     LEFT EAR NO AIDS   • Influenza    • Macular hole     HX OF   • Osteoarthritis    • PNA (pneumonia)    • Seasonal allergies    • Wears glasses       Past Surgical History:   Procedure Laterality Date   • CATARACT EXTRACTION Left 2010   • COLONOSCOPY  2008    clear   • DENTAL PROCEDURE      extraction   • DENTAL PROCEDURE      IMPLANT   • TOTAL KNEE ARTHROPLASTY Left 5/21/2020    Procedure: TOTAL KNEE ARTHROPLASTY LEFT;  Surgeon: Hadley Gibson MD;  Location: [facility];  Service: Orthopedics;  Laterality: Left;   • VITRECTOMY PARS PLANA W/ REPAIR OF MACULAR HOLE  2009      Family History   Problem Relation Age of Onset   • Osteoarthritis Mother    • Pneumonia Mother    • Dementia Father      Social History     Socioeconomic History   • Marital status:      Spouse name: Not on file   • Number of children: 2   • Years of education: Not on file   • Highest education level: Not  on file   Occupational History   • Occupation: take out resturant owner   Tobacco Use   • Smoking status: Never Smoker   • Smokeless tobacco: Never Used   Substance and Sexual Activity   • Alcohol use: Never     Frequency: Never   • Drug use: Never   • Sexual activity: Defer      Current Outpatient Medications on File Prior to Visit   Medication Sig Dispense Refill   • acetaminophen (TYLENOL) 325 MG tablet Take 650 mg by mouth Every 6 (Six) Hours As Needed for Mild Pain .     • acetaminophen (TYLENOL) 325 MG tablet Take 2 tablets by mouth Every 4 (Four) Hours As Needed for Mild Pain  (over the counter).     • aspirin 325 MG EC tablet Take 1 tablet by mouth Daily. 30 tablet 0   • CBD (cannabidiol) oral oil Take 1 mL by mouth Daily.     • cetirizine (zyrTEC) 10 MG tablet Take 10 mg by mouth Daily.     • diphenhydrAMINE (BENADRYL) 25 mg capsule Take 25 mg by mouth At Night As Needed for Allergies.     • docusate sodium (Colace) 100 MG capsule Take 1 capsule by mouth 2 (Two) Times a Day. 40 capsule 0   • fluticasone (FLONASE) 50 MCG/ACT nasal spray 2 sprays into the nostril(s) as directed by provider Daily.     • ibuprofen (ADVIL,MOTRIN) 200 MG tablet Take 1 tablet by mouth Every 6 (Six) Hours As Needed for Mild Pain  (take at least an hour after aspirin).     • LUTEIN PO Take  by mouth.     • melatonin 5 MG tablet tablet Take 10 mg by mouth Every Night.     • montelukast (SINGULAIR) 10 MG tablet Take 1 tablet by mouth Every Night. 30 tablet 11   • Ropivacine HCl-NaCl (NAROPIN) 20 mg/hr by Peripheral Nerve route Continuous. Indications: Acute Pain     • vitamin C (ASCORBIC ACID) 500 MG tablet Take 1,000 mg by mouth Daily.       No current facility-administered medications on file prior to visit.       Allergies   Allergen Reactions   • Penicillins Itching   • Doxycycline Rash        Review of Systems     Objective      Physical Exam  LMP  (LMP Unknown)     There is no height or weight on file to calculate  BMI.    General:   Mental Status:  Alert   Appearance: Cooperative, in no acute distress   Build and Nutrition: Well-nourished well-developed female   Orientation: Alert and oriented to person, place and time   Posture: Normal   Gait: Mild limp on the left    Integument:   Left knee: Wound is well-healed with no signs of infection    Lower Extremities:   Left Knee:    Tenderness:  None    Effusion:  None    Swelling: None    Crepitus:  None    Range of motion:  Extension: 0°       Flexion: 90°  Instability:  No varus laxity, no valgus laxity, negative anterior drawer  Deformities:  None      Imaging/Studies  Imaging Results (Last 24 Hours)     Procedure Component Value Units Date/Time    XR Knee 3+ View With [place] Left [[id number]] Resulted:  06/15/20 1342     Updated:  06/15/20 1342    Narrative:       Left Knee Radiographs  Indication: status-post left total knee arthroplasty  Views: AP, lateral, and sunrise views of the left knee    Comparison: no change compared to prior study, 5/21/2020    Findings:   The components are well aligned, with no signs of loosening or failure.            Assessment and Plan     Asiya was seen today for post-op.    Diagnoses and all orders for this visit:    Status post total knee replacement, left  -     XR Knee 3+ View With [place] Left  -     Ambulatory Referral to Physical Therapy Evaluate and treat    Orthopedic aftercare    Other orders  -     HYDROcodone-acetaminophen (NORCO) 5-325 MG per tablet; Take 1 tablet by mouth Every 8 (Eight) Hours As Needed for Moderate Pain .        1. Status post total knee replacement, left    2. Orthopedic aftercare        I reviewed my findings with the patient today.  Her left total knee arthroplasty is functioning well, she will continue with therapy.  She prefers [facility] therapy, and a referral was provided.  I also provided a limited prescription of hydrocodone, and do not anticipate a refill.  I will see her back in 6 weeks, but  sooner for any problems.    Return in about 6 weeks (around 7/27/2020).    Medical Decision Making  Management Options : prescription/IM medicine and physical/occupational therapy  Data/Risk: radiology tests and independent visualization of imaging, lab tests, or EMG/NCV      Hadley Gibson MD  06/15/20  13:52    Dragon disclaimer:  Much of this encounter note is an electronic transcription/translation of spoken language to printed text. The electronic translation of spoken language may permit erroneous, or at times, nonsensical words or phrases to be inadvertently transcribed; Although I have reviewed the note for such errors, some may still exist.

## 2020-07-01 ENCOUNTER — TELEPHONE (OUTPATIENT)
Dept: ORTHOPEDIC SURGERY | Facility: CLINIC | Age: 75
End: 2020-07-01

## 2020-07-01 NOTE — TELEPHONE ENCOUNTER
Called patient back. She states that her therapist has been working on her stiffness and wants to give it a few more sessions before having her come in to be seen. She wanted to see if there was something different that she could take instead of the Hydrocodone. I recommended that she take the tylenol and ibuprofen alternating to see if that helps. She is reserving her hydrocodone for PT sessions. She is going to try this for the next week and if no better, she will call back for an appt.     Alyssa

## 2020-07-01 NOTE — TELEPHONE ENCOUNTER
HAS BEEN DOING PHYSICAL THERAPY FOR 4 WEEKS. KNEE IS TIGHTENED. IS THERE ANYTHING SHE CAN DO IN ADDITION TO PHYSICAL THERAPY TO RELEASE TIGHTNESS?     PATIENT IS REQUESTING AN ALTERNATIVE TO PAIN MEDICATION THAT SHE IS CURRENTLY TAKING.

## 2020-07-02 ENCOUNTER — TELEPHONE (OUTPATIENT)
Dept: ORTHOPEDIC SURGERY | Facility: CLINIC | Age: 75
End: 2020-07-02

## 2020-07-02 NOTE — TELEPHONE ENCOUNTER
STARTED SEEING 06/23,  RANGE OF MOTION IN LEFT KNEE  WAS 50 DEGREES, NOW IS UP TO 80 DEGREES AFTER STRETCHING AND EXERCISING.    [phone]  MACIEL HALE  [facility].

## 2020-07-13 ENCOUNTER — TELEPHONE (OUTPATIENT)
Dept: ORTHOPEDIC SURGERY | Facility: CLINIC | Age: 75
End: 2020-07-13

## 2020-07-13 NOTE — TELEPHONE ENCOUNTER
NANCY PAEZ AT OUTPATIENT FOR [facility] IS LEAVING AN UPDATE FOR THE PATIENTS RANGE OF MOTION: SHE IS ACTIVELY GETTING 85-88 DEGREES OF MOTION.  IN CASE YOU NEED TO SPEAK WITH NANCY HER NUMBER IS  [phone]

## 2020-07-17 ENCOUNTER — TELEPHONE (OUTPATIENT)
Dept: ORTHOPEDIC SURGERY | Facility: CLINIC | Age: 75
End: 2020-07-17

## 2020-07-17 NOTE — TELEPHONE ENCOUNTER
PATIENT IS 8 WEEKS AND 1 DAY OFF FROM TOTAL KNEE REPLACEMENT.  HER KNEE IS MEASURED STRAIGHT  BUT KNEE IS TIGHT AND CANNOT BEND WELL.  WHEN SHE GOES TO PHYSICAL THERAPY AND THEY STRETCH IT OUT IT THEN IT LOOSENS UP BUT THEN THE NEXT DAY IT STIFFENS UP AGAIN TO AND 85-87.  HER ALLOTTED PHYSICAL THERAPY IS ABOUT TO END AND SHE WANTS TO KNOW HOW SHE SHOULD PROCEED. AVAILABLE TO TALK AFTER 3 PM.

## 2020-07-20 ENCOUNTER — OFFICE VISIT (OUTPATIENT)
Dept: ORTHOPEDIC SURGERY | Facility: CLINIC | Age: 75
End: 2020-07-20

## 2020-07-20 DIAGNOSIS — Z96.652 STATUS POST TOTAL KNEE REPLACEMENT, LEFT: ICD-10-CM

## 2020-07-20 DIAGNOSIS — Z47.89 ORTHOPEDIC AFTERCARE: ICD-10-CM

## 2020-07-20 DIAGNOSIS — Z98.890 STATUS POST SURGERY: Primary | ICD-10-CM

## 2020-07-20 PROCEDURE — 99024 POSTOP FOLLOW-UP VISIT: CPT | Performed by: ORTHOPAEDIC SURGERY

## 2020-07-20 NOTE — PROGRESS NOTES
[facility] [place] [facility] Note    Subjective     Chief Complaint   Patient presents with   • Post-op Follow-up     5 week f/u, status post left total knee replacement, 05/21/20        HPI    It has been 5  week(s) since Ms. Cleaning's last visit. She returns to clinic today for postoperative follow-up of left knee arthroplasty. She rates her pain a 0/10 on the pain scale. Previous/current treatments: NSAIDS and physical therapy. Current symptoms: swelling and stiffness. The pain is worse with sitting, climbing stairs, sleeping and during PT; resting, ice and pain medication and/or NSAID improve the pain. Overall, she is doing the same.  She is complaining of stiffness.  Range of motion has been difficult.  She presents for consideration of a manipulation under anesthesia.  She has been doing aggressive physical therapy without continued improvement at this point.  They can get her just over 90 degrees, but the knee starts to stiffen up.  Extension is not a problem.    I have reviewed the following portions of the patient's history:History of Present Illness     Patient Active Problem List   Diagnosis   • Primary osteoarthritis of left knee   • Cough   • Seasonal allergic rhinitis due to pollen   • S/P total knee arthroplasty, left   • Depression   • Status post surgery     Past Medical History:   Diagnosis Date   • Allergic rhinitis    • Baker's cyst of knee, left    • Bronchitis    • Depression    • Hearing loss     LEFT EAR NO AIDS   • Influenza    • Macular hole     HX OF   • Osteoarthritis    • PNA (pneumonia)    • Seasonal allergies    • Wears glasses       Past Surgical History:   Procedure Laterality Date   • CATARACT EXTRACTION Left 2010   • COLONOSCOPY  2008    clear   • DENTAL PROCEDURE      extraction   • DENTAL PROCEDURE      IMPLANT   • TOTAL KNEE ARTHROPLASTY Left 5/21/2020    Procedure: TOTAL KNEE ARTHROPLASTY LEFT;  Surgeon: Hadley Gibson MD;  Location: [facility];  Service: Orthopedics;   Laterality: Left;   • VITRECTOMY PARS PLANA W/ REPAIR OF MACULAR HOLE  2009      Family History   Problem Relation Age of Onset   • Osteoarthritis Mother    • Pneumonia Mother    • Dementia Father      Social History     Socioeconomic History   • Marital status:      Spouse name: Not on file   • Number of children: 2   • Years of education: Not on file   • Highest education level: Not on file   Occupational History   • Occupation: take out resturant owner   Tobacco Use   • Smoking status: Never Smoker   • Smokeless tobacco: Never Used   Substance and Sexual Activity   • Alcohol use: Never     Frequency: Never   • Drug use: Never   • Sexual activity: Defer      Current Outpatient Medications on File Prior to Visit   Medication Sig Dispense Refill   • acetaminophen (TYLENOL) 325 MG tablet Take 650 mg by mouth Every 6 (Six) Hours As Needed for Mild Pain .     • acetaminophen (TYLENOL) 325 MG tablet Take 2 tablets by mouth Every 4 (Four) Hours As Needed for Mild Pain  (over the counter).     • aspirin 325 MG EC tablet Take 1 tablet by mouth Daily. 30 tablet 0   • CBD (cannabidiol) oral oil Take 1 mL by mouth Daily.     • cetirizine (zyrTEC) 10 MG tablet Take 10 mg by mouth Daily.     • diphenhydrAMINE (BENADRYL) 25 mg capsule Take 25 mg by mouth At Night As Needed for Allergies.     • docusate sodium (Colace) 100 MG capsule Take 1 capsule by mouth 2 (Two) Times a Day. 40 capsule 0   • fluticasone (FLONASE) 50 MCG/ACT nasal spray 2 sprays into the nostril(s) as directed by provider Daily.     • ibuprofen (ADVIL,MOTRIN) 200 MG tablet Take 1 tablet by mouth Every 6 (Six) Hours As Needed for Mild Pain  (take at least an hour after aspirin).     • LUTEIN PO Take  by mouth.     • melatonin 5 MG tablet tablet Take 10 mg by mouth Every Night.     • montelukast (SINGULAIR) 10 MG tablet Take 1 tablet by mouth Every Night. 30 tablet 11   • Ropivacine HCl-NaCl (NAROPIN) 20 mg/hr by Peripheral Nerve route Continuous.  Indications: Acute Pain     • vitamin C (ASCORBIC ACID) 500 MG tablet Take 1,000 mg by mouth Daily.     • HYDROcodone-acetaminophen (NORCO) 5-325 MG per tablet Take 1 tablet by mouth Every 8 (Eight) Hours As Needed for Moderate Pain . 20 tablet 0     No current facility-administered medications on file prior to visit.       Allergies   Allergen Reactions   • Penicillins Itching   • Doxycycline Rash        Review of Systems   Constitutional: Negative.  Negative for activity change, appetite change, chills, diaphoresis, fatigue, fever and unexpected weight change.   HENT: Negative.  Negative for congestion, dental problem, drooling, ear discharge, ear pain, facial swelling, hearing loss, mouth sores, nosebleeds, postnasal drip, rhinorrhea, sinus pressure, sneezing, sore throat, tinnitus, trouble swallowing and voice change.    Eyes: Negative.  Negative for photophobia, pain, discharge, redness, itching and visual disturbance.   Respiratory: Negative.  Negative for apnea, cough, choking, chest tightness, shortness of breath, wheezing and stridor.    Cardiovascular: Negative.  Negative for chest pain, palpitations and leg swelling.   Gastrointestinal: Negative.  Negative for abdominal distention, abdominal pain, anal bleeding, blood in stool, constipation, diarrhea, nausea, rectal pain and vomiting.   Endocrine: Negative.  Negative for cold intolerance, heat intolerance, polydipsia, polyphagia and polyuria.   Genitourinary: Negative.  Negative for decreased urine volume, difficulty urinating, dysuria, enuresis, flank pain, frequency, genital sores, hematuria and urgency.   Musculoskeletal: Positive for arthralgias. Negative for back pain, gait problem, joint swelling, myalgias, neck pain and neck stiffness.   Skin: Negative.  Negative for color change, pallor, rash and wound.   Allergic/Immunologic: Negative.  Negative for environmental allergies, food allergies and immunocompromised state.   Neurological: Negative.   Negative for dizziness, tremors, seizures, syncope, facial asymmetry, speech difficulty, weakness, light-headedness, numbness and headaches.   Hematological: Negative.  Negative for adenopathy. Does not bruise/bleed easily.   Psychiatric/Behavioral: Negative.  Negative for agitation, behavioral problems, confusion, decreased concentration, dysphoric mood, hallucinations, self-injury, sleep disturbance and suicidal ideas. The patient is not nervous/anxious and is not hyperactive.         Objective      Physical Exam  LMP  (LMP Unknown)     There is no height or weight on file to calculate BMI.    General:   Mental Status:  Alert   Appearance: Cooperative, in no acute distress   Build and Nutrition: Well-nourished well-developed female   Orientation: Alert and oriented to person, place and time   Posture: Normal   Gait: Normal    Integument:              Left knee: Wound is well-healed with no signs of infection     Lower Extremities:              Left Knee:                          Tenderness:    None                          Effusion:          None                          Swelling:          None                          Crepitus:          None                          Range of motion:        Extension:       0°                                                              Flexion:           90°  Instability:        No varus laxity, no valgus laxity, negative anterior drawer  Deformities:     None    Imaging/Studies  Imaging Results (Last 24 Hours)     Procedure Component Value Units Date/Time    XR Knee 3+ View With [place] Left [[id number]] Resulted:  07/20/20 1501     Updated:  07/20/20 1502    Narrative:       Left Knee Radiographs  Indication: status-post left total knee arthroplasty  Views: AP, lateral, and sunrise views of the left knee    Comparison: no change compared to prior study, 6/15/2020    Findings:   The components are well aligned, with no signs of loosening or failure.            Assessment and Plan      Asiya was seen today for post-op follow-up.    Diagnoses and all orders for this visit:    Status post surgery  -     XR Knee 3+ View With [place] Left    Status post total knee replacement, left  -     Case Request; Standing  -     Case Request    Orthopedic aftercare    Other orders  -     Follow Anesthesia Guidelines / Standing Orders; Future  -     Obtain informed consent  -     Provide instructions to patient regarding NPO status  -     Provide Patient With ERAS Hydration Instructions  -     Provide Patient With Enhanced Recovery Booklet(s) or Handout  -     Provide Instructions/Handout For Benzoyl Peroxide 5% Wash If Having Shoulder/Arm Surgery (If Prescribed)  -     Provide Instructions/Handout For Bactroban And Chlorhexidine Shower (If Prescribed)  -     Provide Patient With Carbo Loading Instructions  -     Provide Patient With ERAS Booklet(s)/Handout        1. Status post surgery    2. Status post total knee replacement, left    3. Orthopedic aftercare        I reviewed my findings with the patient today.  She has had difficulty with stiffness, particularly with knee flexion, and I can get her to 80 degrees today.  I think she would benefit from a manipulation under anesthesia, and we will go ahead and schedule that.  I discussed the risk, benefits, and alternatives.  She understands the need for daily physical therapy following the manipulation.  We will schedule at a mutually convenient time in the near future.    Surgical Counseling     After examining the patient and reviewing the diagnostic studies, I discussed the operative and non-operative approaches for the condition. The patient wishes to proceed with a manipulation under anesthesia knowing the risks, benefits, and alternatives to the surgical procedure. Risks discussed included, but are not limited to: bleeding, infection, damage to blood vessels and nerves, incomplete pain relief or chronic pain, need for further surgery, fracture,  recurrent stiffness, and anesthetic complications. The patient understands, consents, and questions were answered.      Return in about 2 weeks (around 8/3/2020) for After manipulation under anesthesia.    Medical Decision Making  Management Options : major surgery with risk factors  Data/Risk: radiology tests and independent visualization of imaging, lab tests, or EMG/NCV      Hadley Gibson MD  07/20/20  15:25    Dragon disclaimer:  Much of this encounter note is an electronic transcription/translation of spoken language to printed text. The electronic translation of spoken language may permit erroneous, or at times, nonsensical words or phrases to be inadvertently transcribed; Although I have reviewed the note for such errors, some may still exist.

## 2020-07-22 PROBLEM — Z96.652 STATUS POST TOTAL KNEE REPLACEMENT, LEFT: Status: ACTIVE | Noted: 2020-07-22

## 2020-07-27 ENCOUNTER — TELEPHONE (OUTPATIENT)
Dept: ORTHOPEDIC SURGERY | Facility: CLINIC | Age: 75
End: 2020-07-27

## 2020-07-27 NOTE — TELEPHONE ENCOUNTER
NEEDS TO HAVE PHYSICAL THERAPY SET UP FOR HOME HEALTH AFTER SHE GETS HER KNEE MANIPULATED ON THE 7TH OF AUGUST.[facility] HAS TO RELEASE HER SO THAT SHE CAN HAVE PHYSICAL THERAPY ON THAT Friday, Saturday AND Sunday WHEN [facility] IS CLOSED.  THEY WILL RELEASE HER TODAY.

## 2020-07-28 NOTE — TELEPHONE ENCOUNTER
Called patient and let her know that Dr Gibson wants to move her surgery to a Tuesday so that she can keep going to OPPT> plan is for surgery on 08/11/2020 unless something changes. Jacqueline will follow up with her to get things finalized. Patient is going to call [facility] to get PT set up for the days following surgery.    Alyssa

## 2020-08-04 ENCOUNTER — APPOINTMENT (OUTPATIENT)
Dept: PREADMISSION TESTING | Facility: HOSPITAL | Age: 75
End: 2020-08-04

## 2020-08-09 ENCOUNTER — APPOINTMENT (OUTPATIENT)
Dept: PREADMISSION TESTING | Facility: HOSPITAL | Age: 75
End: 2020-08-09

## 2020-08-09 PROCEDURE — U0004 COV-19 TEST NON-CDC HGH THRU: HCPCS

## 2020-08-09 PROCEDURE — C9803 HOPD COVID-19 SPEC COLLECT: HCPCS

## 2020-08-09 PROCEDURE — U0002 COVID-19 LAB TEST NON-CDC: HCPCS

## 2020-08-10 ENCOUNTER — ANESTHESIA EVENT (OUTPATIENT)
Dept: PERIOP | Facility: HOSPITAL | Age: 75
End: 2020-08-10

## 2020-08-10 LAB
REF LAB TEST METHOD: NORMAL
SARS-COV-2 RNA RESP QL NAA+PROBE: NOT DETECTED

## 2020-08-11 ENCOUNTER — HOSPITAL ENCOUNTER (OUTPATIENT)
Facility: HOSPITAL | Age: 75
Discharge: HOME | End: 2020-08-11
Attending: ORTHOPAEDIC SURGERY | Admitting: ORTHOPAEDIC SURGERY

## 2020-08-11 ENCOUNTER — TELEPHONE (OUTPATIENT)
Dept: ORTHOPEDIC SURGERY | Facility: CLINIC | Age: 75
End: 2020-08-11

## 2020-08-11 ENCOUNTER — ANESTHESIA (OUTPATIENT)
Dept: PERIOP | Facility: HOSPITAL | Age: 75
End: 2020-08-11

## 2020-08-11 VITALS
OXYGEN SATURATION: 97 % | RESPIRATION RATE: 18 BRPM | BODY MASS INDEX: 27.64 KG/M2 | HEIGHT: 66 IN | DIASTOLIC BLOOD PRESSURE: 77 MMHG | TEMPERATURE: 97.7 F | HEART RATE: 76 BPM | WEIGHT: 172 LBS | SYSTOLIC BLOOD PRESSURE: 137 MMHG

## 2020-08-11 DIAGNOSIS — Z96.652 STATUS POST TOTAL KNEE REPLACEMENT, LEFT: ICD-10-CM

## 2020-08-11 PROCEDURE — 27570 FIXATION OF KNEE JOINT: CPT | Performed by: ORTHOPAEDIC SURGERY

## 2020-08-11 PROCEDURE — 25010000002 ONDANSETRON PER 1 MG: Performed by: NURSE ANESTHETIST, CERTIFIED REGISTERED

## 2020-08-11 PROCEDURE — 93005 ELECTROCARDIOGRAM TRACING: CPT | Performed by: ANESTHESIOLOGY

## 2020-08-11 PROCEDURE — 25010000002 DEXAMETHASONE PER 1 MG: Performed by: NURSE ANESTHETIST, CERTIFIED REGISTERED

## 2020-08-11 PROCEDURE — 93010 ELECTROCARDIOGRAM REPORT: CPT | Performed by: INTERNAL MEDICINE

## 2020-08-11 PROCEDURE — 25010000002 FENTANYL CITRATE (PF) 100 MCG/2ML SOLUTION: Performed by: NURSE ANESTHETIST, CERTIFIED REGISTERED

## 2020-08-11 PROCEDURE — 63710000001 FAMOTIDINE 20 MG TABLET: Performed by: ANESTHESIOLOGY

## 2020-08-11 PROCEDURE — 25010000002 PROPOFOL 10 MG/ML EMULSION: Performed by: NURSE ANESTHETIST, CERTIFIED REGISTERED

## 2020-08-11 PROCEDURE — 25010000002 DEXAMETHASONE SODIUM PHOSPHATE 10 MG/ML SOLUTION: Performed by: ANESTHESIOLOGY

## 2020-08-11 PROCEDURE — 63710000001 MUPIROCIN 2 % OINTMENT 1 G TUBE: Performed by: ORTHOPAEDIC SURGERY

## 2020-08-11 PROCEDURE — A9270 NON-COVERED ITEM OR SERVICE: HCPCS | Performed by: ORTHOPAEDIC SURGERY

## 2020-08-11 PROCEDURE — A9270 NON-COVERED ITEM OR SERVICE: HCPCS | Performed by: ANESTHESIOLOGY

## 2020-08-11 RX ORDER — DEXAMETHASONE SODIUM PHOSPHATE 4 MG/ML
INJECTION, SOLUTION INTRA-ARTICULAR; INTRALESIONAL; INTRAMUSCULAR; INTRAVENOUS; SOFT TISSUE AS NEEDED
Status: DISCONTINUED | OUTPATIENT
Start: 2020-08-11 | End: 2020-08-11 | Stop reason: SURG

## 2020-08-11 RX ORDER — FAMOTIDINE 20 MG/1
20 TABLET, FILM COATED ORAL ONCE
Status: COMPLETED | OUTPATIENT
Start: 2020-08-11 | End: 2020-08-11

## 2020-08-11 RX ORDER — HYDRALAZINE HYDROCHLORIDE 20 MG/ML
5 INJECTION INTRAMUSCULAR; INTRAVENOUS
Status: DISCONTINUED | OUTPATIENT
Start: 2020-08-11 | End: 2020-08-11 | Stop reason: HOSPADM

## 2020-08-11 RX ORDER — FENTANYL CITRATE 50 UG/ML
INJECTION, SOLUTION INTRAMUSCULAR; INTRAVENOUS AS NEEDED
Status: DISCONTINUED | OUTPATIENT
Start: 2020-08-11 | End: 2020-08-11 | Stop reason: SURG

## 2020-08-11 RX ORDER — PROMETHAZINE HYDROCHLORIDE 25 MG/1
25 SUPPOSITORY RECTAL ONCE AS NEEDED
Status: DISCONTINUED | OUTPATIENT
Start: 2020-08-11 | End: 2020-08-11 | Stop reason: HOSPADM

## 2020-08-11 RX ORDER — ONDANSETRON 2 MG/ML
INJECTION INTRAMUSCULAR; INTRAVENOUS AS NEEDED
Status: DISCONTINUED | OUTPATIENT
Start: 2020-08-11 | End: 2020-08-11 | Stop reason: SURG

## 2020-08-11 RX ORDER — NALOXONE HCL 0.4 MG/ML
0.4 VIAL (ML) INJECTION AS NEEDED
Status: DISCONTINUED | OUTPATIENT
Start: 2020-08-11 | End: 2020-08-11 | Stop reason: HOSPADM

## 2020-08-11 RX ORDER — SODIUM CHLORIDE 0.9 % (FLUSH) 0.9 %
3-10 SYRINGE (ML) INJECTION AS NEEDED
Status: DISCONTINUED | OUTPATIENT
Start: 2020-08-11 | End: 2020-08-11 | Stop reason: HOSPADM

## 2020-08-11 RX ORDER — PROMETHAZINE HYDROCHLORIDE 25 MG/ML
6.25 INJECTION, SOLUTION INTRAMUSCULAR; INTRAVENOUS ONCE AS NEEDED
Status: DISCONTINUED | OUTPATIENT
Start: 2020-08-11 | End: 2020-08-11 | Stop reason: HOSPADM

## 2020-08-11 RX ORDER — SODIUM CHLORIDE 0.9 % (FLUSH) 0.9 %
3 SYRINGE (ML) INJECTION EVERY 12 HOURS SCHEDULED
Status: DISCONTINUED | OUTPATIENT
Start: 2020-08-11 | End: 2020-08-11 | Stop reason: HOSPADM

## 2020-08-11 RX ORDER — SODIUM CHLORIDE 0.9 % (FLUSH) 0.9 %
10 SYRINGE (ML) INJECTION EVERY 12 HOURS SCHEDULED
Status: DISCONTINUED | OUTPATIENT
Start: 2020-08-11 | End: 2020-08-11 | Stop reason: HOSPADM

## 2020-08-11 RX ORDER — HYDROCODONE BITARTRATE AND ACETAMINOPHEN 5; 325 MG/1; MG/1
1 TABLET ORAL ONCE AS NEEDED
Status: DISCONTINUED | OUTPATIENT
Start: 2020-08-11 | End: 2020-08-11 | Stop reason: HOSPADM

## 2020-08-11 RX ORDER — DEXAMETHASONE SODIUM PHOSPHATE 10 MG/ML
INJECTION, SOLUTION INTRAMUSCULAR; INTRAVENOUS
Status: DISCONTINUED | OUTPATIENT
Start: 2020-08-11 | End: 2020-08-11 | Stop reason: SURG

## 2020-08-11 RX ORDER — OXYCODONE HYDROCHLORIDE 5 MG/1
5 TABLET ORAL EVERY 4 HOURS PRN
Qty: 25 TABLET | Refills: 0 | Status: SHIPPED | OUTPATIENT
Start: 2020-08-11 | End: 2021-05-24

## 2020-08-11 RX ORDER — IPRATROPIUM BROMIDE AND ALBUTEROL SULFATE 2.5; .5 MG/3ML; MG/3ML
3 SOLUTION RESPIRATORY (INHALATION) ONCE AS NEEDED
Status: DISCONTINUED | OUTPATIENT
Start: 2020-08-11 | End: 2020-08-11 | Stop reason: HOSPADM

## 2020-08-11 RX ORDER — IBUPROFEN 200 MG
600 TABLET ORAL
Start: 2020-08-11

## 2020-08-11 RX ORDER — HYDROMORPHONE HYDROCHLORIDE 1 MG/ML
0.5 INJECTION, SOLUTION INTRAMUSCULAR; INTRAVENOUS; SUBCUTANEOUS
Status: DISCONTINUED | OUTPATIENT
Start: 2020-08-11 | End: 2020-08-11 | Stop reason: HOSPADM

## 2020-08-11 RX ORDER — LIDOCAINE HYDROCHLORIDE 10 MG/ML
INJECTION, SOLUTION EPIDURAL; INFILTRATION; INTRACAUDAL; PERINEURAL AS NEEDED
Status: DISCONTINUED | OUTPATIENT
Start: 2020-08-11 | End: 2020-08-11 | Stop reason: SURG

## 2020-08-11 RX ORDER — FAMOTIDINE 10 MG/ML
20 INJECTION, SOLUTION INTRAVENOUS ONCE
Status: DISCONTINUED | OUTPATIENT
Start: 2020-08-11 | End: 2020-08-11 | Stop reason: HOSPADM

## 2020-08-11 RX ORDER — ONDANSETRON 2 MG/ML
4 INJECTION INTRAMUSCULAR; INTRAVENOUS ONCE AS NEEDED
Status: DISCONTINUED | OUTPATIENT
Start: 2020-08-11 | End: 2020-08-11 | Stop reason: HOSPADM

## 2020-08-11 RX ORDER — PROPOFOL 10 MG/ML
VIAL (ML) INTRAVENOUS AS NEEDED
Status: DISCONTINUED | OUTPATIENT
Start: 2020-08-11 | End: 2020-08-11 | Stop reason: SURG

## 2020-08-11 RX ORDER — LIDOCAINE HYDROCHLORIDE 10 MG/ML
0.5 INJECTION, SOLUTION EPIDURAL; INFILTRATION; INTRACAUDAL; PERINEURAL ONCE AS NEEDED
Status: COMPLETED | OUTPATIENT
Start: 2020-08-11 | End: 2020-08-11

## 2020-08-11 RX ORDER — BUPIVACAINE HYDROCHLORIDE 2.5 MG/ML
INJECTION, SOLUTION EPIDURAL; INFILTRATION; INTRACAUDAL
Status: DISCONTINUED | OUTPATIENT
Start: 2020-08-11 | End: 2020-08-11 | Stop reason: SURG

## 2020-08-11 RX ORDER — FENTANYL CITRATE 50 UG/ML
50 INJECTION, SOLUTION INTRAMUSCULAR; INTRAVENOUS
Status: COMPLETED | OUTPATIENT
Start: 2020-08-11 | End: 2020-08-11

## 2020-08-11 RX ORDER — MEPERIDINE HYDROCHLORIDE 25 MG/ML
12.5 INJECTION INTRAMUSCULAR; INTRAVENOUS; SUBCUTANEOUS
Status: DISCONTINUED | OUTPATIENT
Start: 2020-08-11 | End: 2020-08-11 | Stop reason: HOSPADM

## 2020-08-11 RX ORDER — SODIUM CHLORIDE 0.9 % (FLUSH) 0.9 %
10 SYRINGE (ML) INJECTION AS NEEDED
Status: DISCONTINUED | OUTPATIENT
Start: 2020-08-11 | End: 2020-08-11 | Stop reason: HOSPADM

## 2020-08-11 RX ORDER — PROMETHAZINE HYDROCHLORIDE 25 MG/1
25 TABLET ORAL ONCE AS NEEDED
Status: DISCONTINUED | OUTPATIENT
Start: 2020-08-11 | End: 2020-08-11 | Stop reason: HOSPADM

## 2020-08-11 RX ORDER — SODIUM CHLORIDE, SODIUM LACTATE, POTASSIUM CHLORIDE, CALCIUM CHLORIDE 600; 310; 30; 20 MG/100ML; MG/100ML; MG/100ML; MG/100ML
9 INJECTION, SOLUTION INTRAVENOUS CONTINUOUS
Status: DISCONTINUED | OUTPATIENT
Start: 2020-08-11 | End: 2020-08-11 | Stop reason: HOSPADM

## 2020-08-11 RX ORDER — LABETALOL HYDROCHLORIDE 5 MG/ML
5 INJECTION, SOLUTION INTRAVENOUS
Status: DISCONTINUED | OUTPATIENT
Start: 2020-08-11 | End: 2020-08-11 | Stop reason: HOSPADM

## 2020-08-11 RX ADMIN — MUPIROCIN 1 APPLICATION: 20 OINTMENT TOPICAL at 07:16

## 2020-08-11 RX ADMIN — FENTANYL CITRATE 50 MCG: 0.05 INJECTION, SOLUTION INTRAMUSCULAR; INTRAVENOUS at 08:07

## 2020-08-11 RX ADMIN — LIDOCAINE HYDROCHLORIDE 0.2 ML: 10 INJECTION, SOLUTION EPIDURAL; INFILTRATION; INTRACAUDAL; PERINEURAL at 07:15

## 2020-08-11 RX ADMIN — ONDANSETRON 4 MG: 2 INJECTION INTRAMUSCULAR; INTRAVENOUS at 07:44

## 2020-08-11 RX ADMIN — DEXAMETHASONE SODIUM PHOSPHATE 8 MG: 4 INJECTION, SOLUTION INTRAMUSCULAR; INTRAVENOUS at 07:37

## 2020-08-11 RX ADMIN — FENTANYL CITRATE 50 MCG: 0.05 INJECTION, SOLUTION INTRAMUSCULAR; INTRAVENOUS at 08:34

## 2020-08-11 RX ADMIN — FENTANYL CITRATE 25 MCG: 50 INJECTION, SOLUTION INTRAMUSCULAR; INTRAVENOUS at 07:45

## 2020-08-11 RX ADMIN — LIDOCAINE HYDROCHLORIDE 50 MG: 10 INJECTION, SOLUTION EPIDURAL; INFILTRATION; INTRACAUDAL; PERINEURAL at 07:37

## 2020-08-11 RX ADMIN — FENTANYL CITRATE 50 MCG: 50 INJECTION, SOLUTION INTRAMUSCULAR; INTRAVENOUS at 07:37

## 2020-08-11 RX ADMIN — FENTANYL CITRATE 50 MCG: 0.05 INJECTION, SOLUTION INTRAMUSCULAR; INTRAVENOUS at 08:16

## 2020-08-11 RX ADMIN — SODIUM CHLORIDE, POTASSIUM CHLORIDE, SODIUM LACTATE AND CALCIUM CHLORIDE 9 ML/HR: 600; 310; 30; 20 INJECTION, SOLUTION INTRAVENOUS at 07:21

## 2020-08-11 RX ADMIN — FAMOTIDINE 20 MG: 20 TABLET, FILM COATED ORAL at 07:16

## 2020-08-11 RX ADMIN — PROPOFOL 160 MG: 10 INJECTION, EMULSION INTRAVENOUS at 07:37

## 2020-08-11 RX ADMIN — DEXAMETHASONE SODIUM PHOSPHATE 2 MG: 10 INJECTION INTRAMUSCULAR; INTRAVENOUS at 08:15

## 2020-08-11 RX ADMIN — FENTANYL CITRATE 50 MCG: 0.05 INJECTION, SOLUTION INTRAMUSCULAR; INTRAVENOUS at 08:47

## 2020-08-11 RX ADMIN — FENTANYL CITRATE 25 MCG: 50 INJECTION, SOLUTION INTRAMUSCULAR; INTRAVENOUS at 07:42

## 2020-08-11 RX ADMIN — BUPIVACAINE HYDROCHLORIDE 30 ML: 2.5 INJECTION, SOLUTION EPIDURAL; INFILTRATION; INTRACAUDAL; PERINEURAL at 08:15

## 2020-08-11 NOTE — ANESTHESIA POSTPROCEDURE EVALUATION
Patient: Asiya Cleaning    Procedure Summary     Date:  08/11/20 Room / Location:   [facility] /  [facility]    Anesthesia Start:  0733 Anesthesia Stop:  0756    Procedure:  KNEE MANIPULATION LEFT (Left Knee) Diagnosis:       Status post total knee replacement, left      (Status post total knee replacement, left [Z96.652])    Surgeon:  Hadley Gibson MD Provider:  Dustin Walker MD    Anesthesia Type:  general ASA Status:  2          Anesthesia Type: general    Vitals  No vitals data found for the desired time range.          Post Anesthesia Care and Evaluation    Patient location during evaluation: PACU  Patient participation: complete - patient participated  Level of consciousness: awake  Pain score: 0  Pain management: adequate  Airway patency: patent  Anesthetic complications: No anesthetic complications  PONV Status: none  Cardiovascular status: hemodynamically stable and acceptable  Respiratory status: nonlabored ventilation, acceptable and nasal cannula  Hydration status: acceptable

## 2020-08-11 NOTE — OP NOTE
Orthopaedics Operative Report    PREOPERATIVE DIAGNOSIS: Arthrofibrosis status post left total knee arthroplasty    POSTOPERATIVE DIAGNOSIS: Arthrofibrosis status post left total knee arthroplasty    PROCEDURE PERFORMED: Manipulation under anesthesia, left knee    SURGEON: Hadley Gibson MD    ANESTHESIA:  General with Block    STAFF:  Circulator: Maurilio Jimenez RN; Manjula Dowling RN; Anne Young    ESTIMATED BLOOD LOSS: none     TOURNIQUET TIME: None     COMPLICATIONS: None    PREOPERATIVE ANTIBIOTICS: None    INDICATIONS: The patient is a 75 y.o. female status post left total knee arthroplasty on 5/21/2020, with postoperative stiffness.  She has 0 to 90 degrees of motion, but a firm stop at 90 degrees, and inability to improve with aggressive physical therapy.  Options have been discussed at length with the patient, with a decision for manipulation under anesthesia, knowing the risks, benefits, and alternatives to the procedure.  Risks discussed included, but are not limited to: Bleeding, infection, damage to blood vessels and nerves, need for further surgery, current stiffness, fracture, deep venous thrombosis, pulmonary embolus, death, and anesthetic complications.  Consent was obtained and questions were answered.  We plan for manipulation under anesthesia of her left total knee arthroplasty.    DESCRIPTION OF PROCEDURE: The patient was positively identified in the preoperative holding area and brought to the operating suite and placed into the supine position. After adequate general anesthetic had been achieved, timeout procedure was performed to confirm the operative site.  Range of motion of the knee was then performed, and she did not have any difficulty with extension, however flexion was a firm stop at 80 degrees, but with progressive manipulation of the knee, it easily went to 140 degrees, with adhesions being felt to give way during the process.  Intraoperative photographic documentation  was performed of the range of motion she was able to achieve.  There were no concerns of intraoperative fracture.    The patient tolerated the procedure well and was brought to the recovery room in good condition.    POSTOPERATIVE PLAN:  Weightbearing as tolerated on the left lower extremity, with daily physical therapy, focusing on range of motion.  Follow-up with me in 2 weeks.    Hadley Gibson MD  08/11/20  07:57    Dragon disclaimer:  Much of this encounter note is an electronic transcription/translation of spoken language to printed text. The electronic translation of spoken language may permit erroneous, or at times, nonsensical words or phrases to be inadvertently transcribed; Although I have reviewed the note for such errors, some may still exist.

## 2020-08-11 NOTE — INTERVAL H&P NOTE
Pre-Op H&P (See Recent Office Note Attached for Full H&P)    Chief complaint: left knee stiffness, status post left knee replacement    HPI:      Patient is a 75 y.o. female who presents with left knee stiffness    Review of Systems:  General ROS:  no fever, chills, rashes.  No change since last office visit.  No recent sick exposure  Cardiovascular ROS: no chest pain or dyspnea on exertion  Respiratory ROS: no cough, shortness of breath, or wheezing    Immunization History:   Influenza:  no  Pneumococcal:  no  Tetanus:  no    Meds:    No current facility-administered medications on file prior to encounter.      Current Outpatient Medications on File Prior to Encounter   Medication Sig Dispense Refill   • acetaminophen (TYLENOL) 325 MG tablet Take 650 mg by mouth Every 6 (Six) Hours As Needed for Mild Pain .     • acetaminophen (TYLENOL) 325 MG tablet Take 2 tablets by mouth Every 4 (Four) Hours As Needed for Mild Pain  (over the counter).     • cetirizine (zyrTEC) 10 MG tablet Take 10 mg by mouth Daily.     • diphenhydrAMINE (BENADRYL) 25 mg capsule Take 25 mg by mouth At Night As Needed for Allergies.     • docusate sodium (Colace) 100 MG capsule Take 1 capsule by mouth 2 (Two) Times a Day. 40 capsule 0   • fluticasone (FLONASE) 50 MCG/ACT nasal spray 2 sprays into the nostril(s) as directed by provider Daily.     • ibuprofen (ADVIL,MOTRIN) 200 MG tablet Take 1 tablet by mouth Every 6 (Six) Hours As Needed for Mild Pain  (take at least an hour after aspirin).     • melatonin 5 MG tablet tablet Take 10 mg by mouth Every Night.     • Multiple Vitamins-Minerals (MULTIVITAMIN ADULT PO) Take  by mouth.     • vitamin C (ASCORBIC ACID) 500 MG tablet Take 1,000 mg by mouth Daily.     • aspirin 325 MG EC tablet Take 1 tablet by mouth Daily. 30 tablet 0   • CBD (cannabidiol) oral oil Take 1 mL by mouth Daily.     • HYDROcodone-acetaminophen (NORCO) 5-325 MG per tablet Take 1 tablet by mouth Every 8 (Eight) Hours As Needed  "for Moderate Pain . 20 tablet 0   • LUTEIN PO Take  by mouth.     • montelukast (SINGULAIR) 10 MG tablet Take 1 tablet by mouth Every Night. 30 tablet 11   • Ropivacine HCl-NaCl (NAROPIN) 20 mg/hr by Peripheral Nerve route Continuous. Indications: Acute Pain         Vital Signs:  /89 (BP Location: Right arm, Patient Position: Sitting)   Pulse 80   Temp 97.3 °F (36.3 °C) (Temporal)   Resp 16   Ht 167.6 cm (66\")   Wt 78 kg (172 lb)   LMP  (LMP Unknown)   SpO2 96%   BMI 27.76 kg/m²     Physical Exam:    CV:  S1S2 regular rate and rhythm, no murmur               Resp:  Clear to auscultation; respirations regular, even and unlabored    Results Review:     Lab Results   Component Value Date    WBC 11.29 (H) 05/22/2020    HGB 12.1 05/22/2020    HCT 36.9 05/22/2020    MCV 95.6 05/22/2020     05/22/2020    NEUTROABS 2.24 05/18/2020    GLUCOSE 139 (H) 05/22/2020    BUN 15 05/22/2020    CREATININE 0.82 05/22/2020    EGFRIFNONA 69 05/22/2020     (L) 05/22/2020    K 3.8 05/22/2020     05/22/2020    CO2 24.0 05/22/2020    CALCIUM 9.3 05/22/2020    PTT 34.1 05/18/2020        I reviewed the patient's new clinical results. Covid test - negative    Cancer Staging (if applicable)  Cancer Patient: __ yes __no __unknown; If yes, clinical stage T:__ N:__M:__, stage group or __N/A    Assessment/Plan: 75 year old female presenting with left knee stiffness approximately 6 weeks following a total left knee arthroplasty      LOUIE Herrera  8/11/2020   06:56    Agree with above - plan for JONO left TKA    Hadley Gibson MD  08/11/20  07:29      "

## 2020-08-11 NOTE — ANESTHESIA PROCEDURE NOTES
Airway  Urgency: elective    Date/Time: 8/11/2020 7:39 AM  Airway not difficult    General Information and Staff    Patient location during procedure: OR  CRNA: Loco Hawley CRNA    Indications and Patient Condition  Indications for airway management: airway protection    Preoxygenated: yes  Mask difficulty assessment: 0 - not attempted    Final Airway Details  Final airway type: supraglottic airway      Successful airway: I-gel  Size 3    Number of attempts at approach: 1  Assessment: lips, teeth, and gum same as pre-op and atraumatic intubation    Additional Comments  LMA placed without difficulty, ventilation with assist, equal breath sounds and symmetric chest rise and fall

## 2020-08-11 NOTE — PLAN OF CARE
Problem: Patient Care Overview  Goal: Plan of Care Review  Flowsheets (Taken 8/11/2020 1008)  Progress: improving  Plan of Care Reviewed With: patient  Outcome Summary: VSS, room air, pain well controlled. Pt ambulating, voiding, and tolerating PO. Discharge instructions completed. Discharge home today with daughter.

## 2020-08-11 NOTE — TELEPHONE ENCOUNTER
Left her a message letting her know she was cleared and to call back if she had any other questions or concerns.  Babs

## 2020-08-11 NOTE — ANESTHESIA PROCEDURE NOTES
Peripheral Block      Patient reassessed immediately prior to procedure    Patient location during procedure: post-op  Reason for block: at surgeon's request and post-op pain management  Performed by  CRNA: Loco Hawley CRNA  Assisted by: Bella Recinos RN  Preanesthetic Checklist  Completed: patient identified, site marked, surgical consent, pre-op evaluation, timeout performed, IV checked, risks and benefits discussed and monitors and equipment checked  Prep:  Pt Position: supine  Sterile barriers:cap, gloves, mask and sterile barriers  Prep: ChloraPrep  Patient monitoring: blood pressure monitoring, continuous pulse oximetry and EKG  Procedure  Performed under: local infiltration  Guidance:ultrasound guided  Images:still images not obtained    Laterality:left  Block Type:adductor canal block  Injection Technique:catheter  Needle Type:echogenic and short-bevel  Needle Gauge:20 G  Resistance on Injection: none  Catheter size: 20g.    Medications Used: dexamethasone sodium phosphate injection, 2 mg  bupivacaine PF (MARCAINE) 0.25 % injection, 30 mL  Med admintered at 8/11/2020 8:15 AM      Post Assessment  Injection Assessment: negative aspiration for heme, incremental injection and no paresthesia on injection  Patient Tolerance:comfortable throughout block  Complications:no  Additional Notes  Procedure:             The pt was placed in the Supine position.  The Insertion site was  prepped .  .  Skin and cutaneous tissue was infiltrated and anesthetized with 1% Lidocaine 3 mls via a 25g needle.  A BBraun 4 inch 20g echogenic needle was then  inserted approximately midline, mid-thigh and advanced In-plane with Ultrasound guidance.  Normal Saline PSF was utilized for hydrodissection of tissue.  The Vastus medialis and Sartorius muscle where visualized and the needle tip was placed in the adductor canal,  lateral to the femoral artery.  LA injection spread was visualized, injection was incremental 1-5ml,  injection pressure was normal or little, no intraneural injection, no vascular injection.  LA dose was injected thru the needle(see dose above).    Thank you.

## 2020-08-11 NOTE — ANESTHESIA PREPROCEDURE EVALUATION
Anesthesia Evaluation     Patient summary reviewed and Nursing notes reviewed   NPO Solid Status: > 8 hours  NPO Liquid Status: > 2 hours           Airway   Mallampati: I  TM distance: >3 FB  Neck ROM: full  No difficulty expected  Dental      Pulmonary    (-) COPD, asthma, shortness of breath, recent URI, sleep apnea, not a smokerPneumonia: jan 2020.    ROS comment: Severe allergies   Cardiovascular     (-) hypertension, dysrhythmias, angina, cardiac stents, hyperlipidemia      Neuro/Psych  (-) seizures, CVA  GI/Hepatic/Renal/Endo    (-) diabetes    Musculoskeletal     Abdominal    Substance History      OB/GYN          Other   arthritis,                      Anesthesia Plan    ASA 2     general   (Propofol Infusion as part of Anti PONV tech )  intravenous induction     Anesthetic plan, all risks, benefits, and alternatives have been provided, discussed and informed consent has been obtained with: patient.    Plan discussed with CRNA.

## 2020-08-12 NOTE — PROGRESS NOTES
[facility]    Nerve Cath Post Op Call    Patient Name: Asiya Cleaning  :  1945  MRN:  [id number]  Date of Discharge: 2020    Treatment Plan

## 2020-08-26 ENCOUNTER — OFFICE VISIT (OUTPATIENT)
Dept: ORTHOPEDIC SURGERY | Facility: CLINIC | Age: 75
End: 2020-08-26

## 2020-08-26 DIAGNOSIS — Z96.652 STATUS POST TOTAL KNEE REPLACEMENT, LEFT: Primary | ICD-10-CM

## 2020-08-26 DIAGNOSIS — Z47.89 ORTHOPEDIC AFTERCARE: ICD-10-CM

## 2020-08-26 PROCEDURE — 99024 POSTOP FOLLOW-UP VISIT: CPT | Performed by: ORTHOPAEDIC SURGERY

## 2020-08-26 NOTE — PROGRESS NOTES
[facility] Note    Subjective     Chief Complaint   Patient presents with   • Post-op     2 weeks s/p JONO (L) TKA 08/11/2020        HPI    It has been 2  week(s) since Ms. Cleaning's last visit. She returns to clinic today for postoperative follow-up of left knee JONO 08/11/2020. She rates her pain a 3/10 on the pain scale. Previous/current treatments: NSAIDS and physical therapy. Current symptoms: swelling and stiffness. The pain is worse with climbing stairs and sleeping; resting and ice improve the pain. Overall, she is doing better.  Range of motion has improved following the manipulation.  Fully ambulatory without external aids.  60% improvement in her preoperative symptoms compared to before her knee replacement.    I have reviewed the following portions of the patient's history and agree with: History of Present Illness and Review of Systems    Patient Active Problem List   Diagnosis   • Primary osteoarthritis of left knee   • Cough   • Seasonal allergic rhinitis due to pollen   • S/P total knee arthroplasty, left   • Depression   • Status post surgery   • Status post total knee replacement, left     Past Medical History:   Diagnosis Date   • Allergic rhinitis    • Baker's cyst of knee, left    • Bronchitis    • Depression    • Hearing loss     LEFT EAR NO AIDS   • Influenza    • Macular hole     HX OF   • Osteoarthritis    • PNA (pneumonia)    • Seasonal allergies    • Wears glasses       Past Surgical History:   Procedure Laterality Date   • CATARACT EXTRACTION Left 2010   • COLONOSCOPY  2008    clear   • DENTAL PROCEDURE      extraction   • DENTAL PROCEDURE      IMPLANT   • KNEE JOINT MANIPULATION Left 8/11/2020    Procedure: KNEE MANIPULATION LEFT;  Surgeon: Hadley Gibson MD;  Location:  [facility];  Service: Orthopedics;  Laterality: Left;   • TOTAL KNEE ARTHROPLASTY Left 5/21/2020    Procedure: TOTAL KNEE ARTHROPLASTY LEFT;  Surgeon: Hadley Gibson MD;  Location: [facility];   Service: Orthopedics;  Laterality: Left;   • VITRECTOMY PARS PLANA W/ REPAIR OF MACULAR HOLE  2009      Family History   Problem Relation Age of Onset   • Osteoarthritis Mother    • Pneumonia Mother    • Dementia Father      Social History     Socioeconomic History   • Marital status:      Spouse name: Not on file   • Number of children: 2   • Years of education: Not on file   • Highest education level: Not on file   Occupational History   • Occupation: take out resturant owner   Tobacco Use   • Smoking status: Never Smoker   • Smokeless tobacco: Never Used   Substance and Sexual Activity   • Alcohol use: Never     Frequency: Never   • Drug use: Never   • Sexual activity: Defer      Current Outpatient Medications on File Prior to Visit   Medication Sig Dispense Refill   • acetaminophen (TYLENOL) 325 MG tablet Take 650 mg by mouth Every 6 (Six) Hours As Needed for Mild Pain .     • CBD (cannabidiol) oral oil Take 1 mL by mouth Daily.     • cetirizine (zyrTEC) 10 MG tablet Take 10 mg by mouth Daily.     • diphenhydrAMINE (BENADRYL) 25 mg capsule Take 25 mg by mouth At Night As Needed for Allergies.     • docusate sodium (Colace) 100 MG capsule Take 1 capsule by mouth 2 (Two) Times a Day. 40 capsule 0   • fluticasone (FLONASE) 50 MCG/ACT nasal spray 2 sprays into the nostril(s) as directed by provider Daily.     • ibuprofen (ADVIL,MOTRIN) 200 MG tablet Take 3 tablets by mouth 3 (Three) Times a Day With Meals.     • LUTEIN PO Take  by mouth.     • melatonin 5 MG tablet tablet Take 10 mg by mouth Every Night.     • montelukast (SINGULAIR) 10 MG tablet Take 1 tablet by mouth Every Night. 30 tablet 11   • Multiple Vitamins-Minerals (MULTIVITAMIN ADULT PO) Take  by mouth.     • oxyCODONE (Roxicodone) 5 MG immediate release tablet Take 1 tablet by mouth Every 4 (Four) Hours As Needed for Moderate Pain . 25 tablet 0   • vitamin C (ASCORBIC ACID) 500 MG tablet Take 1,000 mg by mouth Daily.       No current  facility-administered medications on file prior to visit.       Allergies   Allergen Reactions   • Penicillins Itching   • Doxycycline Rash        Review of Systems   Constitutional: Negative for activity change, appetite change, chills, diaphoresis, fatigue, fever and unexpected weight change.   HENT: Negative for congestion, dental problem, drooling, ear discharge, ear pain, facial swelling, hearing loss, mouth sores, nosebleeds, postnasal drip, rhinorrhea, sinus pressure, sneezing, sore throat, tinnitus, trouble swallowing and voice change.    Eyes: Negative for photophobia, pain, discharge, redness, itching and visual disturbance.   Respiratory: Negative for apnea, cough, choking, chest tightness, shortness of breath, wheezing and stridor.    Cardiovascular: Negative for chest pain, palpitations and leg swelling.   Gastrointestinal: Negative for abdominal distention, abdominal pain, anal bleeding, blood in stool, constipation, diarrhea, nausea, rectal pain and vomiting.   Endocrine: Negative for cold intolerance, heat intolerance, polydipsia, polyphagia and polyuria.   Genitourinary: Negative for decreased urine volume, difficulty urinating, dysuria, enuresis, flank pain, frequency, genital sores, hematuria and urgency.   Musculoskeletal: Positive for arthralgias. Negative for back pain, gait problem, joint swelling, myalgias, neck pain and neck stiffness.   Skin: Negative for color change, pallor, rash and wound.   Allergic/Immunologic: Negative for environmental allergies, food allergies and immunocompromised state.   Neurological: Negative for dizziness, tremors, seizures, syncope, facial asymmetry, speech difficulty, weakness, light-headedness, numbness and headaches.   Hematological: Negative for adenopathy. Does not bruise/bleed easily.   Psychiatric/Behavioral: Negative for agitation, behavioral problems, confusion, decreased concentration, dysphoric mood, hallucinations, self-injury, sleep disturbance  and suicidal ideas. The patient is not nervous/anxious and is not hyperactive.         Objective      Physical Exam  LMP  (LMP Unknown)     There is no height or weight on file to calculate BMI.    General:   Mental Status:  Alert   Appearance: Cooperative, in no acute distress   Build and Nutrition: Well-nourished well-developed female   Orientation: Alert and oriented to person, place and time   Posture: Normal   Gait: Normal    Integument:              Left knee: Wound is well-healed with no signs of infection     Lower Extremities:              Left Knee:                          Tenderness:    None                          Effusion:          None                          Swelling:          None                          Crepitus:          None                          Range of motion:        Extension:       0°                                                              Flexion:           120°  Instability:        No varus laxity, no valgus laxity, negative anterior drawer  Deformities:     None      Assessment and Plan     Asiya was seen today for post-op.    Diagnoses and all orders for this visit:    Status post total knee replacement, left    Orthopedic aftercare        1. Status post total knee replacement, left    2. Orthopedic aftercare        I reviewed my findings with the patient today.  Knee stiffness has improved following the manipulation.  I will see her back in 3 months with an x-ray, but sooner for any problems.  She will continue with range of motion and strengthening exercises.    Return in about 3 months (around 11/26/2020) for Recheck with X-Rays.    Medical Decision Making  Management Options : physical/occupational therapy      Hadley Gibson MD  08/26/20  12:15    Dragon disclaimer:  Much of this encounter note is an electronic transcription/translation of spoken language to printed text. The electronic translation of spoken language may permit erroneous, or at times, nonsensical  words or phrases to be inadvertently transcribed; Although I have reviewed the note for such errors, some may still exist.

## 2020-11-23 ENCOUNTER — OFFICE VISIT (OUTPATIENT)
Dept: ORTHOPEDIC SURGERY | Facility: CLINIC | Age: 75
End: 2020-11-23

## 2020-11-23 VITALS — HEIGHT: 66 IN | OXYGEN SATURATION: 98 % | WEIGHT: 170.4 LBS | BODY MASS INDEX: 27.38 KG/M2 | HEART RATE: 84 BPM

## 2020-11-23 DIAGNOSIS — Z96.652 STATUS POST TOTAL KNEE REPLACEMENT, LEFT: Primary | ICD-10-CM

## 2020-11-23 DIAGNOSIS — Z09 POSTOPERATIVE EXAMINATION: ICD-10-CM

## 2020-11-23 PROCEDURE — 99213 OFFICE O/P EST LOW 20 MIN: CPT | Performed by: ORTHOPAEDIC SURGERY

## 2020-11-23 NOTE — PROGRESS NOTES
[facility] – [place] Orthopaedic Surgery Clinic Note    Subjective     Chief Complaint   Patient presents with   • Follow-up     3 month recheck- s/p JONO (L) TKA 08/11/2020, 6 months s/p (L) TKA 05/21/2020        HPI    It has been 3  month(s) since Ms. Cleaning's last visit. She returns to clinic today for postoperative follow-up of left knee arthroplasty. She rates her pain a 0/10 on the pain scale. Previous/current treatments: NSAIDS and physical therapy. Current symptoms: popping and stiffness. The pain is worse with climbing stairs; pain medication and/or NSAID improve the pain. Overall, she is doing better.  90% improvement compared to her preoperative symptoms.  Fully ambulatory that external aids.    I have reviewed the following portions of the patient's history and agree with: History of Present Illness and Review of Systems    Patient Active Problem List   Diagnosis   • Primary osteoarthritis of left knee   • Cough   • Seasonal allergic rhinitis due to pollen   • S/P total knee arthroplasty, left   • Depression   • Status post surgery   • Status post total knee replacement, left     Past Medical History:   Diagnosis Date   • Allergic rhinitis    • Baker's cyst of knee, left    • Bronchitis    • Depression    • Hearing loss     LEFT EAR NO AIDS   • Influenza    • Macular hole     HX OF   • Osteoarthritis    • PNA (pneumonia)    • Seasonal allergies    • Wears glasses       Past Surgical History:   Procedure Laterality Date   • CATARACT EXTRACTION Left 2010   • COLONOSCOPY  2008    clear   • DENTAL PROCEDURE      extraction   • DENTAL PROCEDURE      IMPLANT   • KNEE JOINT MANIPULATION Left 8/11/2020    Procedure: KNEE MANIPULATION LEFT;  Surgeon: Hadley Gibson MD;  Location:  [facility];  Service: Orthopedics;  Laterality: Left;   • TOTAL KNEE ARTHROPLASTY Left 5/21/2020    Procedure: TOTAL KNEE ARTHROPLASTY LEFT;  Surgeon: Hadley Gibson MD;  Location:  [facility];  Service: Orthopedics;  Laterality: Left;   •  VITRECTOMY PARS PLANA W/ REPAIR OF MACULAR HOLE  2009      Family History   Problem Relation Age of Onset   • Osteoarthritis Mother    • Pneumonia Mother    • Dementia Father      Social History     Socioeconomic History   • Marital status:      Spouse name: Not on file   • Number of children: 2   • Years of education: Not on file   • Highest education level: Not on file   Occupational History   • Occupation: take out resturant owner   Tobacco Use   • Smoking status: Never Smoker   • Smokeless tobacco: Never Used   Substance and Sexual Activity   • Alcohol use: Never     Frequency: Never   • Drug use: Never   • Sexual activity: Defer      Current Outpatient Medications on File Prior to Visit   Medication Sig Dispense Refill   • acetaminophen (TYLENOL) 325 MG tablet Take 650 mg by mouth Every 6 (Six) Hours As Needed for Mild Pain .     • CBD (cannabidiol) oral oil Take 1 mL by mouth Daily.     • cetirizine (zyrTEC) 10 MG tablet Take 10 mg by mouth Daily.     • diphenhydrAMINE (BENADRYL) 25 mg capsule Take 25 mg by mouth At Night As Needed for Allergies.     • fluticasone (FLONASE) 50 MCG/ACT nasal spray 2 sprays into the nostril(s) as directed by provider Daily.     • ibuprofen (ADVIL,MOTRIN) 200 MG tablet Take 3 tablets by mouth 3 (Three) Times a Day With Meals.     • LUTEIN PO Take  by mouth.     • melatonin 5 MG tablet tablet Take 10 mg by mouth Every Night.     • montelukast (SINGULAIR) 10 MG tablet Take 1 tablet by mouth Every Night. 30 tablet 11   • Multiple Vitamins-Minerals (MULTIVITAMIN ADULT PO) Take  by mouth.     • vitamin C (ASCORBIC ACID) 500 MG tablet Take 1,000 mg by mouth Daily.     • docusate sodium (Colace) 100 MG capsule Take 1 capsule by mouth 2 (Two) Times a Day. 40 capsule 0   • oxyCODONE (Roxicodone) 5 MG immediate release tablet Take 1 tablet by mouth Every 4 (Four) Hours As Needed for Moderate Pain . 25 tablet 0     No current facility-administered medications on file prior to visit.        Allergies   Allergen Reactions   • Penicillins Itching   • Doxycycline Rash        Review of Systems   Constitutional: Negative for activity change, appetite change, chills, diaphoresis, fatigue, fever and unexpected weight change.   HENT: Negative for congestion, dental problem, drooling, ear discharge, ear pain, facial swelling, hearing loss, mouth sores, nosebleeds, postnasal drip, rhinorrhea, sinus pressure, sneezing, sore throat, tinnitus, trouble swallowing and voice change.    Eyes: Negative for photophobia, pain, discharge, redness, itching and visual disturbance.   Respiratory: Negative for apnea, cough, choking, chest tightness, shortness of breath, wheezing and stridor.    Cardiovascular: Negative for chest pain, palpitations and leg swelling.   Gastrointestinal: Negative for abdominal distention, abdominal pain, anal bleeding, blood in stool, constipation, diarrhea, nausea, rectal pain and vomiting.   Endocrine: Negative for cold intolerance, heat intolerance, polydipsia, polyphagia and polyuria.   Genitourinary: Negative for decreased urine volume, difficulty urinating, dysuria, enuresis, flank pain, frequency, genital sores, hematuria and urgency.   Musculoskeletal: Positive for arthralgias. Negative for back pain, gait problem, joint swelling, myalgias, neck pain and neck stiffness.   Skin: Negative for color change, pallor, rash and wound.   Allergic/Immunologic: Negative for environmental allergies, food allergies and immunocompromised state.   Neurological: Negative for dizziness, tremors, seizures, syncope, facial asymmetry, speech difficulty, weakness, light-headedness, numbness and headaches.   Hematological: Negative for adenopathy. Does not bruise/bleed easily.   Psychiatric/Behavioral: Negative for agitation, behavioral problems, confusion, decreased concentration, dysphoric mood, hallucinations, self-injury, sleep disturbance and suicidal ideas. The patient is not nervous/anxious and is  "not hyperactive.         Objective      Physical Exam  Pulse 84   Ht 167.6 cm (65.98\")   Wt 77.3 kg (170 lb 6.4 oz)   LMP  (LMP Unknown)   SpO2 98%   BMI 27.52 kg/m²     Body mass index is 27.52 kg/m².    General:   Mental Status:  Alert   Appearance: Cooperative, in no acute distress   Build and Nutrition: Well-nourished well-developed female   Orientation: Alert and oriented to person, place and time   Posture: Normal   Gait: Normal    Integument:              Left knee: Wound is well-healed with no signs of infection     Lower Extremities:              Left Knee:                          Tenderness:    None                          Effusion:          None                          Swelling:          None                          Crepitus:          None                          Range of motion:        Extension:       0°                                                              Flexion:           130°  Instability:        No varus laxity, no valgus laxity, negative anterior drawer  Deformities:     None    Imaging/Studies  Imaging Results (Last 24 Hours)     Procedure Component Value Units Date/Time    XR Knee 3+ View With [place] Left [[id number]] Resulted: 11/23/20 1126     Updated: 11/23/20 1127    Narrative:      Left Knee Radiographs  Indication: status-post left total knee arthroplasty  Views: AP, lateral, and sunrise views of the left knee    Comparison: no change compared to prior study, 7/20/2020    Findings:   The components are well aligned, with no signs of loosening or failure.            Assessment and Plan     Diagnoses and all orders for this visit:    1. Status post total knee replacement, left (Primary)  -     XR Knee 3+ View With [place] Left    2. Postoperative examination        1. Status post total knee replacement, left    2. Postoperative examination        I reviewed my findings with the patient today.  Her left total knee arthroplasty appears to be functioning well.  I will see her " back in 6 months, which will be a 1 year checkup with x-rays.  I will see her back sooner for any problems.    Return in about 6 months (around 5/23/2021) for Recheck with X-Rays.    Medical Decision Making  Data/Risk: radiology tests and independent visualization of imaging, lab tests, or EMG/NCV    Hadley Gibson MD  11/23/20  11:52 JANKI Mina disclaimer:  Much of this encounter note is an electronic transcription/translation of spoken language to printed text. The electronic translation of spoken language may permit erroneous, or at times, nonsensical words or phrases to be inadvertently transcribed; Although I have reviewed the note for such errors, some may still exist.

## 2021-03-22 ENCOUNTER — OFFICE VISIT (OUTPATIENT)
Dept: ORTHOPEDIC SURGERY | Facility: CLINIC | Age: 76
End: 2021-03-22

## 2021-03-22 ENCOUNTER — TELEPHONE (OUTPATIENT)
Dept: ORTHOPEDIC SURGERY | Facility: CLINIC | Age: 76
End: 2021-03-22

## 2021-03-22 VITALS
SYSTOLIC BLOOD PRESSURE: 143 MMHG | HEART RATE: 101 BPM | HEIGHT: 66 IN | WEIGHT: 172 LBS | BODY MASS INDEX: 27.64 KG/M2 | DIASTOLIC BLOOD PRESSURE: 81 MMHG

## 2021-03-22 DIAGNOSIS — Z96.652 STATUS POST TOTAL KNEE REPLACEMENT, LEFT: Primary | ICD-10-CM

## 2021-03-22 DIAGNOSIS — Z09 POSTOPERATIVE EXAMINATION: ICD-10-CM

## 2021-03-22 PROCEDURE — 99214 OFFICE O/P EST MOD 30 MIN: CPT | Performed by: ORTHOPAEDIC SURGERY

## 2021-03-22 RX ORDER — AMLODIPINE BESYLATE 5 MG/1
TABLET ORAL
COMMUNITY
Start: 2021-03-06 | End: 2021-12-09

## 2021-03-22 NOTE — TELEPHONE ENCOUNTER
LEFT VOICEMAIL WITH EMERGENCY CONTACT, PATIENT'S DAUGHTER, THAT WE WOULD LIKE PATIENT TO COME IN ON A Monday OR Wednesday TO SEE ROSE WHEN DR. CURIEL IS AVAILABLE. PATIENT WILL PROBABLY HAVE TO BE TRANSFERRED TO CLINIC TO BE OVERBOOKED.

## 2021-03-22 NOTE — TELEPHONE ENCOUNTER
Left voicemail with patient to get her appointment on Friday rescheduled to a Monday or Wednesday when Dr. Gibson will be in clinic per Ligia Pena. Patient is a Dr. Gibson patient and needs to come to clinic when he is here.

## 2021-03-22 NOTE — PROGRESS NOTES
[facility] – [place], [place] Orthopaedic Surgery Clinic Note    Subjective     Chief Complaint   Patient presents with   • Follow-up     4 month recheck- JONO (L) TKA 08/11/2020, 6 months s/p (L) TKA 05/21/2020        GABRIELLE Cleaning is a 75 y.o. female who follows up for left knee pain. The patient underwent a left total knee arthroplasty on 05/21/2020 and a manipulation under anesthesia on 08/11/2020. Her last visit was on 11/23/2020.    The patient reports that her left knee has become increasingly tight. She has been receiving massages of the leg which was helping until approximately 2 weeks ago when she developed the tightness down through her calf. She denies any injury or pain but does admit to some soreness. She also notes that the numbness into the dorsal portion of her foot resolved around 12/2020. She denies any fevers, chills, night sweats, or back issues. She reports that she has difficulty descending stairs and believes the knee is approximately 50 percent better than prior to the total knee arthroplasty.    The patient plans to start attending the gym again this week.    I have reviewed the following portions of the patient's history and agree with: History of Present Illness and Review of Systems    Patient Active Problem List   Diagnosis   • Primary osteoarthritis of left knee   • Cough   • Seasonal allergic rhinitis due to pollen   • S/P total knee arthroplasty, left   • Depression   • Status post surgery   • Status post total knee replacement, left     Past Medical History:   Diagnosis Date   • Allergic rhinitis    • Baker's cyst of knee, left    • Bronchitis    • Depression    • Hearing loss     LEFT EAR NO AIDS   • Influenza    • Macular hole     HX OF   • Osteoarthritis    • PNA (pneumonia)    • Seasonal allergies    • Wears glasses       Past Surgical History:   Procedure Laterality Date   • CATARACT EXTRACTION Left 2010   • COLONOSCOPY  2008    clear   • DENTAL PROCEDURE      extraction   • DENTAL  PROCEDURE      IMPLANT   • KNEE JOINT MANIPULATION Left 8/11/2020    Procedure: KNEE MANIPULATION LEFT;  Surgeon: Hadley Gibson MD;  Location:  [facility];  Service: Orthopedics;  Laterality: Left;   • TOTAL KNEE ARTHROPLASTY Left 5/21/2020    Procedure: TOTAL KNEE ARTHROPLASTY LEFT;  Surgeon: Hadley Gibson MD;  Location:  [facility];  Service: Orthopedics;  Laterality: Left;   • VITRECTOMY PARS PLANA W/ REPAIR OF MACULAR HOLE  2009      Family History   Problem Relation Age of Onset   • Osteoarthritis Mother    • Pneumonia Mother    • Dementia Father      Social History     Socioeconomic History   • Marital status:      Spouse name: Not on file   • Number of children: 2   • Years of education: Not on file   • Highest education level: Not on file   Tobacco Use   • Smoking status: Never Smoker   • Smokeless tobacco: Never Used   Substance and Sexual Activity   • Alcohol use: Never   • Drug use: Never   • Sexual activity: Defer      Current Outpatient Medications on File Prior to Visit   Medication Sig Dispense Refill   • acetaminophen (TYLENOL) 325 MG tablet Take 650 mg by mouth Every 6 (Six) Hours As Needed for Mild Pain .     • amLODIPine (NORVASC) 5 MG tablet      • CBD (cannabidiol) oral oil Take 1 mL by mouth Daily.     • cetirizine (zyrTEC) 10 MG tablet Take 10 mg by mouth Daily.     • diphenhydrAMINE (BENADRYL) 25 mg capsule Take 25 mg by mouth At Night As Needed for Allergies.     • docusate sodium (Colace) 100 MG capsule Take 1 capsule by mouth 2 (Two) Times a Day. 40 capsule 0   • fluticasone (FLONASE) 50 MCG/ACT nasal spray 2 sprays into the nostril(s) as directed by provider Daily.     • ibuprofen (ADVIL,MOTRIN) 200 MG tablet Take 3 tablets by mouth 3 (Three) Times a Day With Meals.     • LUTEIN PO Take  by mouth.     • melatonin 5 MG tablet tablet Take 10 mg by mouth Every Night.     • montelukast (SINGULAIR) 10 MG tablet Take 1 tablet by mouth Every Night. 30 tablet 11   • Multiple  "Vitamins-Minerals (MULTIVITAMIN ADULT PO) Take  by mouth.     • oxyCODONE (Roxicodone) 5 MG immediate release tablet Take 1 tablet by mouth Every 4 (Four) Hours As Needed for Moderate Pain . 25 tablet 0   • vitamin C (ASCORBIC ACID) 500 MG tablet Take 1,000 mg by mouth Daily.       No current facility-administered medications on file prior to visit.      Allergies   Allergen Reactions   • Penicillins Itching   • Doxycycline Rash        Review of Systems   Constitutional: Negative.    HENT: Negative.    Eyes: Negative.    Respiratory: Negative.    Cardiovascular: Negative.    Gastrointestinal: Negative.    Endocrine: Negative.    Genitourinary: Negative.    Musculoskeletal: Positive for arthralgias.   Skin: Negative.    Allergic/Immunologic: Negative.    Neurological: Negative.    Hematological: Negative.    Psychiatric/Behavioral: Negative.         Objective      Physical Exam  /81   Pulse 101   Ht 167.6 cm (66\")   Wt 78 kg (172 lb)   LMP  (LMP Unknown)   BMI 27.76 kg/m²     Body mass index is 27.76 kg/m².    General:   Mental Status:  Alert   Appearance: Cooperative, in no acute distress   Build and Nutrition: Well-nourished well-developed female   Orientation: Alert and oriented to person, place and time   Posture: Normal   Gait: Normal    Integument:              Left knee: Wound is well-healed with no signs of infection     Lower Extremities:              Left Knee:                          Tenderness:    No medial or lateral joint line tenderness                          Effusion:          None                          Swelling:          None                          Crepitus:          None                          Range of motion:        Extension:       0°                                                              Flexion:           120°  Instability:        No varus laxity, no valgus laxity, negative anterior drawer  Deformities:     None    Imaging/Studies  Imaging Results (Last 24 Hours)     " Procedure Component Value Units Date/Time    XR Knee 3+ View With [place] Left [[id number]] Resulted: 03/22/21 1627     Updated: 03/22/21 1627    Narrative:      Left Knee Radiographs  Indication: status-post left total knee arthroplasty  Views: AP, lateral, and sunrise views of the left knee    Comparison: no change compared to prior study, 11/23/2020    Findings:   The components are well aligned, with no signs of loosening or failure.            Assessment and Plan     Diagnoses and all orders for this visit:    1. Status post total knee replacement, left (Primary)  -     XR Knee 3+ View With Sunrise Left  -     C-reactive Protein; Future  -     CBC & Differential; Future  -     Sedimentation Rate; Future  -     Ambulatory Referral to Physical Therapy Evaluate and treat    2. Postoperative examination        1. Status post total knee replacement, left    2. Postoperative examination        I discussed the treatment options with the patient including physical therapy and lab testing to rule out infection in the knee, which I think is low likelihood. Physical therapy will be ordered to be performed at the center of the patient's choosing.    Return in about 2 months (around 5/22/2021).    ATTESTATION  Scribed for Hadley Gibson MD by Lisa Amaya.  03/22/21   19:34 EDT    I have personally performed the services described in this document as scribed by the above individual, and it is both accurate and complete.  Hadley Gibson MD  3/23/2021  11:53 EDT

## 2021-03-23 ENCOUNTER — LAB (OUTPATIENT)
Dept: LAB | Facility: HOSPITAL | Age: 76
End: 2021-03-23

## 2021-03-23 DIAGNOSIS — Z96.652 STATUS POST TOTAL KNEE REPLACEMENT, LEFT: ICD-10-CM

## 2021-03-23 LAB
BASOPHILS # BLD AUTO: 0.03 10*3/MM3 (ref 0–0.2)
BASOPHILS NFR BLD AUTO: 0.7 % (ref 0–1.5)
CRP SERPL-MCNC: <0.3 MG/DL (ref 0–0.5)
DEPRECATED RDW RBC AUTO: 42.6 FL (ref 37–54)
EOSINOPHIL # BLD AUTO: 0.1 10*3/MM3 (ref 0–0.4)
EOSINOPHIL NFR BLD AUTO: 2.2 % (ref 0.3–6.2)
ERYTHROCYTE [DISTWIDTH] IN BLOOD BY AUTOMATED COUNT: 12.7 % (ref 12.3–15.4)
ERYTHROCYTE [SEDIMENTATION RATE] IN BLOOD: 13 MM/HR (ref 0–30)
HCT VFR BLD AUTO: 42.7 % (ref 34–46.6)
HGB BLD-MCNC: 14.5 G/DL (ref 12–15.9)
IMM GRANULOCYTES # BLD AUTO: 0.01 10*3/MM3 (ref 0–0.05)
IMM GRANULOCYTES NFR BLD AUTO: 0.2 % (ref 0–0.5)
LYMPHOCYTES # BLD AUTO: 1.4 10*3/MM3 (ref 0.7–3.1)
LYMPHOCYTES NFR BLD AUTO: 31.1 % (ref 19.6–45.3)
MCH RBC QN AUTO: 31.4 PG (ref 26.6–33)
MCHC RBC AUTO-ENTMCNC: 34 G/DL (ref 31.5–35.7)
MCV RBC AUTO: 92.4 FL (ref 79–97)
MONOCYTES # BLD AUTO: 0.59 10*3/MM3 (ref 0.1–0.9)
MONOCYTES NFR BLD AUTO: 13.1 % (ref 5–12)
NEUTROPHILS NFR BLD AUTO: 2.37 10*3/MM3 (ref 1.7–7)
NEUTROPHILS NFR BLD AUTO: 52.7 % (ref 42.7–76)
NRBC BLD AUTO-RTO: 0 /100 WBC (ref 0–0.2)
PLATELET # BLD AUTO: 194 10*3/MM3 (ref 140–450)
PMV BLD AUTO: 11.1 FL (ref 6–12)
RBC # BLD AUTO: 4.62 10*6/MM3 (ref 3.77–5.28)
WBC # BLD AUTO: 4.5 10*3/MM3 (ref 3.4–10.8)

## 2021-03-23 PROCEDURE — 36415 COLL VENOUS BLD VENIPUNCTURE: CPT

## 2021-03-23 PROCEDURE — 85025 COMPLETE CBC W/AUTO DIFF WBC: CPT

## 2021-03-23 PROCEDURE — 86140 C-REACTIVE PROTEIN: CPT

## 2021-03-23 PROCEDURE — 85652 RBC SED RATE AUTOMATED: CPT

## 2021-03-24 ENCOUNTER — TRANSCRIBE ORDERS (OUTPATIENT)
Dept: ADMINISTRATIVE | Facility: HOSPITAL | Age: 76
End: 2021-03-24

## 2021-03-24 DIAGNOSIS — R05.9 COUGH: ICD-10-CM

## 2021-03-24 DIAGNOSIS — K21.9 GASTROESOPHAGEAL REFLUX DISEASE WITHOUT ESOPHAGITIS: Primary | ICD-10-CM

## 2021-03-25 NOTE — PROGRESS NOTES
I called the patient and discussed her lab results.  White blood cell count was 4.50 sed rate was 13 and her CRP was less than 0.30.  All results are normal.  She is going to continue physical therapy, and follow-up with me as planned.  She was happy to know these blood results, and appreciated the call.

## 2021-04-02 ENCOUNTER — APPOINTMENT (OUTPATIENT)
Dept: PREADMISSION TESTING | Facility: HOSPITAL | Age: 76
End: 2021-04-02

## 2021-04-02 PROCEDURE — U0004 COV-19 TEST NON-CDC HGH THRU: HCPCS

## 2021-04-02 PROCEDURE — C9803 HOPD COVID-19 SPEC COLLECT: HCPCS

## 2021-04-03 LAB — SARS-COV-2 RNA NOSE QL NAA+PROBE: DETECTED

## 2021-04-05 ENCOUNTER — APPOINTMENT (OUTPATIENT)
Dept: GENERAL RADIOLOGY | Facility: HOSPITAL | Age: 76
End: 2021-04-05

## 2021-04-06 ENCOUNTER — TELEPHONE (OUTPATIENT)
Dept: PREADMISSION TESTING | Facility: HOSPITAL | Age: 76
End: 2021-04-06

## 2021-04-20 ENCOUNTER — APPOINTMENT (OUTPATIENT)
Dept: PREADMISSION TESTING | Facility: HOSPITAL | Age: 76
End: 2021-04-20

## 2021-04-23 ENCOUNTER — APPOINTMENT (OUTPATIENT)
Dept: GENERAL RADIOLOGY | Facility: HOSPITAL | Age: 76
End: 2021-04-23

## 2021-04-30 ENCOUNTER — APPOINTMENT (OUTPATIENT)
Dept: GENERAL RADIOLOGY | Facility: HOSPITAL | Age: 76
End: 2021-04-30

## 2021-05-03 ENCOUNTER — HOSPITAL ENCOUNTER (OUTPATIENT)
Dept: GENERAL RADIOLOGY | Facility: HOSPITAL | Age: 76
Discharge: HOME | End: 2021-05-03
Admitting: GENERAL PRACTICE

## 2021-05-03 ENCOUNTER — APPOINTMENT (OUTPATIENT)
Dept: PREADMISSION TESTING | Facility: HOSPITAL | Age: 76
End: 2021-05-03

## 2021-05-03 DIAGNOSIS — R05.9 COUGH: ICD-10-CM

## 2021-05-03 DIAGNOSIS — K21.9 GASTROESOPHAGEAL REFLUX DISEASE WITHOUT ESOPHAGITIS: ICD-10-CM

## 2021-05-03 DIAGNOSIS — R13.14 PHARYNGOESOPHAGEAL DYSPHAGIA: Primary | ICD-10-CM

## 2021-05-03 PROCEDURE — 74230 X-RAY XM SWLNG FUNCJ C+: CPT

## 2021-05-03 PROCEDURE — 92611 MOTION FLUOROSCOPY/SWALLOW: CPT

## 2021-05-03 PROCEDURE — A9270 NON-COVERED ITEM OR SERVICE: HCPCS | Performed by: GENERAL PRACTICE

## 2021-05-03 PROCEDURE — 63710000001 BARIUM SULFATE 40 % PASTE: Performed by: GENERAL PRACTICE

## 2021-05-03 PROCEDURE — 63710000001 BARIUM SULFATE 40 % RECONSTITUTED SUSPENSION: Performed by: GENERAL PRACTICE

## 2021-05-03 RX ADMIN — BARIUM SULFATE 100 ML: 0.81 POWDER, FOR SUSPENSION ORAL at 10:35

## 2021-05-03 RX ADMIN — BARIUM SULFATE 20 ML: 400 PASTE ORAL at 10:35

## 2021-05-03 NOTE — MBS/VFSS/FEES
Outpatient Speech Language Pathology   Adult Swallow Initial Evaluation   Jose   Modified Barium Swallow Study (MBS)     Patient Name: Asiya Cleaning  : 1945  MRN: [id number]  Today's Date: 5/3/2021         Visit Date: 2021   Patient Active Problem List   Diagnosis   • Primary osteoarthritis of left knee   • Cough   • Seasonal allergic rhinitis due to pollen   • S/P total knee arthroplasty, left   • Depression   • Status post surgery   • Status post total knee replacement, left        Past Medical History:   Diagnosis Date   • Allergic rhinitis    • Baker's cyst of knee, left    • Bronchitis    • Depression    • Hearing loss     LEFT EAR NO AIDS   • Influenza    • Macular hole     HX OF   • Osteoarthritis    • PNA (pneumonia)    • Seasonal allergies    • Wears glasses         Past Surgical History:   Procedure Laterality Date   • CATARACT EXTRACTION Left    • COLONOSCOPY      clear   • DENTAL PROCEDURE      extraction   • DENTAL PROCEDURE      IMPLANT   • KNEE JOINT MANIPULATION Left 2020    Procedure: KNEE MANIPULATION LEFT;  Surgeon: Hadley Gibson MD;  Location: [facility] OR;  Service: Orthopedics;  Laterality: Left;   • TOTAL KNEE ARTHROPLASTY Left 2020    Procedure: TOTAL KNEE ARTHROPLASTY LEFT;  Surgeon: Hadley Gibson MD;  Location:  [facility] OR;  Service: Orthopedics;  Laterality: Left;   • VITRECTOMY PARS PLANA W/ REPAIR OF MACULAR HOLE           Visit Dx:     ICD-10-CM ICD-9-CM   1. Pharyngoesophageal dysphagia  R13.14 787.24   2. Gastroesophageal reflux disease without esophagitis  K21.9 530.81   3. Cough  R05 786.2           SLP Adult Swallow Evaluation     Row Name 21 1020       Rehab Evaluation    Document Type  evaluation  -RD    Subjective Information  no complaints  -RD    Patient Observations  alert;cooperative;agree to therapy  -RD    Patient/Family/Caregiver Comments/Observations  No family present  -RD    Patient Effort  good  -RD       General  Information    Patient Profile Reviewed  yes  -RD    Pertinent History Of Current Problem  Pt presents for OP MBS 2' c/o occasional choking on liquids, especially when drinking out of a tervis cup. Pt reports chronic coughing/throat clearing as well as allergies and post nasal drip. PMH significant for prior COVID, PNA, GERD, osteoarthritis. MBS to r/o pharyngeal dysphagia   -RD    Current Method of Nutrition  regular textures;thin liquids  -RD    Precautions/Limitations, Vision  WFL;for purposes of eval  -RD    Precautions/Limitations, Hearing  WFL;for purposes of eval  -RD    Prior Level of Function-Communication  WFL  -RD    Prior Level of Function-Swallowing  no diet consistency restrictions;other (see comments);esophageal concerns occasional choking on thins  -RD    Plans/Goals Discussed with  patient;agreed upon  -RD    Barriers to Rehab  none identified  -RD    Patient's Goals for Discharge  eat/drink without coughing/choking  -RD       Pain    Additional Documentation  Pain Scale: Numbers Pre/Post-Treatment (Group)  -RD       Pain Scale: Numbers Pre/Post-Treatment    Pretreatment Pain Rating  0/10 - no pain  -RD    Posttreatment Pain Rating  0/10 - no pain  -RD       MBS/VFSS    Utensils Used  spoon;cup;straw  -RD    Consistencies Trialed  thin liquids;pudding thick;regular textures  -RD       MBS/VFSS Interpretation    Oral Phase, Comment  Functional oral phase of the swallow. Adequate mastication of regular solids and functional oral transit w/ all consistencies. No significant oral residue or weakness observed.   -RD       Initiation of Pharyngeal Swallow    Initiation of Pharyngeal Swallow  bolus in pyriform sinuses  -RD    Rosenbek's Scale  thin:;2--->level 2;pudding/puree:;regular textures:;1--->level 1  -RD    Attempted Compensatory Maneuvers  bolus size;bolus presentation style;additional subsequent swallow  -RD    Response to Attempted Compensatory Maneuvers  prevented penetration;other (see  comments) w/ single sips  -RD    Pharyngeal Phase, Comment  Mild pharyngoesophageal dysphagia. Penetration during the swallow w/ thin liquids via multiple large consecutive straw sips only. Penetration cleared upon completion of the swallow. No aspiration observed w/ any trial or consistency. Mild Base of tongue/vallecular & pyriform sinus residue noted w/ all consistencies (> w/ larger bolus of thins) 2' reduced opening of the pharyngoesophageal segment (PES) + retrograde flow contributing (see esophageal phase). Residue did not increase w/ PO trials. No other significant pharyngeal weakness identified.   -RD       Esophageal Phase    Esophageal Phase  esophageal retention with retrograde flow through PES;other (see comments)  -RD    Esophageal Phase, Comment  Pt noted w/ reduced PES opening, prominent cricopharyngeus, as well as small osteophytes (confirmed per radiology PA) at the level of C5-C6 of the cervical spine that led to mild retention in the upper esophagus at the level of the osteophytes w/ minimal retention and retrograde flow to the pyrifrom sinuses. This contributed to pharyngeal residue. Pt noted w/ incr'd coughing/throat clearing at end of exam. Suspect that this is related to sensation of residue, but not aspiration. Education provided on general aspiration and reflux precautions, trialing smaller bolus size w/ PO, and also trialing chin tuck against resistance exercise to see if able to improve PES opening (taught pt who is able to complete independently). Pt may benefit from f/u w/ Gastroenterology to see if they can provide further help w/ esophageal phase of the swallow.   -RD       Clinical Impression    SLP Swallowing Diagnosis  mild pharyngoesophageal dysphagia  -RD    Functional Impact  no impact on function  -RD    Rehab Potential/Prognosis, Swallowing  good, to achieve stated therapy goals  -RD    Swallow Criteria for Skilled Therapeutic Interventions Met  no problems identified which  require skilled intervention  -RD       Recommendations    Therapy Frequency (Swallow)  evaluation only  -RD    SLP Diet Recommendation  regular textures;thin liquids  -RD    Recommended Diagnostics  reassess via VFSS (MBS);other (see comments) if any worsening of swallowing complaints  -RD    Recommended Precautions and Strategies  upright posture during/after eating;small bites of food and sips of liquid;general aspiration precautions;reflux precautions  -RD    Oral Care Recommendations  Oral Care BID/PRN  -RD    SLP Rec. for Method of Medication Administration  meds whole;with thin liquids;with pudding or applesauce;as tolerated  -RD    Monitor for Signs of Aspiration  yes;notify SLP if any concerns (f/u w/ outpatient SLP if further education needed- MD to order as appropriate)  -RD    Anticipated Discharge Disposition (SLP)  home  -RD    Demonstrates Need for Referral to Another Service  gastroenterology- per MD discretion  -RD      User Key  (r) = Recorded By, (t) = Taken By, (c) = Cosigned By    Initials Name Provider Type    Tsering Aguilar MS CCC-SLP Speech and Language Pathologist                        OP SLP Education     Row Name 05/03/21 1431       Education    Barriers to Learning  No barriers identified  -RD    Assessed  Learning needs;Learning motivation;Learning preferences;Learning readiness  -RD    Learning Motivation  Strong  -RD    Learning Method  Explanation;Teach back;Demonstration  -RD    Teaching Response  Verbalized understanding  -RD      User Key  (r) = Recorded By, (t) = Taken By, (c) = Cosigned By    Initials Name Effective Dates    Tsering Aguilar MS CCC-SLP 04/03/18 -                      Time Calculation:   SLP Start Time: 1020  Untimed Charges  SLP Eval/Re-eval : ST Motion Fluoro Eval Swallow - 70596  50200-DR Motion Fluoro Eval Swallow Minutes: 70  Total Minutes  Untimed Charges Total Minutes: 70   Total Minutes: 70    Therapy Charges for Today     Code Description  Service Date Service Provider Modifiers Qty    91565419669 HC ST MOTION FLUORO EVAL SWALLOW 5 5/3/2021 Tsering Bee, MS CCC-SLP GN 1            Patient was not wearing a face mask and did exhibit coughing during this therapy encounter.  Procedure performed was aerosolizing, involved close contact (within 6 feet for at least 15 minutes or longer), and did not involve contact with infectious secretions or specimens.  Therapist used appropriate personal protective equipment including gloves, standard procedure mask and eye protection.  Appropriate PPE was worn during the entire therapy session.  Hand hygiene was completed before and after therapy session.         Tsering Bee MS CCC-SLP  5/3/2021

## 2021-05-24 ENCOUNTER — OFFICE VISIT (OUTPATIENT)
Dept: ORTHOPEDIC SURGERY | Facility: CLINIC | Age: 76
End: 2021-05-24

## 2021-05-24 VITALS
BODY MASS INDEX: 27 KG/M2 | HEART RATE: 71 BPM | HEIGHT: 66 IN | SYSTOLIC BLOOD PRESSURE: 131 MMHG | WEIGHT: 168 LBS | DIASTOLIC BLOOD PRESSURE: 68 MMHG

## 2021-05-24 DIAGNOSIS — Z96.652 STATUS POST TOTAL KNEE REPLACEMENT, LEFT: Primary | ICD-10-CM

## 2021-05-24 DIAGNOSIS — Z09 POSTOPERATIVE EXAMINATION: ICD-10-CM

## 2021-05-24 PROCEDURE — 99212 OFFICE O/P EST SF 10 MIN: CPT | Performed by: ORTHOPAEDIC SURGERY

## 2021-05-24 RX ORDER — LEVOCETIRIZINE DIHYDROCHLORIDE 5 MG/1
5 TABLET, FILM COATED ORAL EVERY EVENING
COMMUNITY

## 2021-05-24 NOTE — PROGRESS NOTES
[facility]    Subjective     Chief Complaint   Patient presents with   • Follow-up     2  month recheck - JONO (L) TKA 08/11/2020 -  s/p (L) TKA 05/21/2020        HPI    Asiya Cleaning is a 75 y.o. female who follows up for her left total knee arthroplasty.     She is now 1 year and 3 days out from surgery. She states that her knee has improved since her last visit with therapy.  She believes that the scar tissue inhibits her range of motion. She reports that she is 90 percent better than she was prior to surgery secondary to getting her mobility back. She states that she has pain with flexion and when descending stairs.    I have reviewed the following portions of the patient's history and agree with: History of Present Illness and Review of Systems    Patient Active Problem List   Diagnosis   • Primary osteoarthritis of left knee   • Cough   • Seasonal allergic rhinitis due to pollen   • S/P total knee arthroplasty, left   • Depression   • Status post surgery   • Status post total knee replacement, left     Past Medical History:   Diagnosis Date   • Allergic rhinitis    • Baker's cyst of knee, left    • Bronchitis    • Depression    • Hearing loss     LEFT EAR NO AIDS   • Influenza    • Macular hole     HX OF   • Osteoarthritis    • PNA (pneumonia)    • Seasonal allergies    • Wears glasses       Past Surgical History:   Procedure Laterality Date   • CATARACT EXTRACTION Left 2010   • COLONOSCOPY  2008    clear   • DENTAL PROCEDURE      extraction   • DENTAL PROCEDURE      IMPLANT   • KNEE JOINT MANIPULATION Left 8/11/2020    Procedure: KNEE MANIPULATION LEFT;  Surgeon: Hadley Gibson MD;  Location:  [facility] OR;  Service: Orthopedics;  Laterality: Left;   • TOTAL KNEE ARTHROPLASTY Left 5/21/2020    Procedure: TOTAL KNEE ARTHROPLASTY LEFT;  Surgeon: Hadley Gibson MD;  Location:  [facility] OR;  Service: Orthopedics;  Laterality: Left;   • VITRECTOMY PARS PLANA W/ REPAIR OF MACULAR  [facility] Goss      Family History   Problem Relation Age of Onset   • Osteoarthritis Mother    • Pneumonia Mother    • Dementia Father      Social History     Socioeconomic History   • Marital status:      Spouse name: Not on file   • Number of children: 2   • Years of education: Not on file   • Highest education level: Not on file   Tobacco Use   • Smoking status: Never Smoker   • Smokeless tobacco: Never Used   Substance and Sexual Activity   • Alcohol use: Never   • Drug use: Never   • Sexual activity: Defer      Current Outpatient Medications on File Prior to Visit   Medication Sig Dispense Refill   • acetaminophen (TYLENOL) 325 MG tablet Take 650 mg by mouth Every 6 (Six) Hours As Needed for Mild Pain .     • amLODIPine (NORVASC) 5 MG tablet      • CBD (cannabidiol) oral oil Take 1 mL by mouth Daily.     • docusate sodium (Colace) 100 MG capsule Take 1 capsule by mouth 2 (Two) Times a Day. 40 capsule 0   • fluticasone (FLONASE) 50 MCG/ACT nasal spray 2 sprays into the nostril(s) as directed by provider Daily.     • ibuprofen (ADVIL,MOTRIN) 200 MG tablet Take 3 tablets by mouth 3 (Three) Times a Day With Meals.     • levocetirizine (XYZAL) 5 MG tablet Take 5 mg by mouth Every Evening.     • LUTEIN PO Take  by mouth.     • melatonin 5 MG tablet tablet Take 10 mg by mouth Every Night.     • Multiple Vitamins-Minerals (MULTIVITAMIN ADULT PO) Take  by mouth.     • vitamin C (ASCORBIC ACID) 500 MG tablet Take 1,000 mg by mouth Daily.     • [DISCONTINUED] cetirizine (zyrTEC) 10 MG tablet Take 10 mg by mouth Daily.     • [DISCONTINUED] diphenhydrAMINE (BENADRYL) 25 mg capsule Take 25 mg by mouth At Night As Needed for Allergies.     • [DISCONTINUED] montelukast (SINGULAIR) 10 MG tablet Take 1 tablet by mouth Every Night. 30 tablet 11   • [DISCONTINUED] oxyCODONE (Roxicodone) 5 MG immediate release tablet Take 1 tablet by mouth Every 4 (Four) Hours As Needed for Moderate Pain . 25 tablet 0     No current  facility-administered medications on file prior to visit.      Allergies   Allergen Reactions   • Penicillins Itching   • Doxycycline Rash        Review of Systems   Constitutional: Negative for activity change, appetite change, chills, diaphoresis, fatigue, fever and unexpected weight change.   HENT: Negative for congestion, dental problem, drooling, ear discharge, ear pain, facial swelling, hearing loss, mouth sores, nosebleeds, postnasal drip, rhinorrhea, sinus pressure, sneezing, sore throat, tinnitus, trouble swallowing and voice change.    Eyes: Negative for photophobia, pain, discharge, redness, itching and visual disturbance.   Respiratory: Negative for apnea, cough, choking, chest tightness, shortness of breath, wheezing and stridor.    Cardiovascular: Positive for leg swelling. Negative for chest pain and palpitations.   Gastrointestinal: Negative for abdominal distention, abdominal pain, anal bleeding, blood in stool, constipation, diarrhea, nausea, rectal pain and vomiting.   Endocrine: Negative for cold intolerance, heat intolerance, polydipsia, polyphagia and polyuria.   Genitourinary: Negative for decreased urine volume, difficulty urinating, dysuria, enuresis, flank pain, frequency, genital sores, hematuria and urgency.   Musculoskeletal: Positive for arthralgias. Negative for back pain, gait problem, myalgias, neck pain and neck stiffness.   Skin: Negative for color change, pallor, rash and wound.   Allergic/Immunologic: Negative for environmental allergies, food allergies and immunocompromised state.   Neurological: Negative for dizziness, tremors, seizures, syncope, facial asymmetry, speech difficulty, weakness, light-headedness, numbness and headaches.   Hematological: Negative for adenopathy. Does not bruise/bleed easily.   Psychiatric/Behavioral: Negative for agitation, behavioral problems, confusion, decreased concentration, dysphoric mood, hallucinations, self-injury, sleep disturbance and  "suicidal ideas. The patient is not nervous/anxious and is not hyperactive.         Objective      Physical Exam  /68   Pulse 71   Ht 167.6 cm (65.98\")   Wt 76.2 kg (168 lb)   LMP  (LMP Unknown)   BMI 27.13 kg/m²     Body mass index is 27.13 kg/m².    General:   Mental Status:  Alert   Appearance: Cooperative, in no acute distress   Build and Nutrition: Well-nourished well-developed female   Orientation: Alert and oriented to person, place and time   Posture: Normal   Gait: Nonantalgic    Integument  • Left knee: Wound is well-healed with no signs of infection    Lower Extremities  • Left Knee:  • Tenderness: None  • Swelling: None  • Effusion: None  • Crepitus: None  • Atrophy: None  • Range of motion:  • Extension: 0°  • Flexion: 120°  • Instability: No varus or valgus laxity. Negative anterior drawer.  • Deformities: None    Imaging/Studies  Imaging Results (Last 24 Hours)     ** No results found for the last 24 hours. **            Assessment and Plan     Diagnoses and all orders for this visit:    1. Status post total knee replacement, left (Primary)    2. Postoperative examination        1. Status post total knee replacement, left    2. Postoperative examination        I reviewed my findings with the patient today.  Overall her left total knee arthroplasty is doing well.  I will see her back in 6 months with a repeat x-ray, but sooner for any problems.      Return in about 6 months (around 11/24/2021) for Recheck with X-Rays.      Scribed for Hadley Gibson MD by Guy Davis.  05/24/21   16:51 EDT    I have personally performed the services described in this document as scribed by the above individual, and it is both accurate and complete.  Hadley Gibson MD  5/25/2021  06:00 EDT     "

## 2021-07-09 ENCOUNTER — OFFICE VISIT (OUTPATIENT)
Dept: ORTHOPEDIC SURGERY | Facility: CLINIC | Age: 76
End: 2021-07-09

## 2021-07-09 VITALS
WEIGHT: 172 LBS | HEIGHT: 66 IN | SYSTOLIC BLOOD PRESSURE: 159 MMHG | DIASTOLIC BLOOD PRESSURE: 82 MMHG | HEART RATE: 67 BPM | BODY MASS INDEX: 27.64 KG/M2

## 2021-07-09 DIAGNOSIS — M79.671 FOOT PAIN, RIGHT: Primary | ICD-10-CM

## 2021-07-09 DIAGNOSIS — M19.071 ARTHRITIS OF FOOT, RIGHT: ICD-10-CM

## 2021-07-09 DIAGNOSIS — I87.8 VENOUS STASIS: ICD-10-CM

## 2021-07-09 PROCEDURE — 99213 OFFICE O/P EST LOW 20 MIN: CPT | Performed by: ORTHOPAEDIC SURGERY

## 2021-07-09 NOTE — PROGRESS NOTES
NEW PATIENT    Patient: Asiya Cleaning  : 1945    Primary Care Provider: Arhtur Winston MD    Requesting Provider: As above    Pain of the Right Foot      History    Chief Complaint: Right foot pain, ankle swelling    History of Present Illness: This is an extremely pleasant 76-year-old woman who is active.  She owns a cleaning service.  Over the past year she has developed pain in the right midfoot, and swelling in the right ankle.  She has had a left total knee replacement.  She reports that she has done very well for a long time but suddenly in the past year began to have arthritic pain in many areas.  She has noted some arthritis in her thumbs.  She has had a left total knee replacement.  She used pen said for the knee and that helped.  She has not tried it on her foot.  She has more pain with activity, more pain if shoes press on the dorsum of the right foot.  She rates the pain as 3-5 out of 10.  She reports that she really wants to know what is causing it.    Current Outpatient Medications on File Prior to Visit   Medication Sig Dispense Refill   • acetaminophen (TYLENOL) 325 MG tablet Take 650 mg by mouth Every 6 (Six) Hours As Needed for Mild Pain .     • amLODIPine (NORVASC) 5 MG tablet      • CBD (cannabidiol) oral oil Take 1 mL by mouth Daily.     • docusate sodium (Colace) 100 MG capsule Take 1 capsule by mouth 2 (Two) Times a Day. 40 capsule 0   • fluticasone (FLONASE) 50 MCG/ACT nasal spray 2 sprays into the nostril(s) as directed by provider Daily.     • ibuprofen (ADVIL,MOTRIN) 200 MG tablet Take 3 tablets by mouth 3 (Three) Times a Day With Meals.     • levocetirizine (XYZAL) 5 MG tablet Take 5 mg by mouth Every Evening.     • LUTEIN PO Take  by mouth.     • melatonin 5 MG tablet tablet Take 10 mg by mouth Every Night.     • Multiple Vitamins-Minerals (MULTIVITAMIN ADULT PO) Take  by mouth.     • vitamin C (ASCORBIC ACID) 500 MG tablet Take 1,000 mg by mouth Daily.       No  current facility-administered medications on file prior to visit.      Allergies   Allergen Reactions   • Penicillins Itching   • Doxycycline Rash      Past Medical History:   Diagnosis Date   • Allergic rhinitis    • Baker's cyst of knee, left    • Bronchitis    • Depression    • Hearing loss     LEFT EAR NO AIDS   • Influenza    • Macular hole     HX OF   • Osteoarthritis    • PNA (pneumonia)    • Seasonal allergies    • Wears glasses      Past Surgical History:   Procedure Laterality Date   • CATARACT EXTRACTION Left 2010   • COLONOSCOPY  2008    clear   • DENTAL PROCEDURE      extraction   • DENTAL PROCEDURE      IMPLANT   • KNEE JOINT MANIPULATION Left 8/11/2020    Procedure: KNEE MANIPULATION LEFT;  Surgeon: Hadley Gibson MD;  Location:  [facility] OR;  Service: Orthopedics;  Laterality: Left;   • TOTAL KNEE ARTHROPLASTY Left 5/21/2020    Procedure: TOTAL KNEE ARTHROPLASTY LEFT;  Surgeon: Hadley Gibson MD;  Location:  [facility] OR;  Service: Orthopedics;  Laterality: Left;   • VITRECTOMY PARS PLANA W/ REPAIR OF MACULAR HOLE  2009     Family History   Problem Relation Age of Onset   • Osteoarthritis Mother    • Pneumonia Mother    • Dementia Father       Social History     Socioeconomic History   • Marital status:      Spouse name: Not on file   • Number of children: 2   • Years of education: Not on file   • Highest education level: Not on file   Tobacco Use   • Smoking status: Never Smoker   • Smokeless tobacco: Never Used   Substance and Sexual Activity   • Alcohol use: Never   • Drug use: Never   • Sexual activity: Defer        Review of Systems   Constitutional: Negative.    HENT: Negative.    Eyes: Negative.    Respiratory: Negative.    Cardiovascular: Negative.    Gastrointestinal: Negative.    Endocrine: Negative.    Genitourinary: Negative.    Musculoskeletal: Positive for arthralgias.   Skin: Negative.    Allergic/Immunologic: Positive for environmental allergies.   Neurological: Negative.   "  Hematological: Negative.    Psychiatric/Behavioral: Negative.        The following portions of the patient's history were reviewed and updated as appropriate: allergies, current medications, past family history, past medical history, past social history, past surgical history and problem list.    Physical Exam:   /82   Pulse 67   Ht 167.6 cm (65.98\")   Wt 78 kg (172 lb)   LMP  (LMP Unknown)   BMI 27.77 kg/m²   GENERAL: Body habitus: overweight    Lower extremity edema: Right: 1+ pitting; Left: 1+ pitting    Varicose veins:  Right: mild; Left: mild    Gait: antalgic with the first few steps     Mental Status:  awake and alert; oriented to person, place, and time    Voice:  clear  SKIN:  Lower extremity: Normal    Hair Growth(lower extremity):  Right:normal; Left:  normal  NAILS: Toenails: normal  HEENT: Head: Normocephalic, atraumatic,  without obvious abnormality.  eye: normal external eye, no icterus  ears:normal external ears  PULM:  Repiratory effort normal  CV:  Dorsalis Pedis:  Right: 2+; Left:2+    Posterior Tibial: Right:2+; Left:2+    Capillary Refill:  Brisk  MSK:  Hand:mild arthritis, CMC      Tibia:  Right:  non tender; Left:  non tender      Ankle:  Right: No tenderness in the ankle itself, normal range of motion, no effusion; Left:  non tender      Foot:  Right:  Tender over the second through fourth tarsometatarsal joints, moderate osteophytes; Left:  Smaller osteophytes over the tarsometatarsal joints, only slightly tender      NEURO:      [place] 5.07 monofilament test: normal    Lower extremity sensation: intact         Motor Function: all 5/5         Medical Decision Making    Data Review:   ordered and reviewed x-rays today    Assessment and Plan/ Diagnosis/Treatment options:   1. Arthritis of foot, right  She has arthritis in the tarsometatarsal joints.  I explained this to her.  I explained the loss of cartilage, explained the osteophytes.  I explained how pressure from the " tongue of the shoe can increase the symptoms.  That is why she has some radiating pain proximal and distal, because the nerves run directly over the inflamed joints.  I showed her a different way to lace her shoes.  I explained how custom orthotics will help.  I explained there are 28 different joints in the foot and she has arthritis in the central 3 joints.  She was relieved to know that it was not something more mysterious or a stress fracture.  We talked about staying light weight.  We went over everything in detail.  I recommend she try Pennsaid or topical Voltaren gel.  If the pain worsens an injection might be helpful.  I explained that surgery for fusion is a very last resort.  I will be happy to see her anytime.    2. Venous stasis  I explained edema and venous stasis to the patient, and the often hereditary nature of the problem, and the contribution of weight, trauma, etc. I explained how they cause edema (due to gravity, etc) I explained how they can lead to ulceration.  I explained how they can cause pain, stiffness, aching, cramping, etc. I explained that it is a very very common problem, so common that even [employer] markets compression stockings.  I recommend wearing support stockings (compression stockings) daily, we discussed what type and where to find them                    Radiology Ordered []  Radiology Reports Reviewed []      Radiology Images Reviewed []   Labs Reviewed []    Labs Ordered []   PCP Records Reviewed []    Provider Records Reviewed []    ER Records Reviewed []    Hospital Records Reviewed []    History Obtained From Family []    Phone conversation with Provider []    Records Requested []        Jennifer Lane MD

## 2021-07-30 DIAGNOSIS — Z01.812 BLOOD TESTS PRIOR TO TREATMENT OR PROCEDURE: Primary | ICD-10-CM

## 2021-09-01 ENCOUNTER — CLINICAL SUPPORT NO REQUIREMENTS (OUTPATIENT)
Dept: PULMONOLOGY | Facility: CLINIC | Age: 76
End: 2021-09-01

## 2021-09-01 DIAGNOSIS — Z01.812 BLOOD TESTS PRIOR TO TREATMENT OR PROCEDURE: Primary | ICD-10-CM

## 2021-09-01 PROCEDURE — 99211 OFF/OP EST MAY X REQ PHY/QHP: CPT | Performed by: INTERNAL MEDICINE

## 2021-09-01 PROCEDURE — U0004 COV-19 TEST NON-CDC HGH THRU: HCPCS | Performed by: INTERNAL MEDICINE

## 2021-09-02 LAB — SARS-COV-2 RNA NOSE QL NAA+PROBE: NOT DETECTED

## 2021-09-03 ENCOUNTER — OFFICE VISIT (OUTPATIENT)
Dept: PULMONOLOGY | Facility: CLINIC | Age: 76
End: 2021-09-03

## 2021-09-03 VITALS
WEIGHT: 172.6 LBS | OXYGEN SATURATION: 98 % | SYSTOLIC BLOOD PRESSURE: 142 MMHG | HEIGHT: 65 IN | BODY MASS INDEX: 28.76 KG/M2 | HEART RATE: 78 BPM | DIASTOLIC BLOOD PRESSURE: 66 MMHG | TEMPERATURE: 97.3 F

## 2021-09-03 DIAGNOSIS — J30.1 SEASONAL ALLERGIC RHINITIS DUE TO POLLEN: ICD-10-CM

## 2021-09-03 DIAGNOSIS — K21.9 GERD WITHOUT ESOPHAGITIS: ICD-10-CM

## 2021-09-03 DIAGNOSIS — R05.9 COUGH: Primary | ICD-10-CM

## 2021-09-03 PROCEDURE — 94726 PLETHYSMOGRAPHY LUNG VOLUMES: CPT | Performed by: INTERNAL MEDICINE

## 2021-09-03 PROCEDURE — 99213 OFFICE O/P EST LOW 20 MIN: CPT | Performed by: INTERNAL MEDICINE

## 2021-09-03 PROCEDURE — 94375 RESPIRATORY FLOW VOLUME LOOP: CPT | Performed by: INTERNAL MEDICINE

## 2021-09-03 PROCEDURE — 94729 DIFFUSING CAPACITY: CPT | Performed by: INTERNAL MEDICINE

## 2021-09-03 RX ORDER — PANTOPRAZOLE SODIUM 40 MG/1
40 TABLET, DELAYED RELEASE ORAL DAILY
Qty: 30 TABLET | Refills: 12 | Status: SHIPPED | OUTPATIENT
Start: 2021-09-03

## 2021-09-03 NOTE — PROGRESS NOTES
Asiya Cleaning is a 76 y.o. female here for evaluation of   Chief Complaint   Patient presents with   • Cough       Problem list:  1. Persistent cough  2. Seasonal allergies  3. GERD  4. Influenza a, January 18, 2020  5. Osteoarthritis left knee, right foot  6. Mild esophagopharyngeal dysphagia, modified barium swallow, 2020  7. Baker's cyst left knee  8. Left cataract extraction, 2010  9. Repair of macular hole, 2009  10. Colonoscopy, 2008, normal  11. Left total knee replacement May 21st, 2020  12. Dental extraction  13. Childbirth x2  14. Allergies, doxycycline-rash, penicillin-itching  15. No tobacco, no alcohol    History of Present Illness    76-year-old woman, non-smoker evaluated by me for a persistent cough in May 2020.  Her spirometry was normal, total lung capacity normal and diffusion capacity normal.  She was having significant postnasal drip and was restarted on her immunotherapy.  She did not have wheezing on examination.  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  I added Singulair 10 mg at night and continued her antihistamines, nasal steroids.  I did clear her for anesthesia for her total knee replacement.  This was done on May 21, 2020.  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  She then resumed her physical therapy and her pain went significantly better.  However she had to restart physical therapy after DC she developed some increased pain particularly going downstairs.  She developed some choking upon swallowing and underwent a modified barium swallow exam May 3, 2021.  She had mild pharyngeal esophageal dysphagia with penetration of thin liquids during swallowing but no aspiration below the cords.  She also had some esophageal retention with retrograde flow.  Regular texture with thin liquids recommended with upright posture, small bites, small sips.  GI evaluation also  recommended.    She then saw Dr. Lay for right foot pain.  Now her cough is unchanged. She continues to have drainage.  She did change to Xyzal. She is not using nasal steroids. Stopped singular.  She continues allergy shots.   Food still hangs and she is clearing her throat often. She is getting more hoarse. She stopped the Breo and no wheezing.  Cough is worse after drinks something cold. Cough is productive.     Review of Systems   HENT: Positive for congestion, postnasal drip and trouble swallowing.    Respiratory: Positive for cough and chest tightness. Negative for wheezing.    Cardiovascular: Negative for chest pain.   Musculoskeletal: Positive for arthralgias.         Current Outpatient Medications:   •  acetaminophen (TYLENOL) 325 MG tablet, Take 650 mg by mouth Every 6 (Six) Hours As Needed for Mild Pain ., Disp: , Rfl:   •  Cholecalciferol 50 MCG (2000 UT) capsule, Take 2,000 Units by mouth., Disp: , Rfl:   •  fluticasone (FLONASE) 50 MCG/ACT nasal spray, 2 sprays into the nostril(s) as directed by provider Daily., Disp: , Rfl:   •  ibuprofen (ADVIL,MOTRIN) 200 MG tablet, Take 3 tablets by mouth 3 (Three) Times a Day With Meals., Disp: , Rfl:   •  levocetirizine (XYZAL) 5 MG tablet, Take 5 mg by mouth Every Evening., Disp: , Rfl:   •  LUTEIN PO, Take  by mouth., Disp: , Rfl:   •  melatonin 5 MG tablet tablet, Take 10 mg by mouth Every Night., Disp: , Rfl:   •  Multiple Vitamins-Minerals (MULTIVITAMIN ADULT PO), Take  by mouth., Disp: , Rfl:   •  vitamin C (ASCORBIC ACID) 500 MG tablet, Take 1,000 mg by mouth Daily., Disp: , Rfl:   •  amLODIPine (NORVASC) 5 MG tablet, , Disp: , Rfl:   •  CBD (cannabidiol) oral oil, Take 1 mL by mouth Daily., Disp: , Rfl:   •  docusate sodium (Colace) 100 MG capsule, Take 1 capsule by mouth 2 (Two) Times a Day., Disp: 40 capsule, Rfl: 0    Past Medical History:   Diagnosis Date   • Allergic rhinitis    • Baker's cyst of knee, left    • Bronchitis    • Depression    •  "Hearing loss     LEFT EAR NO AIDS   • Influenza    • Macular hole     HX OF   • Osteoarthritis    • PNA (pneumonia)    • Seasonal allergies    • Wears glasses      Past Surgical History:   Procedure Laterality Date   • CATARACT EXTRACTION Left 2010   • COLONOSCOPY  2008    clear   • DENTAL PROCEDURE      extraction   • DENTAL PROCEDURE      IMPLANT   • KNEE JOINT MANIPULATION Left 8/11/2020    Procedure: KNEE MANIPULATION LEFT;  Surgeon: Hadley Gibson MD;  Location: [facility] OR;  Service: Orthopedics;  Laterality: Left;   • REPLACEMENT TOTAL KNEE     • TOTAL KNEE ARTHROPLASTY Left 5/21/2020    Procedure: TOTAL KNEE ARTHROPLASTY LEFT;  Surgeon: Hadley Gibson MD;  Location:  [facility] OR;  Service: Orthopedics;  Laterality: Left;   • VITRECTOMY PARS PLANA W/ REPAIR OF MACULAR HOLE  2009     Social History     Socioeconomic History   • Marital status:      Spouse name: Not on file   • Number of children: 2   • Years of education: Not on file   • Highest education level: Not on file   Tobacco Use   • Smoking status: Never Smoker   • Smokeless tobacco: Never Used   Substance and Sexual Activity   • Alcohol use: Never   • Drug use: Never   • Sexual activity: Defer     Family History   Problem Relation Age of Onset   • Osteoarthritis Mother    • Pneumonia Mother    • Dementia Father      Blood pressure 142/66, pulse 78, temperature 97.3 °F (36.3 °C), temperature source Temporal, height 165.1 cm (65\"), weight 78.3 kg (172 lb 9.6 oz), SpO2 98 %,     Physical Exam  Constitutional:       Appearance: Normal appearance.   HENT:      Head: Normocephalic and atraumatic.      Nose: Congestion present.      Mouth/Throat:      Comments: pnd  Eyes:      Pupils: Pupils are equal, round, and reactive to light.   Cardiovascular:      Rate and Rhythm: Normal rate and regular rhythm.      Heart sounds: Normal heart sounds. No murmur heard.     Pulmonary:      Breath sounds: Normal breath sounds. No wheezing.   Abdominal:      " General: Bowel sounds are normal.      Palpations: Abdomen is soft.      Tenderness: There is no abdominal tenderness.   Musculoskeletal:      Right lower leg: No edema.      Left lower leg: No edema.   Skin:     General: Skin is warm and dry.   Neurological:      Mental Status: She is oriented to person, place, and time.   Psychiatric:         Mood and Affect: Mood normal.           PFTs:  September 3, 2021, FVC 3.80 L, 122%, FEV1 2.96 L, 126% ratio 78%, total lung capacity 5.44 L, 105%, diffusion capacity 22.6, 108%, normal study    May 11, 2020 FVC 3.70 L, 116%, FEV1 2.93 L, 122%, ratio 79%, total lung capacity 5.32 L, 102%, diffusion capacity 20.5, 94%, normal study.    Radiology:  9/3/21 CXR Accessory azygos lobe, Ca++ granulomas, no infiltrates or effusions.     Lab:  March 23, 2021, white count 4.5, hemoglobin 14.5, platelet count 194, 52% polys, 31% lymphocytes, 13% monocytes, 2% eosinophils, absolute eosinophil count 100, sed rate 13, C-reactive protein less than 0.3    Diagnoses and all orders for this visit:    1. Cough (Primary)  -     XR Chest PA & Lateral  -     Pulmonary Function Test    2. Seasonal allergic rhinitis due to pollen    3. GERD without esophagitis        Discussion:   76-year-old woman initially evaluated by me for chronic cough.  She has ongoing postnasal drip with congestion and drainage and reflux symptoms both of which can cause a chronic cough.  She underwent a barium swallow that revealed some mild dysphagia but more troublesome is retrograde flow.  I do clinically feel that she is refluxing and that Pepcid is not controlling her symptoms.  She may or may not have a stricture.  She clearly needs a diagnostic EGD.  In addition her nasal congestion and drainage remains uncontrolled despite her immunotherapy.  She saw her allergist who stopped all the medications that I had started.  Rather than restart things I think she should call them to see what they wish her to do for her  increasing symptoms.  She has normal expiratory flows and I have personally have never heard wheezing.  She stopped her inhalers and her cough has not changed.  Clinically I do not feel this is cough variant asthma.    Schedule her to see  gastroenterology for an endoscopy  Change Pepcid to Protonix  Reflux precautions, elevating the head of her bed, no eating or drinking within 2 hours of laying down, no tight belts, avoid caffeine and alcohol  I would not start any further inhaled corticosteroids or bronchodilators      Steff Matthews MD

## 2021-09-05 PROCEDURE — U0004 COV-19 TEST NON-CDC HGH THRU: HCPCS | Performed by: EMERGENCY MEDICINE

## 2021-09-06 ENCOUNTER — TELEPHONE (OUTPATIENT)
Dept: URGENT CARE | Facility: CLINIC | Age: 76
End: 2021-09-06

## 2021-09-07 DIAGNOSIS — R13.10 DYSPHAGIA, UNSPECIFIED TYPE: Primary | ICD-10-CM

## 2021-09-07 DIAGNOSIS — K21.9 GERD WITHOUT ESOPHAGITIS: ICD-10-CM

## 2021-11-29 ENCOUNTER — OFFICE VISIT (OUTPATIENT)
Dept: ORTHOPEDIC SURGERY | Facility: CLINIC | Age: 76
End: 2021-11-29

## 2021-11-29 DIAGNOSIS — M16.12 PRIMARY OSTEOARTHRITIS OF LEFT HIP: ICD-10-CM

## 2021-11-29 DIAGNOSIS — Z96.652 STATUS POST TOTAL KNEE REPLACEMENT, LEFT: Primary | ICD-10-CM

## 2021-11-29 DIAGNOSIS — Z09 POSTOPERATIVE EXAMINATION: ICD-10-CM

## 2021-11-29 PROCEDURE — 99213 OFFICE O/P EST LOW 20 MIN: CPT | Performed by: ORTHOPAEDIC SURGERY

## 2021-11-29 RX ORDER — AMITRIPTYLINE HYDROCHLORIDE 25 MG/1
TABLET, FILM COATED ORAL
COMMUNITY
Start: 2021-10-30

## 2021-11-29 NOTE — PROGRESS NOTES
[facility] Note    Subjective     Chief Complaint   Patient presents with   • Follow-up     6 month recheck - JONO (L) TKA 08/11/2020 -  s/p (L) TKA 05/21/2020        HPI    It has been 6  month(s) since Ms. Cleaning's last visit. She returns to clinic today for postoperative follow-up of left knee arthroplasty. The issue has been ongoing for 18 month(s). She rates her pain a 2/10 on the pain scale. Previous/current treatments: NSAIDS and physical therapy. Current symptoms: same as prior visit. The pain is worse with walking, standing, sitting, climbing stairs, sleeping, working, leisure, lying on affected side, rising from seated position and any movement of the joint; resting improve the pain. Overall, she is doing worse.  She has numbness in the left lower extremity, that starts from her back and radiates down into the leg.  Overall, her knee has become stiffer over time, with regards to flexion.  No fevers, chills or night sweats.    I have reviewed the following portions of the patient's history and agree with: History of Present Illness and Review of Systems    Patient Active Problem List   Diagnosis   • Primary osteoarthritis of left knee   • Cough   • Seasonal allergic rhinitis due to pollen   • S/P total knee arthroplasty, left   • Depression   • Status post surgery   • Status post total knee replacement, left   • GERD without esophagitis     Past Medical History:   Diagnosis Date   • Allergic rhinitis    • Baker's cyst of knee, left    • Bronchitis    • Depression    • Hearing loss     LEFT EAR NO AIDS   • Influenza    • Macular hole     HX OF   • Osteoarthritis    • PNA (pneumonia)    • Seasonal allergies    • Wears glasses       Past Surgical History:   Procedure Laterality Date   • CATARACT EXTRACTION Left 2010   • COLONOSCOPY  2008    clear   • DENTAL PROCEDURE      extraction   • DENTAL PROCEDURE      IMPLANT   • KNEE JOINT MANIPULATION Left 8/11/2020    Procedure: KNEE  MANIPULATION LEFT;  Surgeon: Hadley Gibson MD;  Location:  [facility] OR;  Service: Orthopedics;  Laterality: Left;   • REPLACEMENT TOTAL KNEE     • TOTAL KNEE ARTHROPLASTY Left 5/21/2020    Procedure: TOTAL KNEE ARTHROPLASTY LEFT;  Surgeon: Hadley Gibson MD;  Location:  [facility] OR;  Service: Orthopedics;  Laterality: Left;   • VITRECTOMY PARS PLANA W/ REPAIR OF MACULAR HOLE  2009      Family History   Problem Relation Age of Onset   • Osteoarthritis Mother    • Pneumonia Mother    • Dementia Father      Social History     Socioeconomic History   • Marital status:    • Number of children: 2   Tobacco Use   • Smoking status: Never Smoker   • Smokeless tobacco: Never Used   Substance and Sexual Activity   • Alcohol use: Never   • Drug use: Never   • Sexual activity: Defer      Current Outpatient Medications on File Prior to Visit   Medication Sig Dispense Refill   • acetaminophen (TYLENOL) 325 MG tablet Take 650 mg by mouth Every 6 (Six) Hours As Needed for Mild Pain .     • amitriptyline (ELAVIL) 25 MG tablet      • amLODIPine (NORVASC) 5 MG tablet      • azithromycin (ZITHROMAX) 250 MG tablet Take 1 tablet by mouth Daily. Take 2 tablets the first day, then 1 tablet daily for 4 days. 6 tablet 0   • CBD (cannabidiol) oral oil Take 1 mL by mouth Daily.     • Cholecalciferol 50 MCG (2000 UT) capsule Take 2,000 Units by mouth.     • docusate sodium (Colace) 100 MG capsule Take 1 capsule by mouth 2 (Two) Times a Day. 40 capsule 0   • fluticasone (FLONASE) 50 MCG/ACT nasal spray 2 sprays into the nostril(s) as directed by provider Daily.     • ibuprofen (ADVIL,MOTRIN) 200 MG tablet Take 3 tablets by mouth 3 (Three) Times a Day With Meals.     • levocetirizine (XYZAL) 5 MG tablet Take 5 mg by mouth Every Evening.     • LUTEIN PO Take  by mouth.     • melatonin 5 MG tablet tablet Take 10 mg by mouth Every Night.     • Multiple Vitamins-Minerals (MULTIVITAMIN ADULT PO) Take  by mouth.     • pantoprazole (PROTONIX) 40 MG  EC tablet Take 1 tablet by mouth Daily. Patient is to stop pepcid 30 tablet 12   • vitamin C (ASCORBIC ACID) 500 MG tablet Take 1,000 mg by mouth Daily.       No current facility-administered medications on file prior to visit.      Allergies   Allergen Reactions   • Penicillins Itching   • Doxycycline Rash        Review of Systems   Constitutional: Negative for activity change, appetite change, chills, diaphoresis, fatigue, fever and unexpected weight change.   HENT: Negative for congestion, dental problem, drooling, ear discharge, ear pain, facial swelling, hearing loss, mouth sores, nosebleeds, postnasal drip, rhinorrhea, sinus pressure, sneezing, sore throat, tinnitus, trouble swallowing and voice change.    Eyes: Negative for photophobia, pain, discharge, redness, itching and visual disturbance.   Respiratory: Negative for apnea, cough, choking, chest tightness, shortness of breath, wheezing and stridor.    Cardiovascular: Negative for chest pain, palpitations and leg swelling.   Gastrointestinal: Negative for abdominal distention, abdominal pain, anal bleeding, blood in stool, constipation, diarrhea, nausea, rectal pain and vomiting.   Endocrine: Negative for cold intolerance, heat intolerance, polydipsia, polyphagia and polyuria.   Genitourinary: Negative for decreased urine volume, difficulty urinating, dysuria, enuresis, flank pain, frequency, genital sores, hematuria and urgency.   Musculoskeletal: Positive for arthralgias. Negative for back pain, gait problem, joint swelling, myalgias, neck pain and neck stiffness.   Skin: Negative for color change, pallor, rash and wound.   Allergic/Immunologic: Negative for environmental allergies, food allergies and immunocompromised state.   Neurological: Negative for dizziness, tremors, seizures, syncope, facial asymmetry, speech difficulty, weakness, light-headedness, numbness and headaches.   Hematological: Negative for adenopathy. Does not bruise/bleed easily.    Psychiatric/Behavioral: Negative for agitation, behavioral problems, confusion, decreased concentration, dysphoric mood, hallucinations, self-injury, sleep disturbance and suicidal ideas. The patient is not nervous/anxious and is not hyperactive.         Objective      Physical Exam  LMP  (LMP Unknown)     There is no height or weight on file to calculate BMI.    General:   Mental Status:  Alert   Appearance: Cooperative, in no acute distress   Build and Nutrition: Well-nourished well-developed female   Orientation: Alert and oriented to person, place and time   Posture: Normal   Gait: Nonantalgic    Integument:   Left knee: Wound is well-healed with no signs of infection    Lower Extremities:   Left Knee:    Tenderness:  None    Effusion:  None    Swelling: None    Crepitus:  None    Range of motion:  Extension: 0°       Flexion: 105°  Instability:  No varus laxity, no valgus laxity, negative anterior drawer  Deformities:  None  Positive Stinchfield on the left, with pain in the groin with internal rotation of the hip      Imaging/Studies  Imaging Results (Last 24 Hours)     Procedure Component Value Units Date/Time    XR Knee 3+ View With [place] Left [[id number]] Resulted: 11/29/21 0906     Updated: 11/29/21 0908    Narrative:      Left Knee Radiographs  Indication: status-post left total knee arthroplasty  Views: AP, lateral, and sunrise views of the left knee    Comparison: 3/22/2021    Findings:   The components are well aligned, with no signs of loosening or failure.            Assessment and Plan     Diagnoses and all orders for this visit:    1. Status post total knee replacement, left (Primary)  -     XR Knee 3+ View With [place] Left    2. Postoperative examination    3. Primary osteoarthritis of left hip  -     XR Hip With or Without Pelvis 2 - 3 View Left; Future  -     External Facility Surgical/Procedural Request; Future        1. Status post total knee replacement, left    2. Postoperative  examination    3. Primary osteoarthritis of left hip        Reviewed my findings with the patient.  She has residual left knee pain following her total knee arthroplasty.  I believe that there is a contributing factor from her hip, as well as possibly from her back.  I do not see anything obvious intrinsic to the knee.  For both diagnostic and therapeutic purposes, we discussed a trial of an intra-articular hip injection.  She would like to proceed.  We will schedule that at a mutually convenient time.  We will hold off on a spine evaluation at this time, but that may be a consideration in the future, and she is in agreement.    Return in about 4 weeks (around 12/27/2021) for After Hip Injection.      Hadley Gibson MD  11/29/21  09:44 EST

## 2021-12-08 NOTE — PROGRESS NOTES
Asiya Cleaning is a 76 y.o. female here for evaluation of   Chief Complaint   Patient presents with   • Cough       Problem list:  1. Persistent cough  2. Seasonal allergies  3. GERD  4. Influenza a, January 18, 2020  5. Osteoarthritis left knee, right foot  6. Mild esophagopharyngeal dysphagia, modified barium swallow, 2020  7. Baker's cyst left knee  8. Left cataract extraction, 2010  9. Repair of macular hole, 2009  10. Colonoscopy, 2008, normal  11. Left total knee replacement May 21st, 2020  12. Dental extraction  13. Childbirth x2  14. Allergies, doxycycline-rash, penicillin-itching  15. No tobacco, no alcohol    History of Present Illness    76-year-old woman, non-smoker evaluated by me for a persistent cough in May 2020.  Her spirometry was normal, total lung capacity normal and diffusion capacity normal.  She was having significant postnasal drip and was restarted on her immunotherapy.  She did not have wheezing on examination.  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  I added Singulair 10 mg at night and continued her antihistamines, nasal steroids.  I did clear her for anesthesia for her total knee replacement.  This was done on May 21, 2020.  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  She then resumed her physical therapy and her pain was significantly better.  However she had to restart physical therapy after DC she developed some increased pain particularly going downstairs.  She developed some choking upon swallowing and underwent a modified barium swallow exam May 3, 2021.  She had mild pharyngeal esophageal dysphagia with penetration of thin liquids during swallowing but no aspiration below the cords.  She also had some esophageal retention with retrograde flow.  Regular texture with thin liquids recommended with upright posture, small bites, small sips.  GI evaluated on 9/30/21.EGD  showed no esophagitis. He increased protonix to BID and added amytriptyline hs.       Review of Systems   HENT: Positive for congestion and postnasal drip.    Respiratory: Positive for cough.          Current Outpatient Medications:   •  acetaminophen (TYLENOL) 325 MG tablet, Take 650 mg by mouth Every 6 (Six) Hours As Needed for Mild Pain ., Disp: , Rfl:   •  amitriptyline (ELAVIL) 25 MG tablet, , Disp: , Rfl:   •  Cholecalciferol 50 MCG (2000 UT) capsule, Take 2,000 Units by mouth., Disp: , Rfl:   •  fluticasone (FLONASE) 50 MCG/ACT nasal spray, 2 sprays into the nostril(s) as directed by provider Daily., Disp: , Rfl:   •  levocetirizine (XYZAL) 5 MG tablet, Take 5 mg by mouth Every Evening., Disp: , Rfl:   •  LUTEIN PO, Take  by mouth., Disp: , Rfl:   •  Multiple Vitamins-Minerals (MULTIVITAMIN ADULT PO), Take  by mouth., Disp: , Rfl:   •  pantoprazole (PROTONIX) 40 MG EC tablet, Take 1 tablet by mouth Daily. Patient is to stop pepcid (Patient taking differently: Take 40 mg by mouth 2 (Two) Times a Day. Patient is to stop pepcid), Disp: 30 tablet, Rfl: 12  •  vitamin C (ASCORBIC ACID) 500 MG tablet, Take 1,000 mg by mouth Daily., Disp: , Rfl:   •  amLODIPine (NORVASC) 5 MG tablet, , Disp: , Rfl:   •  azithromycin (ZITHROMAX) 250 MG tablet, Take 1 tablet by mouth Daily. Take 2 tablets the first day, then 1 tablet daily for 4 days., Disp: 6 tablet, Rfl: 0  •  CBD (cannabidiol) oral oil, Take 1 mL by mouth Daily., Disp: , Rfl:   •  docusate sodium (Colace) 100 MG capsule, Take 1 capsule by mouth 2 (Two) Times a Day., Disp: 40 capsule, Rfl: 0  •  ibuprofen (ADVIL,MOTRIN) 200 MG tablet, Take 3 tablets by mouth 3 (Three) Times a Day With Meals., Disp: , Rfl:   •  melatonin 5 MG tablet tablet, Take 10 mg by mouth Every Night., Disp: , Rfl:     Past Medical History:   Diagnosis Date   • Allergic rhinitis    • Baker's cyst of knee, left    • Bronchitis    • Depression    • Hearing loss     LEFT EAR NO AIDS   • Influenza   "  • Macular hole     HX OF   • Osteoarthritis    • PNA (pneumonia)    • Seasonal allergies    • Wears glasses      Past Surgical History:   Procedure Laterality Date   • CATARACT EXTRACTION Left 2010   • COLONOSCOPY  2008    clear   • DENTAL PROCEDURE      extraction   • DENTAL PROCEDURE      IMPLANT   • KNEE JOINT MANIPULATION Left 8/11/2020    Procedure: KNEE MANIPULATION LEFT;  Surgeon: Hadley Gibson MD;  Location:  [facility] OR;  Service: Orthopedics;  Laterality: Left;   • REPLACEMENT TOTAL KNEE     • TOTAL KNEE ARTHROPLASTY Left 5/21/2020    Procedure: TOTAL KNEE ARTHROPLASTY LEFT;  Surgeon: Hadley Gibson MD;  Location:  [facility] OR;  Service: Orthopedics;  Laterality: Left;   • VITRECTOMY PARS PLANA W/ REPAIR OF MACULAR HOLE  2009     Social History     Socioeconomic History   • Marital status:    • Number of children: 2   Tobacco Use   • Smoking status: Never Smoker   • Smokeless tobacco: Never Used   Vaping Use   • Vaping Use: Never used   Substance and Sexual Activity   • Alcohol use: Never   • Drug use: Never   • Sexual activity: Defer     Family History   Problem Relation Age of Onset   • Osteoarthritis Mother    • Pneumonia Mother    • Dementia Father      Blood pressure 142/66, pulse 78, temperature 97.3 °F (36.3 °C), temperature source Temporal, height 165.1 cm (65\"), weight 78.3 kg (172 lb 9.6 oz), SpO2 98 %,     Physical Exam  Constitutional:       Appearance: Normal appearance.   Cardiovascular:      Rate and Rhythm: Normal rate and regular rhythm.      Heart sounds: Normal heart sounds. No murmur heard.      Pulmonary:      Breath sounds: Normal breath sounds. No wheezing or rhonchi.           PFTs:  September 3, 2021, FVC 3.80 L, 122%, FEV1 2.96 L, 126% ratio 78%, total lung capacity 5.44 L, 105%, diffusion capacity 22.6, 108%, normal study    May 11, 2020 FVC 3.70 L, 116%, FEV1 2.93 L, 122%, ratio 79%, total lung capacity 5.32 L, 102%, diffusion capacity 20.5, 94%, normal study.  "   Radiology:  9/3/21 CXR Accessory azygos lobe, Ca++ granulomas, no infiltrates or effusions.     Lab:  March 23, 2021, white count 4.5, hemoglobin 14.5, platelet count 194, 52% polys, 31% lymphocytes, 13% monocytes, 2% eosinophils, absolute eosinophil count 100, sed rate 13, C-reactive protein less than 0.3    Diagnoses and all orders for this visit:    1. Cough (Primary)    2. Seasonal allergic rhinitis due to pollen    3. GERD without esophagitis        Discussion:   76-year-old woman initially evaluated by me for chronic cough.  She has postnasal drip as well as mild dysphagia and reflux both contributing to her cough.  She is a non-smoker and had normal flows on her pulmonary function test.  She has no evidence of interstitial lung disease or infiltrate on chest x-ray.  A trial of inhaled bronchodilators and inhaled corticosteroids did not change her cough and she stopped them. Finally, she had an EGD, and was placed on BID Protonix and amytriptyline for silent reflux.  Cough is markedly better. Will follow up as needed            Steff Matthews MD

## 2021-12-09 ENCOUNTER — OFFICE VISIT (OUTPATIENT)
Dept: PULMONOLOGY | Facility: CLINIC | Age: 76
End: 2021-12-09

## 2021-12-09 VITALS
BODY MASS INDEX: 29.09 KG/M2 | OXYGEN SATURATION: 97 % | HEIGHT: 66 IN | RESPIRATION RATE: 18 BRPM | HEART RATE: 88 BPM | WEIGHT: 181 LBS | SYSTOLIC BLOOD PRESSURE: 124 MMHG | TEMPERATURE: 97.7 F | DIASTOLIC BLOOD PRESSURE: 84 MMHG

## 2021-12-09 DIAGNOSIS — J30.1 SEASONAL ALLERGIC RHINITIS DUE TO POLLEN: ICD-10-CM

## 2021-12-09 DIAGNOSIS — K21.9 GERD WITHOUT ESOPHAGITIS: ICD-10-CM

## 2021-12-09 DIAGNOSIS — R05.9 COUGH: Primary | ICD-10-CM

## 2021-12-09 PROCEDURE — 99213 OFFICE O/P EST LOW 20 MIN: CPT | Performed by: INTERNAL MEDICINE

## 2021-12-10 ENCOUNTER — TELEPHONE (OUTPATIENT)
Dept: ORTHOPEDIC SURGERY | Facility: CLINIC | Age: 76
End: 2021-12-10

## 2024-02-26 ENCOUNTER — TELEPHONE (OUTPATIENT)
Dept: PULMONOLOGY | Facility: CLINIC | Age: 79
End: 2024-02-26
Payer: MEDICARE

## 2024-02-26 NOTE — TELEPHONE ENCOUNTER
Pt called  today requesting an apt due to having an abnormal Chest Xray from  and wanting to see if a Chest CT can be ordered and also a cough. Pt denies fever/chest pain/nausea/swelling/wheezing/sob. Pt transferred to [facility] to schedule an apt w/ Dr Yañez w/ FULL PFT/CHEST XRAY.

## 2024-03-04 ENCOUNTER — OFFICE VISIT (OUTPATIENT)
Dept: PULMONOLOGY | Facility: CLINIC | Age: 79
End: 2024-03-04
Payer: MEDICARE

## 2024-03-04 VITALS
WEIGHT: 179 LBS | OXYGEN SATURATION: 98 % | SYSTOLIC BLOOD PRESSURE: 128 MMHG | RESPIRATION RATE: 16 BRPM | DIASTOLIC BLOOD PRESSURE: 78 MMHG | HEART RATE: 81 BPM | TEMPERATURE: 98.2 F | BODY MASS INDEX: 28.77 KG/M2 | HEIGHT: 66 IN

## 2024-03-04 DIAGNOSIS — R05.9 COUGH, UNSPECIFIED TYPE: Primary | ICD-10-CM

## 2024-03-04 DIAGNOSIS — R93.89 ABNORMAL CXR: ICD-10-CM

## 2024-03-04 PROCEDURE — 1159F MED LIST DOCD IN RCRD: CPT | Performed by: NURSE PRACTITIONER

## 2024-03-04 PROCEDURE — 94729 DIFFUSING CAPACITY: CPT | Performed by: NURSE PRACTITIONER

## 2024-03-04 PROCEDURE — 99214 OFFICE O/P EST MOD 30 MIN: CPT | Performed by: NURSE PRACTITIONER

## 2024-03-04 PROCEDURE — 1160F RVW MEDS BY RX/DR IN RCRD: CPT | Performed by: NURSE PRACTITIONER

## 2024-03-04 PROCEDURE — 94726 PLETHYSMOGRAPHY LUNG VOLUMES: CPT | Performed by: NURSE PRACTITIONER

## 2024-03-04 PROCEDURE — 94375 RESPIRATORY FLOW VOLUME LOOP: CPT | Performed by: NURSE PRACTITIONER

## 2024-03-04 RX ORDER — FLUTICASONE PROPIONATE 50 MCG
2 SPRAY, SUSPENSION (ML) NASAL DAILY
Qty: 18 ML | Refills: 3 | Status: SHIPPED | OUTPATIENT
Start: 2024-03-04

## 2024-03-04 RX ORDER — DIPHENOXYLATE HYDROCHLORIDE AND ATROPINE SULFATE 2.5; .025 MG/1; MG/1
1 TABLET ORAL DAILY
COMMUNITY

## 2024-03-04 RX ORDER — LISINOPRIL 10 MG/1
10 TABLET ORAL DAILY
COMMUNITY

## 2024-03-04 RX ORDER — PANTOPRAZOLE SODIUM 40 MG/1
40 TABLET, DELAYED RELEASE ORAL 2 TIMES DAILY
COMMUNITY

## 2024-03-04 RX ORDER — BROMPHENIRAMINE MALEATE, PSEUDOEPHEDRINE HYDROCHLORIDE, AND DEXTROMETHORPHAN HYDROBROMIDE 2; 30; 10 MG/5ML; MG/5ML; MG/5ML
5 SYRUP ORAL 4 TIMES DAILY PRN
Qty: 473 ML | Refills: 0 | Status: SHIPPED | OUTPATIENT
Start: 2024-03-04 | End: 2024-03-04 | Stop reason: SDUPTHER

## 2024-03-04 RX ORDER — RIBOFLAVIN (VITAMIN B2) 100 MG
1000 TABLET ORAL DAILY
COMMUNITY

## 2024-03-04 RX ORDER — BROMPHENIRAMINE MALEATE, PSEUDOEPHEDRINE HYDROCHLORIDE, AND DEXTROMETHORPHAN HYDROBROMIDE 2; 30; 10 MG/5ML; MG/5ML; MG/5ML
5 SYRUP ORAL 4 TIMES DAILY PRN
Qty: 473 ML | Refills: 0 | Status: SHIPPED | OUTPATIENT
Start: 2024-03-04

## 2024-03-04 NOTE — PROGRESS NOTES
"[facility] Pulmonary Follow up      Chief Complaint  Cough (Teste positive for  covid on January 12.2024 and pt states cough has gotten worse over time. )    Subjective          Asiya Cleaning presents to [facility] for a persistent cough since she had COVID 19 in January.      She was seen here in the office in 2020 and 2021 by Dr. Matthews.  Her last visit was December 2021.  She was seeing her for a chronic cough.  She had quite a bit of postnasal drainage and some mild reflux contributing to her cough.  Her PFTs and chest x-ray were normal.  She was told to follow-up as needed.    At this time she feels like the cough is mostly a dry tickle but occasionally she will have a bit of sputum that is thick and just one \"blob\".  The cough is persistent throughout the day.  The cough does seem to be worse at night when she tries to lie down.  She is also having quite a bit of postnasal drainage.  She has been using some over-the-counter Mucinex as well as an antihistamine.  She sees an allergist regularly.    She does have a history of reflux but feels like it is well-controlled on her current PPI.    She also started on an ACE inhibitor 2 months ago.        Objective     Vital Signs:   /78   Pulse 81   Temp 98.2 °F (36.8 °C) (Infrared)   Resp 16   Ht 167.6 cm (66\")   Wt 81.2 kg (179 lb)   SpO2 98%   BMI 28.89 kg/m²       Immunization History   Administered Date(s) Administered    COVID-19 (PFIZER) Purple Cap Monovalent 08/20/2021, 09/17/2021       Physical Exam  Vitals reviewed.   Constitutional:       Appearance: She is well-developed.   HENT:      Head: Normocephalic and atraumatic.   Eyes:      Pupils: Pupils are equal, round, and reactive to light.   Cardiovascular:      Rate and Rhythm: Normal rate and regular rhythm.      Heart sounds: No murmur heard.  Pulmonary:      Effort: Pulmonary effort is normal. No respiratory distress.      Breath " sounds: Normal breath sounds. No wheezing or rales.   Abdominal:      General: Bowel sounds are normal. There is no distension.      Palpations: Abdomen is soft.   Musculoskeletal:         General: Normal range of motion.      Cervical back: Normal range of motion and neck supple.   Skin:     General: Skin is warm and dry.      Findings: No erythema.   Neurological:      Mental Status: She is alert and oriented to person, place, and time.   Psychiatric:         Behavior: Behavior normal.          Result Review :            Impression    No focal airspace consolidation.    Linear retrocardiac opacity is nonspecific. Recommend further evaluation with noncontrast chest CT.    TECHNIQUE:   XR CHEST 2 VIEWS     COMPARISON:   None     No focal airspace consolidation. Nonspecific asymmetric linear retrocardiac opacity overlying the posterior left 10th rib. Azygos fissure incidentally noted. No evidence of a pleural effusion or pneumothorax.  Cardiac and mediastinal silhouette are within normal limits. Degenerative changes of the thoracic spine.         PFTs done in the office today:  No obstruction or restriction FVC is 3.73, 132% predicted.  Normal DLCO.                 Assessment and Plan    Problem List Items Addressed This Visit          Pulmonary and Pneumonias    Cough - Primary    Relevant Orders    Spirometry with Diffusion Capacity & Lung Volumes (Completed)     Other Visit Diagnoses       Abnormal CXR        Relevant Orders    CT Chest Without Contrast          We did discuss her cough today.  It has been consistent since she had COVID-19.  We did discuss it may just be a chronic cough after COVID but I recommend therapeutic treatment.      Continue on her antihistamine daily for her postnasal drainage and resume her Flonase to see if we can dry up that drainage.  I will refill her cough syrup to see if we can help her airways rest some.    I gave her a sample of air supra to use for the next week or so and see  if it helps with the airway inflammation.  I gave her a spacer and instructions on how to use it today, as well as making sure she rinses her mouth out after each use.    She did have a chest x-ray done at  which showed a retrocardiac opacity, I will go ahead and follow-up on a CT scan of the chest just to complete the evaluation but this is likely just an old scar.      Follow Up     Return if symptoms worsen or fail to improve.  Patient was given instructions and counseling regarding her condition or for health maintenance advice. Please see specific information pulled into the AVS if appropriate.     I spent 36 minutes caring for Asiya on this date of service. This time includes time spent by me in the following activities:preparing for the visit, reviewing tests, obtaining and/or reviewing a separately obtained history, performing a medically appropriate examination and/or evaluation , counseling and educating the patient/family/caregiver, ordering medications, tests, or procedures, referring and communicating with other health care professionals , documenting information in the medical record, and independently interpreting results and communicating that information with the patient/family/caregiver    Excluding time spent on other separate services such as performing procedures or test interpretation, if applicable    Moderate level of Medical Decision Making complexity based on 2 or more chronic stable illnesses and prescription drug management.    JANET Hooper, ACNP  [facility] Critical [facility]  Electronically signed

## 2024-03-21 ENCOUNTER — OFFICE VISIT (OUTPATIENT)
Dept: PULMONOLOGY | Facility: CLINIC | Age: 79
End: 2024-03-21
Payer: MEDICARE

## 2024-03-21 VITALS
WEIGHT: 175 LBS | HEIGHT: 66 IN | DIASTOLIC BLOOD PRESSURE: 64 MMHG | SYSTOLIC BLOOD PRESSURE: 120 MMHG | OXYGEN SATURATION: 97 % | HEART RATE: 97 BPM | BODY MASS INDEX: 28.12 KG/M2 | TEMPERATURE: 98 F

## 2024-03-21 DIAGNOSIS — J30.1 SEASONAL ALLERGIC RHINITIS DUE TO POLLEN: ICD-10-CM

## 2024-03-21 DIAGNOSIS — U09.9 POST-COVID CHRONIC COUGH: ICD-10-CM

## 2024-03-21 DIAGNOSIS — R05.3 POST-COVID CHRONIC COUGH: ICD-10-CM

## 2024-03-21 DIAGNOSIS — R05.3 CHRONIC COUGH: Primary | ICD-10-CM

## 2024-03-21 DIAGNOSIS — K21.9 GERD WITHOUT ESOPHAGITIS: ICD-10-CM

## 2024-03-21 PROCEDURE — 99214 OFFICE O/P EST MOD 30 MIN: CPT | Performed by: NURSE PRACTITIONER

## 2024-03-21 PROCEDURE — 1160F RVW MEDS BY RX/DR IN RCRD: CPT | Performed by: NURSE PRACTITIONER

## 2024-03-21 PROCEDURE — 1159F MED LIST DOCD IN RCRD: CPT | Performed by: NURSE PRACTITIONER

## 2024-03-21 NOTE — PROGRESS NOTES
"[facility] Pulmonary Follow up      Chief Complaint  Cough (Follow up)    Subjective          Asiya Cleaning presents to [facility] for follow-up on testing.  I saw her a couple weeks ago for a persistent cough.  She had COVID-19 in January and since then she has been having a persistent cough.  The cough is mostly dry but occasionally she will have a blob of sputum.  Coughing episodes are random.  She has been taking her allergy medications and her Flonase which has not helped much.  On her last visit I did give her a sample of air supra, she has not noticed that it helped much either.    I did go ahead and follow-up on a CT scan for any further causes of the cough.      She also has a history of esophageal spurs almost 3 years ago.  At that time she was having a significant cough as well.  She does take a PPI and adhere to reflux precautions.  She has not noticed any worsening dysphagia episodes.        Objective     Vital Signs:   /64 (BP Location: Left arm, Patient Position: Sitting, Cuff Size: Adult)   Pulse 97   Temp 98 °F (36.7 °C)   Ht 167.6 cm (66\")   Wt 79.4 kg (175 lb)   SpO2 97% Comment: Room air at rest  BMI 28.25 kg/m²       Immunization History   Administered Date(s) Administered    COVID-19 (PFIZER) Purple Cap Monovalent 08/20/2021, 09/17/2021       Physical Exam  Vitals reviewed.   Constitutional:       Appearance: She is well-developed.   HENT:      Head: Normocephalic and atraumatic.   Eyes:      Pupils: Pupils are equal, round, and reactive to light.   Cardiovascular:      Rate and Rhythm: Normal rate and regular rhythm.      Heart sounds: No murmur heard.  Pulmonary:      Effort: Pulmonary effort is normal. No respiratory distress.      Breath sounds: Normal breath sounds. No wheezing or rales.   Abdominal:      General: Bowel sounds are normal. There is no distension.      Palpations: Abdomen is soft.   Musculoskeletal:         " General: Normal range of motion.      Cervical back: Normal range of motion and neck supple.   Skin:     General: Skin is warm and dry.      Findings: No erythema.   Neurological:      Mental Status: She is alert and oriented to person, place, and time.   Psychiatric:         Behavior: Behavior normal.          Result Review :            FINDINGS:   Mediastinum and Pleura: Mild coronary artery calcification. No mediastinal or hilar adenopathy. No pleural or pericardial effusions.     Lungs: No airspace opacities or suspicious pulmonary nodules.     Upper Abdomen: Several tiny low-density liver lesions, likely cysts, but not characterizable on this exam.     Musculoskeletal: No suspicious lytic or sclerotic lesion.                  Assessment and Plan    Problem List Items Addressed This Visit          Allergies and Adverse Reactions    Seasonal allergic rhinitis due to pollen       Gastrointestinal Abdominal     GERD without esophagitis       Pulmonary and Pneumonias    Cough - Primary       Other    Post-COVID chronic cough     We reviewed her CT scan with no significant findings as a cause to the cough.  Her PFTs have been normal.  We have tried some empiric therapy with a combination ICS/SABD with no success.  She is also using her allergy medicines.    I think with her history her neck step if the cough continues to be persistent is to follow-up with GI with her history of silent aspiration and esophageal spurs.  We also discussed following up with ENT for upper airway/vocal cord evaluation as a cause to her cough.    We also discussed post COVID cough, could be that this is just a lingering cough from her COVID-19 infection in January.    At this time she is going to wait a few more weeks and then follow-up with her primary care and may indeed do the GI follow-up.      Follow Up     Return if symptoms worsen or fail to improve.  Patient was given instructions and counseling regarding her condition or for health  maintenance advice. Please see specific information pulled into the AVS if appropriate.     I spent 34 minutes caring for Asiya on this date of service. This time includes time spent by me in the following activities:preparing for the visit, reviewing tests, obtaining and/or reviewing a separately obtained history, performing a medically appropriate examination and/or evaluation , counseling and educating the patient/family/caregiver, ordering medications, tests, or procedures, referring and communicating with other health care professionals , documenting information in the medical record, and independently interpreting results and communicating that information with the patient/family/caregiver    Excluding time spent on other separate services such as performing procedures or test interpretation, if applicable      JANET Hooper, ACNP  [facility] Pulmonary Critical Care Medicine  Electronically signed

## 2024-04-10 DIAGNOSIS — R13.19 ESOPHAGEAL DYSPHAGIA: Primary | ICD-10-CM

## 2024-04-26 NOTE — PROGRESS NOTES
Asiya Cleaning is a 78 y.o. female here for evaluation of   Chief Complaint   Patient presents with    Cough     F/U       Problem list:  Persistent cough  Seasonal allergies  GERD  Influenza a, January 18, 2020  Osteoarthritis left knee, right foot  Mild esophagopharyngeal dysphagia, modified barium swallow, 2020  Baker's cyst left knee  Left cataract extraction, 2010  Repair of macular hole, 2009  Colonoscopy, 2008, normal  Left total knee replacement May 21st, 2020  Dental extraction  Childbirth x2  Allergies, doxycycline-rash, penicillin-itching  No tobacco, no alcohol    History of Present Illness    78-year-old woman, non-smoker initially evaluated by me in May 2020 for chronic cough.  Pulmonary function test were normal.  She had evidence of postnasal drip and was restarted on her immunotherapy.  She also had some esophageal retention with retrograde flow on her swallow assessment.  EGD was done September 2021 with no esophagitis.  Her PPI was increased to twice daily and amitriptyline was ordered at night. Cough had improved. She did stop PPI when she became asymptomatic.   She was evaluated by Annalise March 21, 2024 after she had COVID-19 in January and was coughing persistently.  CT scan done for coronary calcification March 13 revealed no pulmonary infiltrates and only mild coronary artery calcification. Repeat CT chest was clear. She does have allergic rhinitis with drainage. Cough is awakening her from sleep. It is intermittently productive of clear secretions.  She restarted protonix 2 months ago BID dosing.     Review of Systems   HENT:  Positive for congestion and postnasal drip.    Respiratory:  Positive for cough.          Current Outpatient Medications:     acetaminophen (TYLENOL) 325 MG tablet, Take 2 tablets by mouth Every 6 (Six) Hours As Needed for Mild Pain., Disp: , Rfl:     amitriptyline (ELAVIL) 10 MG tablet, Take 1 tablet by mouth Every Night., Disp: , Rfl:     amitriptyline (ELAVIL) 25  MG tablet, , Disp: , Rfl:     ascorbic acid (VITAMIN C) 100 MG tablet, Take 10 tablets by mouth Daily., Disp: , Rfl:     brompheniramine-pseudoephedrine-DM (Bromfed DM) 30-2-10 MG/5ML syrup, Take 5 mL by mouth 4 (Four) Times a Day As Needed for Allergies or Cough., Disp: 473 mL, Rfl: 0    CBD (cannabidiol) oral oil, Take 1 mL by mouth Daily., Disp: , Rfl:     Cholecalciferol 50 MCG (2000 UT) capsule, Take 1 capsule by mouth., Disp: , Rfl:     fluticasone (FLONASE) 50 MCG/ACT nasal spray, 2 sprays into the nostril(s) as directed by provider Daily., Disp: 18 mL, Rfl: 3    levocetirizine (XYZAL) 5 MG tablet, Take 1 tablet by mouth Every Evening., Disp: , Rfl:     lisinopril (PRINIVIL,ZESTRIL) 10 MG tablet, Take 1 tablet by mouth Daily., Disp: , Rfl:     LUTEIN PO, Take  by mouth., Disp: , Rfl:     Multiple Vitamins-Minerals (MULTIVITAMIN ADULT PO), Take  by mouth., Disp: , Rfl:     multivitamin (MULTI VITAMIN DAILY PO), Take 1 tablet by mouth Daily., Disp: , Rfl:     pantoprazole (PROTONIX) 40 MG EC tablet, Take 1 tablet by mouth 2 (Two) Times a Day., Disp: , Rfl:     vitamin C (ASCORBIC ACID) 500 MG tablet, Take 2 tablets by mouth Daily., Disp: , Rfl:     gabapentin (NEURONTIN) 100 MG capsule, Take 3 capsules by mouth Every Night., Disp: 30 capsule, Rfl: 3    Past Medical History:   Diagnosis Date    Allergic rhinitis     Baker's cyst of knee, left     Bronchitis     Depression     Hearing loss     LEFT EAR NO AIDS    Influenza     Macular hole     HX OF    Osteoarthritis     PNA (pneumonia)     Seasonal allergies     Wears glasses      Past Surgical History:   Procedure Laterality Date    CATARACT EXTRACTION Left 2010    COLONOSCOPY  2008    clear    DENTAL PROCEDURE      extraction    DENTAL PROCEDURE      IMPLANT    KNEE JOINT MANIPULATION Left 8/11/2020    Procedure: KNEE MANIPULATION LEFT;  Surgeon: Hadley Gibson MD;  Location: [facility];  Service: Orthopedics;  Laterality: Left;    REPLACEMENT TOTAL KNEE       "TOTAL KNEE ARTHROPLASTY Left 5/21/2020    Procedure: TOTAL KNEE ARTHROPLASTY LEFT;  Surgeon: Hadley Gibson MD;  Location: [facility];  Service: Orthopedics;  Laterality: Left;    VITRECTOMY PARS PLANA W/ REPAIR OF MACULAR HOLE  2009     Social History     Socioeconomic History    Marital status:     Number of children: 2   Tobacco Use    Smoking status: Never     Passive exposure: Never    Smokeless tobacco: Never   Vaping Use    Vaping status: Never Used   Substance and Sexual Activity    Alcohol use: Never    Drug use: Never    Sexual activity: Defer     Family History   Problem Relation Age of Onset    Osteoarthritis Mother     Pneumonia Mother     Dementia Father      Blood pressure 132/70, pulse 74, temperature 98.2 °F (36.8 °C), resp. rate 16, height 167.6 cm (65.98\"), weight 80.5 kg (177 lb 9 oz), SpO2 97%, not currently breastfeeding.    Physical Exam  Constitutional:       General: She is not in acute distress.  HENT:      Head: Normocephalic and atraumatic.      Nose: Congestion present.      Mouth/Throat:      Mouth: Mucous membranes are moist.      Pharynx: Oropharynx is clear.   Eyes:      Conjunctiva/sclera: Conjunctivae normal.   Cardiovascular:      Rate and Rhythm: Normal rate and regular rhythm.      Heart sounds: No murmur heard.  Pulmonary:      Effort: Pulmonary effort is normal.      Breath sounds: No wheezing or rhonchi.   Lymphadenopathy:      Cervical: No cervical adenopathy.   Neurological:      Mental Status: She is alert and oriented to person, place, and time.           PFTs:    September 3, 2021, FVC 3.80 L, 122%, FEV1 2.96 L, 126% ratio 78%, total lung capacity 5.44 L, 105%, diffusion capacity 22.6, 108%, normal study     May 11, 2020 FVC 3.70 L, 116%, FEV1 2.93 L, 122%, ratio 79%, total lung capacity 5.32 L, 102%, diffusion capacity 20.5, 94%, normal study.        Radiology:    COMPARISON:  None.    FINDINGS:  Mediastinum and Pleura: Mild coronary artery calcification. No " mediastinal or hilar adenopathy. No pleural or pericardial effusions.    Lungs: No airspace opacities or suspicious pulmonary nodules.    Upper Abdomen: Several tiny low-density liver lesions, likely cysts, but not characterizable on this exam.    Musculoskeletal: No suspicious lytic or sclerotic lesion.  Exam End: 03/13/24 16:18       Lab:    Diagnoses and all orders for this visit:    1. Chronic cough (Primary)  -     gabapentin (NEURONTIN) 100 MG capsule; Take 3 capsules by mouth Every Night.  Dispense: 30 capsule; Refill: 3    2. Seasonal allergic rhinitis due to pollen    3. GERD without esophagitis    4. Post-COVID chronic cough        Discussion:     78-year-old woman, non-smoker evaluated in 2021 for chronic cough.  This was multifactorial and secondary to GERD, allergic rhinitis.  Cough improved with intensification of her reflux regimen and reinitiation of immunotherapy.  She did have a COVID-19 infection in January 2024 and had a persistent cough post infection. CT is clear. Her cough did not improve with restarting PPI.     Continue antihistamines and PPI  Trial of gabapentin 300mg HS  3mos with me or JANET Matthews MD

## 2024-04-30 ENCOUNTER — OFFICE VISIT (OUTPATIENT)
Dept: PULMONOLOGY | Facility: CLINIC | Age: 79
End: 2024-04-30
Payer: MEDICARE

## 2024-04-30 VITALS
TEMPERATURE: 98.2 F | BODY MASS INDEX: 28.54 KG/M2 | SYSTOLIC BLOOD PRESSURE: 132 MMHG | HEIGHT: 66 IN | RESPIRATION RATE: 16 BRPM | WEIGHT: 177.56 LBS | DIASTOLIC BLOOD PRESSURE: 70 MMHG | OXYGEN SATURATION: 97 % | HEART RATE: 74 BPM

## 2024-04-30 DIAGNOSIS — R05.3 POST-COVID CHRONIC COUGH: ICD-10-CM

## 2024-04-30 DIAGNOSIS — U09.9 POST-COVID CHRONIC COUGH: ICD-10-CM

## 2024-04-30 DIAGNOSIS — K21.9 GERD WITHOUT ESOPHAGITIS: ICD-10-CM

## 2024-04-30 DIAGNOSIS — R05.3 CHRONIC COUGH: Primary | ICD-10-CM

## 2024-04-30 DIAGNOSIS — J30.1 SEASONAL ALLERGIC RHINITIS DUE TO POLLEN: ICD-10-CM

## 2024-04-30 PROCEDURE — 99213 OFFICE O/P EST LOW 20 MIN: CPT | Performed by: INTERNAL MEDICINE

## 2024-04-30 RX ORDER — AMITRIPTYLINE HYDROCHLORIDE 10 MG/1
10 TABLET, FILM COATED ORAL NIGHTLY
COMMUNITY

## 2024-04-30 RX ORDER — GABAPENTIN 100 MG/1
300 CAPSULE ORAL NIGHTLY
Qty: 30 CAPSULE | Refills: 3 | Status: SHIPPED | OUTPATIENT
Start: 2024-04-30

## 2024-04-30 NOTE — LETTER
April 30, 2024       No Recipients    Patient: Asiya Cleaning   YOB: 1945   Date of Visit: 4/30/2024     Dear Dr. Duggan Recipients:    Thank you for referring Asiya Cleaning to me for evaluation. Below are the relevant portions of my assessment and plan of care.    If you have questions, please do not hesitate to call me. I look forward to following Asiya along with you.         Sincerely,        Steff Matthews MD        CC:   No Recipients      Progress Notes:  Asiya Cleaning is a 78 y.o. female here for evaluation of No chief complaint on file.      Problem list:  Persistent cough  Seasonal allergies  GERD  Influenza a, January 18, 2020  Osteoarthritis left knee, right foot  Mild esophagopharyngeal dysphagia, modified barium swallow, 2020  Baker's cyst left knee  Left cataract extraction, 2010  Repair of macular hole, 2009  Colonoscopy, 2008, normal  Left total knee replacement May 21st, 2020  Dental extraction  Childbirth x2  Allergies, doxycycline-rash, penicillin-itching  No tobacco, no alcohol    History of Present Illness    78-year-old woman, non-smoker initially evaluated by me in May 2020 for chronic cough.  Pulmonary function test were normal.  She had evidence of postnasal drip and was restarted on her immunotherapy.  She also had some esophageal retention with retrograde flow on her swallow assessment.  EGD was done September 2021 with no esophagitis.  Her PPI was increased to twice daily and amitriptyline was ordered at night.  She was evaluated by Annalise March 21, 2024 after she had COVID-19 in January and was coughing persistently.  CT scan done for coronary calcification March 13 revealed no pulmonary infiltrates and only mild coronary artery calcification.    Review of Systems      Current Outpatient Medications:   •  acetaminophen (TYLENOL) 325 MG tablet, Take 2 tablets by mouth Every 6 (Six) Hours As Needed for Mild Pain., Disp: , Rfl:   •  amitriptyline (ELAVIL)  25 MG tablet, , Disp: , Rfl:   •  ascorbic acid (VITAMIN C) 100 MG tablet, Take 10 tablets by mouth Daily., Disp: , Rfl:   •  brompheniramine-pseudoephedrine-DM (Bromfed DM) 30-2-10 MG/5ML syrup, Take 5 mL by mouth 4 (Four) Times a Day As Needed for Allergies or Cough., Disp: 473 mL, Rfl: 0  •  CBD (cannabidiol) oral oil, Take 1 mL by mouth Daily., Disp: , Rfl:   •  Cholecalciferol 50 MCG (2000 UT) capsule, Take 1 capsule by mouth., Disp: , Rfl:   •  fluticasone (FLONASE) 50 MCG/ACT nasal spray, 2 sprays into the nostril(s) as directed by provider Daily., Disp: 18 mL, Rfl: 3  •  levocetirizine (XYZAL) 5 MG tablet, Take 1 tablet by mouth Every Evening., Disp: , Rfl:   •  lisinopril (PRINIVIL,ZESTRIL) 10 MG tablet, Take 1 tablet by mouth Daily., Disp: , Rfl:   •  LUTEIN PO, Take  by mouth., Disp: , Rfl:   •  Multiple Vitamins-Minerals (MULTIVITAMIN ADULT PO), Take  by mouth., Disp: , Rfl:   •  multivitamin (MULTI VITAMIN DAILY PO), Take 1 tablet by mouth Daily., Disp: , Rfl:   •  pantoprazole (PROTONIX) 40 MG EC tablet, Take 1 tablet by mouth 2 (Two) Times a Day., Disp: , Rfl:   •  vitamin C (ASCORBIC ACID) 500 MG tablet, Take 2 tablets by mouth Daily., Disp: , Rfl:     Past Medical History:   Diagnosis Date   • Allergic rhinitis    • Baker's cyst of knee, left    • Bronchitis    • Depression    • Hearing loss     LEFT EAR NO AIDS   • Influenza    • Macular hole     HX OF   • Osteoarthritis    • PNA (pneumonia)    • Seasonal allergies    • Wears glasses      Past Surgical History:   Procedure Laterality Date   • CATARACT EXTRACTION Left 2010   • COLONOSCOPY  2008    clear   • DENTAL PROCEDURE      extraction   • DENTAL PROCEDURE      IMPLANT   • KNEE JOINT MANIPULATION Left 8/11/2020    Procedure: KNEE MANIPULATION LEFT;  Surgeon: Hadley Gibson MD;  Location: [facility];  Service: Orthopedics;  Laterality: Left;   • REPLACEMENT TOTAL KNEE     • TOTAL KNEE ARTHROPLASTY Left 5/21/2020    Procedure: TOTAL KNEE  ARTHROPLASTY LEFT;  Surgeon: Hadley Gibson MD;  Location: [facility];  Service: Orthopedics;  Laterality: Left;   • VITRECTOMY PARS PLANA W/ REPAIR OF MACULAR HOLE  2009     Social History     Socioeconomic History   • Marital status:    • Number of children: 2   Tobacco Use   • Smoking status: Never   • Smokeless tobacco: Never   Vaping Use   • Vaping status: Never Used   Substance and Sexual Activity   • Alcohol use: Never   • Drug use: Never   • Sexual activity: Defer     Family History   Problem Relation Age of Onset   • Osteoarthritis Mother    • Pneumonia Mother    • Dementia Father      not currently breastfeeding.    Physical Exam      PFTs:    September 3, 2021, FVC 3.80 L, 122%, FEV1 2.96 L, 126% ratio 78%, total lung capacity 5.44 L, 105%, diffusion capacity 22.6, 108%, normal study     May 11, 2020 FVC 3.70 L, 116%, FEV1 2.93 L, 122%, ratio 79%, total lung capacity 5.32 L, 102%, diffusion capacity 20.5, 94%, normal study.        Radiology:    COMPARISON:  None.    FINDINGS:  Mediastinum and Pleura: Mild coronary artery calcification. No mediastinal or hilar adenopathy. No pleural or pericardial effusions.    Lungs: No airspace opacities or suspicious pulmonary nodules.    Upper Abdomen: Several tiny low-density liver lesions, likely cysts, but not characterizable on this exam.    Musculoskeletal: No suspicious lytic or sclerotic lesion.  Exam End: 03/13/24 16:18       Lab:    Diagnoses and all orders for this visit:    1. Chronic cough (Primary)    2. Seasonal allergic rhinitis due to pollen    3. GERD without esophagitis    4. Post-COVID chronic cough        Discussion:     78-year-old woman, non-smoker evaluated in 2021 for chronic cough.  This was multifactorial and secondary to GERD, allergic rhinitis.  Cough improved with intensification of her reflux regimen and reinitiation of immunotherapy.  She did not had a COVID-19 infection in January 2024 and had a persistent cough post  infection.      Steff Matthews MD

## 2024-05-02 ENCOUNTER — TELEPHONE (OUTPATIENT)
Dept: PULMONOLOGY | Facility: CLINIC | Age: 79
End: 2024-05-02
Payer: MEDICARE

## 2024-05-02 NOTE — TELEPHONE ENCOUNTER
Patient called yesterday and stated that she took her first gabapentin the night before and the next morning she was dizzy till about noon.  She took it again last night and had no symptoms this morning.  She was considering decreasing her dose but now that she has had 1 good night she is going to stay on the 300 mg.  We agreed that if she had additional morning dizziness that she could decrease her dose to 200 mg.  She has a follow-up scheduled in June.  She currently has [facility] and they have allowed her to see us for continuity of care.  She is to get a follow-up visit preapproved.

## 2024-08-05 ENCOUNTER — OFFICE VISIT (OUTPATIENT)
Dept: PULMONOLOGY | Facility: CLINIC | Age: 79
End: 2024-08-05
Payer: MEDICARE

## 2024-08-05 VITALS
DIASTOLIC BLOOD PRESSURE: 82 MMHG | SYSTOLIC BLOOD PRESSURE: 120 MMHG | HEART RATE: 78 BPM | WEIGHT: 178 LBS | TEMPERATURE: 98.1 F | BODY MASS INDEX: 28.61 KG/M2 | OXYGEN SATURATION: 97 % | HEIGHT: 66 IN

## 2024-08-05 DIAGNOSIS — K21.9 GERD WITHOUT ESOPHAGITIS: ICD-10-CM

## 2024-08-05 DIAGNOSIS — J30.1 SEASONAL ALLERGIC RHINITIS DUE TO POLLEN: ICD-10-CM

## 2024-08-05 DIAGNOSIS — R05.3 CHRONIC COUGH: Primary | ICD-10-CM

## 2024-08-05 PROCEDURE — 99213 OFFICE O/P EST LOW 20 MIN: CPT | Performed by: INTERNAL MEDICINE

## 2024-08-05 RX ORDER — LOSARTAN POTASSIUM 25 MG/1
TABLET ORAL
COMMUNITY
Start: 2024-05-13

## 2024-08-05 NOTE — LETTER
August 5, 2024       No Recipients    Patient: Asiya Cleaning   YOB: 1945   Date of Visit: 8/5/2024     Dear Dr. Duggan Recipients:    Thank you for referring Asiya Cleaning to me for evaluation. Below are the relevant portions of my assessment and plan of care.    If you have questions, please do not hesitate to call me. I look forward to following Asiya along with you.         Sincerely,        Steff Matthews MD        CC:   No Recipients      Progress Notes:  Asiya Cleaning is a 79 y.o. female here for evaluation of   Chief Complaint   Patient presents with   • Cough   • Follow-up       Problem list:  Persistent cough  Seasonal allergies  GERD  Influenza a, January 18, 2020  Osteoarthritis left knee, right foot  Mild esophagopharyngeal dysphagia, modified barium swallow, 2020 8. EGD done, do not have report  Sung's cyst left knee  Left cataract extraction, 2010  Repair of macular hole, 2009  Colonoscopy, 2008, normal  Left total knee replacement May 21st, 2020  Dental extraction  Childbirth x2  Allergies, doxycycline-rash, penicillin-itching  No tobacco, no alcohol    History of Present Illness    79-year-old woman, non-smoker initially evaluated by me in May 2020 for chronic cough.  Pulmonary function test were normal.  She had evidence of postnasal drip and was restarted on her immunotherapy.  She also had some esophageal retention with retrograde flow on her swallow assessment.  EGD was done September 2021 with no esophagitis.  Her PPI was increased to twice daily and amitriptyline was ordered at night. Cough had improved. She did stop PPI when she became asymptomatic.   She was evaluated by Annalise March 21, 2024 after she had COVID-19 in January and was coughing persistently. For some reason[facility] not give her Paxlovid.   She did get steroids in Jan. CT scan done for coronary calcification March 13 revealed no pulmonary infiltrates and only mild  coronary artery calcification. Repeat CT chest was clear. She does have allergic rhinitis with drainage, on antihistamines..   She restarted protonix QD dosing. She never has heart burn for symptoms, just cough. She did try gabapentin for 30 days and it did not help.   Now her cough is more like clearing her throat.     Review of Systems   HENT:  Positive for congestion and postnasal drip.    Respiratory:  Positive for cough.    Gastrointestinal:         Reflux         Current Outpatient Medications:   •  acetaminophen (TYLENOL) 325 MG tablet, Take 2 tablets by mouth Every 6 (Six) Hours As Needed for Mild Pain., Disp: , Rfl:   •  amitriptyline (ELAVIL) 10 MG tablet, Take 1 tablet by mouth Every Night., Disp: , Rfl:   •  ascorbic acid (VITAMIN C) 100 MG tablet, Take 10 tablets by mouth Daily., Disp: , Rfl:   •  brompheniramine-pseudoephedrine-DM (Bromfed DM) 30-2-10 MG/5ML syrup, Take 5 mL by mouth 4 (Four) Times a Day As Needed for Allergies or Cough., Disp: 473 mL, Rfl: 0  •  CBD (cannabidiol) oral oil, Take 1 mL by mouth Daily., Disp: , Rfl:   •  Cholecalciferol 50 MCG (2000 UT) capsule, Take 1 capsule by mouth., Disp: , Rfl:   •  fluticasone (FLONASE) 50 MCG/ACT nasal spray, 2 sprays into the nostril(s) as directed by provider Daily., Disp: 18 mL, Rfl: 3  •  levocetirizine (XYZAL) 5 MG tablet, Take 1 tablet by mouth Every Evening., Disp: , Rfl:   •  losartan (COZAAR) 25 MG tablet, , Disp: , Rfl:   •  LUTEIN PO, Take  by mouth., Disp: , Rfl:   •  Multiple Vitamins-Minerals (MULTIVITAMIN ADULT PO), Take  by mouth., Disp: , Rfl:   •  pantoprazole (PROTONIX) 40 MG EC tablet, Take 1 tablet by mouth 2 (Two) Times a Day., Disp: , Rfl:   •  vitamin C (ASCORBIC ACID) 500 MG tablet, Take 2 tablets by mouth Daily., Disp: , Rfl:   •  amitriptyline (ELAVIL) 25 MG tablet, , Disp: , Rfl:     Past Medical History:   Diagnosis Date   • Allergic rhinitis    • Baker's cyst of knee, left    • Bronchitis    • Depression    • Hearing  "loss     LEFT EAR NO AIDS   • Influenza    • Macular hole     HX OF   • Osteoarthritis    • PNA (pneumonia)    • Seasonal allergies    • Wears glasses      Past Surgical History:   Procedure Laterality Date   • CATARACT EXTRACTION Left 2010   • COLONOSCOPY  2008    clear   • DENTAL PROCEDURE      extraction   • DENTAL PROCEDURE      IMPLANT   • KNEE JOINT MANIPULATION Left 8/11/2020    Procedure: KNEE MANIPULATION LEFT;  Surgeon: Hadley Gibson MD;  Location:  [facility];  Service: Orthopedics;  Laterality: Left;   • REPLACEMENT TOTAL KNEE     • TOTAL KNEE ARTHROPLASTY Left 5/21/2020    Procedure: TOTAL KNEE ARTHROPLASTY LEFT;  Surgeon: Hadley Gibsno MD;  Location:  [facility];  Service: Orthopedics;  Laterality: Left;   • VITRECTOMY PARS PLANA W/ REPAIR OF MACULAR HOLE  2009     Social History     Socioeconomic History   • Marital status:    • Number of children: 2   Tobacco Use   • Smoking status: Never     Passive exposure: Never   • Smokeless tobacco: Never   Vaping Use   • Vaping status: Never Used   Substance and Sexual Activity   • Alcohol use: Never   • Drug use: Never   • Sexual activity: Defer     Family History   Problem Relation Age of Onset   • Osteoarthritis Mother    • Pneumonia Mother    • Dementia Father      Blood pressure 120/82, pulse 78, temperature 98.1 °F (36.7 °C), height 167.6 cm (65.98\"), weight 80.7 kg (178 lb), SpO2 97%, not currently breastfeeding.    Physical Exam  Constitutional:       General: She is not in acute distress.  HENT:      Head: Normocephalic and atraumatic.      Nose: No congestion.      Mouth/Throat:      Mouth: Mucous membranes are dry.      Comments: Clear PND  Cardiovascular:      Rate and Rhythm: Normal rate and regular rhythm.      Heart sounds: No murmur heard.  Pulmonary:      Breath sounds: No wheezing or rhonchi.   Musculoskeletal:      Right lower leg: No edema.      Left lower leg: No edema.           PFTs:    September 3, 2021, FVC 3.80 L, 122%, FEV1 " 2.96 L, 126% ratio 78%, total lung capacity 5.44 L, 105%, diffusion capacity 22.6, 108%, normal study     May 11, 2020 FVC 3.70 L, 116%, FEV1 2.93 L, 122%, ratio 79%, total lung capacity 5.32 L, 102%, diffusion capacity 20.5, 94%, normal study.        Radiology:    COMPARISON:  None.    FINDINGS:  Mediastinum and Pleura: Mild coronary artery calcification. No mediastinal or hilar adenopathy. No pleural or pericardial effusions.    Lungs: No airspace opacities or suspicious pulmonary nodules.    Upper Abdomen: Several tiny low-density liver lesions, likely cysts, but not characterizable on this exam.    Musculoskeletal: No suspicious lytic or sclerotic lesion.  Exam End: 03/13/24 16:18       Lab:    Diagnoses and all orders for this visit:    1. Chronic cough (Primary)    2. Seasonal allergic rhinitis due to pollen    3. GERD without esophagitis          Discussion:     79-year-old woman, non-smoker evaluated in 2021 for chronic cough.  This was multifactorial and secondary to GERD, allergic rhinitis.  Cough improved with intensification of her reflux regimen and reinitiation of immunotherapy for allergies.  She did have a COVID-19 infection in January 2024 and had a persistent cough post infection. CT is clear. Her cough did not improve with restarting PPI.     Continue antihistamines and PPI  Try pseudoephefrine in Am only or atrovent nasal spray  6mos        Steff Matthews MD

## 2024-08-05 NOTE — PROGRESS NOTES
Asiya Cleaning is a 79 y.o. female here for evaluation of   Chief Complaint   Patient presents with    Cough    Follow-up       Problem list:  Persistent cough  Seasonal allergies  GERD  Influenza a, January 18, 2020  Osteoarthritis left knee, right foot  Mild esophagopharyngeal dysphagia, modified barium swallow, 2020 8. EGD done, do not have report  Sung's cyst left knee  Left cataract extraction, 2010  Repair of macular hole, 2009  Colonoscopy, 2008, normal  Left total knee replacement May 21st, 2020  Dental extraction  Childbirth x2  Allergies, doxycycline-rash, penicillin-itching  No tobacco, no alcohol    History of Present Illness    79-year-old woman, non-smoker initially evaluated by me in May 2020 for chronic cough.  Pulmonary function test were normal.  She had evidence of postnasal drip and was restarted on her immunotherapy.  She also had some esophageal retention with retrograde flow on her swallow assessment.  EGD was done September 2021 with no esophagitis.  Her PPI was increased to twice daily and amitriptyline was ordered at night. Cough had improved. She did stop PPI when she became asymptomatic.   She was evaluated by Annalise March 21, 2024 after she had COVID-19 in January and was coughing persistently. For some reason[facility] did not give her Paxlovid.   She did get steroids in Jan. CT scan done for coronary calcification March 13 revealed no pulmonary infiltrates and only mild coronary artery calcification. Repeat CT chest was clear. She does have allergic rhinitis with drainage, on antihistamines..   She restarted protonix QD dosing. She never has heart burn for symptoms, just cough. She did try gabapentin for 30 days and it did not help.   Now her cough is more like clearing her throat.     Review of Systems   HENT:  Positive for congestion and postnasal drip.    Respiratory:  Positive for cough.    Gastrointestinal:         Reflux         Current Outpatient  Medications:     acetaminophen (TYLENOL) 325 MG tablet, Take 2 tablets by mouth Every 6 (Six) Hours As Needed for Mild Pain., Disp: , Rfl:     amitriptyline (ELAVIL) 10 MG tablet, Take 1 tablet by mouth Every Night., Disp: , Rfl:     ascorbic acid (VITAMIN C) 100 MG tablet, Take 10 tablets by mouth Daily., Disp: , Rfl:     brompheniramine-pseudoephedrine-DM (Bromfed DM) 30-2-10 MG/5ML syrup, Take 5 mL by mouth 4 (Four) Times a Day As Needed for Allergies or Cough., Disp: 473 mL, Rfl: 0    CBD (cannabidiol) oral oil, Take 1 mL by mouth Daily., Disp: , Rfl:     Cholecalciferol 50 MCG (2000 UT) capsule, Take 1 capsule by mouth., Disp: , Rfl:     fluticasone (FLONASE) 50 MCG/ACT nasal spray, 2 sprays into the nostril(s) as directed by provider Daily., Disp: 18 mL, Rfl: 3    levocetirizine (XYZAL) 5 MG tablet, Take 1 tablet by mouth Every Evening., Disp: , Rfl:     losartan (COZAAR) 25 MG tablet, , Disp: , Rfl:     LUTEIN PO, Take  by mouth., Disp: , Rfl:     Multiple Vitamins-Minerals (MULTIVITAMIN ADULT PO), Take  by mouth., Disp: , Rfl:     pantoprazole (PROTONIX) 40 MG EC tablet, Take 1 tablet by mouth 2 (Two) Times a Day., Disp: , Rfl:     vitamin C (ASCORBIC ACID) 500 MG tablet, Take 2 tablets by mouth Daily., Disp: , Rfl:     amitriptyline (ELAVIL) 25 MG tablet, , Disp: , Rfl:     Past Medical History:   Diagnosis Date    Allergic rhinitis     Baker's cyst of knee, left     Bronchitis     Depression     Hearing loss     LEFT EAR NO AIDS    Influenza     Macular hole     HX OF    Osteoarthritis     PNA (pneumonia)     Seasonal allergies     Wears glasses      Past Surgical History:   Procedure Laterality Date    CATARACT EXTRACTION Left 2010    COLONOSCOPY  2008    clear    DENTAL PROCEDURE      extraction    DENTAL PROCEDURE      IMPLANT    KNEE JOINT MANIPULATION Left 8/11/2020    Procedure: KNEE MANIPULATION LEFT;  Surgeon: Hadley Gibson MD;  Location: [facility];  Service: Orthopedics;  Laterality: Left;     "REPLACEMENT TOTAL KNEE      TOTAL KNEE ARTHROPLASTY Left 5/21/2020    Procedure: TOTAL KNEE ARTHROPLASTY LEFT;  Surgeon: Hdaley Gibson MD;  Location: [facility];  Service: Orthopedics;  Laterality: Left;    VITRECTOMY PARS PLANA W/ REPAIR OF MACULAR HOLE  2009     Social History     Socioeconomic History    Marital status:     Number of children: 2   Tobacco Use    Smoking status: Never     Passive exposure: Never    Smokeless tobacco: Never   Vaping Use    Vaping status: Never Used   Substance and Sexual Activity    Alcohol use: Never    Drug use: Never    Sexual activity: Defer     Family History   Problem Relation Age of Onset    Osteoarthritis Mother     Pneumonia Mother     Dementia Father      Blood pressure 120/82, pulse 78, temperature 98.1 °F (36.7 °C), height 167.6 cm (65.98\"), weight 80.7 kg (178 lb), SpO2 97%, not currently breastfeeding.    Physical Exam  Constitutional:       General: She is not in acute distress.  HENT:      Head: Normocephalic and atraumatic.      Nose: No congestion.      Mouth/Throat:      Mouth: Mucous membranes are dry.      Comments: Clear PND  Cardiovascular:      Rate and Rhythm: Normal rate and regular rhythm.      Heart sounds: No murmur heard.  Pulmonary:      Breath sounds: No wheezing or rhonchi.   Musculoskeletal:      Right lower leg: No edema.      Left lower leg: No edema.           PFTs:    September 3, 2021, FVC 3.80 L, 122%, FEV1 2.96 L, 126% ratio 78%, total lung capacity 5.44 L, 105%, diffusion capacity 22.6, 108%, normal study     May 11, 2020 FVC 3.70 L, 116%, FEV1 2.93 L, 122%, ratio 79%, total lung capacity 5.32 L, 102%, diffusion capacity 20.5, 94%, normal study.        Radiology:    COMPARISON:  None.    FINDINGS:  Mediastinum and Pleura: Mild coronary artery calcification. No mediastinal or hilar adenopathy. No pleural or pericardial effusions.    Lungs: No airspace opacities or suspicious pulmonary nodules.    Upper Abdomen: Several tiny " low-density liver lesions, likely cysts, but not characterizable on this exam.    Musculoskeletal: No suspicious lytic or sclerotic lesion.  Exam End: 03/13/24 16:18       Lab:    Diagnoses and all orders for this visit:    1. Chronic cough (Primary)    2. Seasonal allergic rhinitis due to pollen    3. GERD without esophagitis          Discussion:     79-year-old woman, non-smoker evaluated in 2021 for chronic cough.  This was multifactorial and secondary to GERD, allergic rhinitis.  Cough improved with intensification of her reflux regimen and reinitiation of immunotherapy for allergies.  She did have a COVID-19 infection in January 2024 and had a persistent cough post infection. CT is clear. Her cough did not improve with restarting PPI.     Continue antihistamines and PPI  Try pseudoephefrine in Am only or atrovent nasal spray  6mos follow up        Steff Matthews MD

## 2024-10-07 NOTE — TELEPHONE ENCOUNTER
Labs drawn from [facility] line. Brought pt to subwaiting and gave samples to lab to run.    Patient notified of negative results.  Patient may call back with any questions or concerns.

## (undated) DEVICE — PUMP PAIN AUTOFUSER AUTO SELCT NOBOLUS 1TO14ML/HR 550ML DISP

## (undated) DEVICE — SYR LUERLOK 20CC BX/50

## (undated) DEVICE — GLV SURG SENSICARE W/ALOE PF LF 9 STRL

## (undated) DEVICE — NDL HYPO ECLPS SFTY 18G 1 1/2IN

## (undated) DEVICE — CVR HNDL LT SURG ACCSSRY BLU STRL

## (undated) DEVICE — PAD UNDERCAST WYTEX 6IN 4YD LF STRL

## (undated) DEVICE — ANTIBACTERIAL UNDYED BRAIDED (POLYGLACTIN 910), SYNTHETIC ABSORBABLE SUTURE: Brand: COATED VICRYL

## (undated) DEVICE — PULLOVER TOGA, 2X LARGE: Brand: FLYTE, SURGICOOL

## (undated) DEVICE — SYS SKIN CLS DERMABOND PRINEO W/22CM MESH TP

## (undated) DEVICE — STRYKER PERFORMANCE SERIES SAGITTAL BLADE: Brand: STRYKER PERFORMANCE SERIES

## (undated) DEVICE — CEMENT MIXING SYSTEM WITH MIS FEMORAL BREAKAWAY NOZZLE: Brand: REVOLUTION

## (undated) DEVICE — PULLOVER TOGA, X-LARGE: Brand: FLYTE, SURGICOOL

## (undated) DEVICE — GLV SURG PREMIERPRO MIC LTX PF SZ8.5 BRN

## (undated) DEVICE — PK KN TOTL 10

## (undated) DEVICE — DRSNG PAD ABD 8X10IN STRL

## (undated) DEVICE — Device

## (undated) DEVICE — BNDG ELAS W/CLIP 6IN 10YD LF STRL